# Patient Record
Sex: FEMALE | Race: ASIAN | NOT HISPANIC OR LATINO | Employment: UNEMPLOYED | URBAN - METROPOLITAN AREA
[De-identification: names, ages, dates, MRNs, and addresses within clinical notes are randomized per-mention and may not be internally consistent; named-entity substitution may affect disease eponyms.]

---

## 2017-01-03 RX ORDER — LEVOTHYROXINE SODIUM 100 UG/1
100 TABLET ORAL DAILY
Qty: 90 TABLET | Refills: 3 | Status: SHIPPED | OUTPATIENT
Start: 2017-01-03 | End: 2018-03-13 | Stop reason: SDUPTHER

## 2017-01-25 ENCOUNTER — TELEPHONE (OUTPATIENT)
Dept: INTERNAL MEDICINE | Facility: CLINIC | Age: 62
End: 2017-01-25

## 2017-01-25 NOTE — TELEPHONE ENCOUNTER
Spoke to pt and scheduled appt for 2/3/17. Offered pt an appt with another MD but she declined. Pt stated that she thinks the pain is fibroid pain. Offered to provide phone number to GYN but she declined because nothing was available with Dr. Dickson.

## 2017-01-25 NOTE — TELEPHONE ENCOUNTER
----- Message from Karensharif Cortez sent at 1/25/2017  3:15 PM CST -----  Contact: Self/[phone] cell  Type: Sooner appointment than  is able to schedule    When is the first available appointment? 02/03/2017    What is the nature of the appointment?Back pain    What appointment type:UC    Comments:Patient is calling to request access to an earlier appointment. She would like to be seen in the office on Friday, 01/27/2017. Please call and advise.    Thank you!

## 2017-02-03 ENCOUNTER — TELEPHONE (OUTPATIENT)
Dept: UROGYNECOLOGY | Facility: CLINIC | Age: 62
End: 2017-02-03

## 2017-02-03 ENCOUNTER — OFFICE VISIT (OUTPATIENT)
Dept: INTERNAL MEDICINE | Facility: CLINIC | Age: 62
End: 2017-02-03
Payer: COMMERCIAL

## 2017-02-03 VITALS
DIASTOLIC BLOOD PRESSURE: 75 MMHG | SYSTOLIC BLOOD PRESSURE: 118 MMHG | BODY MASS INDEX: 26.17 KG/M2 | WEIGHT: 147.69 LBS

## 2017-02-03 DIAGNOSIS — R30.0 DYSURIA: ICD-10-CM

## 2017-02-03 DIAGNOSIS — D25.1 INTRAMURAL LEIOMYOMA OF UTERUS: ICD-10-CM

## 2017-02-03 DIAGNOSIS — K80.20 GALLSTONES: ICD-10-CM

## 2017-02-03 DIAGNOSIS — M54.50 ACUTE MIDLINE LOW BACK PAIN WITHOUT SCIATICA: ICD-10-CM

## 2017-02-03 DIAGNOSIS — N81.84 PELVIC MUSCLE WASTING: ICD-10-CM

## 2017-02-03 DIAGNOSIS — Z86.010 ENCOUNTER FOR COLONOSCOPY DUE TO HISTORY OF ADENOMATOUS COLONIC POLYPS: Primary | ICD-10-CM

## 2017-02-03 DIAGNOSIS — E03.9 ACQUIRED HYPOTHYROIDISM: ICD-10-CM

## 2017-02-03 DIAGNOSIS — N81.10 BLADDER PROLAPSE, FEMALE, ACQUIRED: ICD-10-CM

## 2017-02-03 DIAGNOSIS — K76.0 FATTY LIVER: ICD-10-CM

## 2017-02-03 DIAGNOSIS — D12.6 COLON ADENOMA: ICD-10-CM

## 2017-02-03 DIAGNOSIS — R10.2 PELVIC PAIN IN FEMALE: Primary | ICD-10-CM

## 2017-02-03 DIAGNOSIS — Z12.11 ENCOUNTER FOR COLONOSCOPY DUE TO HISTORY OF ADENOMATOUS COLONIC POLYPS: Primary | ICD-10-CM

## 2017-02-03 LAB
BILIRUB UR QL STRIP: NEGATIVE
CLARITY UR REFRACT.AUTO: ABNORMAL
COLOR UR AUTO: YELLOW
GLUCOSE UR QL STRIP: NEGATIVE
HGB UR QL STRIP: NEGATIVE
KETONES UR QL STRIP: NEGATIVE
LEUKOCYTE ESTERASE UR QL STRIP: NEGATIVE
NITRITE UR QL STRIP: NEGATIVE
PH UR STRIP: 8 [PH] (ref 5–8)
PROT UR QL STRIP: NEGATIVE
SP GR UR STRIP: 1.01 (ref 1–1.03)
URN SPEC COLLECT METH UR: ABNORMAL
UROBILINOGEN UR STRIP-ACNC: NEGATIVE EU/DL

## 2017-02-03 PROCEDURE — 99999 PR PBB SHADOW E&M-EST. PATIENT-LVL III: CPT | Mod: PBBFAC,,, | Performed by: INTERNAL MEDICINE

## 2017-02-03 PROCEDURE — 87086 URINE CULTURE/COLONY COUNT: CPT

## 2017-02-03 PROCEDURE — 81003 URINALYSIS AUTO W/O SCOPE: CPT

## 2017-02-03 PROCEDURE — 99214 OFFICE O/P EST MOD 30 MIN: CPT | Mod: S$GLB,,, | Performed by: INTERNAL MEDICINE

## 2017-02-03 RX ORDER — SODIUM, POTASSIUM,MAG SULFATES 17.5-3.13G
SOLUTION, RECONSTITUTED, ORAL ORAL
Qty: 1 BOTTLE | Refills: 0 | Status: ON HOLD | OUTPATIENT
Start: 2017-02-03 | End: 2017-03-03 | Stop reason: ALTCHOICE

## 2017-02-03 NOTE — PROGRESS NOTES
Subjective:       Patient ID: Isha Leonard is a 61 y.o. female.    Chief Complaint: Back Pain and Abdominal Pain    HPI Comments: UC appt    Low back pain severe last week, better now. She tried no meds; did try heat.    She used a skirt to support her abdomen- she is concerned about fibroids.  Belly more protuberant she says, more painful; also with bladder prolapse.  Would like to return to GYN to consider hysterectomy and bladder suspension.    No upper abdominal sx to suggest gallbladder.  No weight loss or change in bowel habits; she is due for a colonoscopy as well.  Reviewed.    Some urinary frequency.    Patient Active Problem List:     Hyperlipidemia     Acquired hypothyroidism     Vocal cord cyst     AR (allergic rhinitis)     Glucose intolerance (impaired glucose tolerance)     Lipoma of other skin and subcutaneous tissue     Pelvic muscle wasting     Intramural leiomyoma of uterus     Gallstones: see ultrasound 2014     Thyroid nodule: s/p R thyroidectomy; L side wnl 2014     Carpal tunnel syndrome of left wrist     Colon adenoma: 2011- repeat colonoscopy 12/16     Fatty liver: see u/s 2014            Back Pain   Associated symptoms include abdominal pain. Pertinent negatives include no fever, numbness or weakness.   Abdominal Pain   Associated symptoms include arthralgias and frequency. Pertinent negatives include no fever or hematuria.     Review of Systems   Constitutional: Negative for chills, fatigue and fever.   HENT: Positive for congestion and postnasal drip. Negative for ear pain.    Eyes: Negative.    Respiratory: Negative for cough, chest tightness, shortness of breath and wheezing.    Cardiovascular: Negative.    Gastrointestinal: Positive for abdominal distention and abdominal pain.   Genitourinary: Positive for frequency. Negative for difficulty urinating and hematuria.   Musculoskeletal: Positive for arthralgias and back pain.   Skin: Negative for rash.   Neurological: Negative for  weakness and numbness.       Objective:      Physical Exam   Constitutional: She is oriented to person, place, and time. She appears well-developed and well-nourished.   HENT:   Head: Normocephalic and atraumatic.   Right Ear: External ear normal.   Left Ear: External ear normal.   Nose: Nose normal.   Mouth/Throat: Oropharynx is clear and moist. No oropharyngeal exudate.   Eyes: Conjunctivae and EOM are normal. No scleral icterus.   Neck: Normal range of motion. Neck supple. No JVD present. No thyromegaly present.   Cardiovascular: Normal rate, regular rhythm, normal heart sounds and intact distal pulses.  Exam reveals no gallop.    No murmur heard.  Pulmonary/Chest: Effort normal and breath sounds normal. No respiratory distress. She has no wheezes. She exhibits no tenderness.   Abdominal: Soft. Bowel sounds are normal. She exhibits no distension and no mass. There is no tenderness. There is no rebound and no guarding.   Fibroids- enlarged uterus   Musculoskeletal: Normal range of motion. She exhibits no edema or tenderness.   No CVA pain.  Negative SLR.  Strength UE and LE wnl.  No pain over spine   Lymphadenopathy:     She has no cervical adenopathy.   Neurological: She is alert and oriented to person, place, and time. She displays normal reflexes. No cranial nerve deficit. Coordination normal.   Skin: Skin is warm and dry. No rash noted. No erythema.   Psychiatric: She has a normal mood and affect. Her behavior is normal. Judgment and thought content normal.   Nursing note and vitals reviewed.      Assessment:       1. Pelvic pain in female    2. Colon adenoma: 2011- repeat colonoscopy 12/16    3. Pelvic muscle wasting    4. Bladder prolapse, female, acquired    5. Intramural leiomyoma of uterus    6. Dysuria    7. Acquired hypothyroidism    8. Fatty liver: see u/s 2014    9. Gallstones: see ultrasound 2014    10. Acute midline low back pain without sciatica        Plan:         Pelvic pain in female  -     CT  Abdomen Pelvis W Wo Contrast; Future; Expected date: 2/3/17    Colon adenoma: 2011- repeat colonoscopy 12/16  -     Case request GI: COLONOSCOPY    Pelvic muscle wasting  -     Ambulatory consult to Urogynecology    Bladder prolapse, female, acquired  -     Ambulatory consult to Urogynecology    Intramural leiomyoma of uterus  -     Ambulatory consult to Urogynecology    Dysuria  -     Urinalysis  -     Urine culture    Acquired hypothyroidism: continue meds    Fatty liver: see u/s 2014: diet reviewed; will continue to monitor    Gallstones: see ultrasound 2014: no sx    Natural history of back pain reviewed; NSAIDs, exercise, ice; may need PT.  No alarms sx or signs    I will review all studies and determine further tx depending on findings

## 2017-02-03 NOTE — TELEPHONE ENCOUNTER
----- Message from Jose R Downey sent at 2/3/2017 11:20 AM CST -----  Contact: Pt  X_ 1st Request  _ 2nd Request  _ 3rd Request    Who:MALLORY LING [[id number]]    Why: Patient states they would like to speak with staff in regards to scheduling an appointment     What Number to Call Back: [phone]    When to Expect a call back: (Before the end of the day)  -- if call after 3:00 call back will be tomorrow.

## 2017-02-03 NOTE — Clinical Note
Bernabe Bean- hope you're ok!  Am sending her to Dr Salazar for prolapse; she seems to think her fibroids are causing a lot of issues so may need to see you again as well-  Gwen

## 2017-02-03 NOTE — MR AVS SNAPSHOT
[facility]  [address]  [facility] [address]  Phone: [phone]  Fax: [phone]                  Isha Leonard   2/3/2017 10:30 AM   Office Visit    Description:  Female : 1955   Provider:  Gwne Duarte MD   Department:  [facility]           Reason for Visit     Back Pain     Abdominal Pain           Diagnoses this Visit        Comments    Colon adenoma    -  Primary     Pelvic pain in female         Pelvic muscle wasting         Bladder prolapse, female, acquired         Intramural leiomyoma of uterus         Dysuria                To Do List           Future Appointments        Provider Department Dept Phone    2017 7:30 AM [facility] CT1 LIMIT 400 LBS [facility] [phone]      To Schedule:     Please call the Endoscopy Department at [phone] to schedule your appointment.          Goals (5 Years of Data)     None      [facility]     [facility] On [facility] Nurse Care Line -  Assistance  Registered nurses in the [facility] Center provide clinical advisement, health education, appointment booking, and other advisory services.  Call for this free service at [phone].             Medications           Message regarding Medications     Verify the changes and/or additions to your medication regime listed below are the same as discussed with your clinician today.  If any of these changes or additions are incorrect, please notify your healthcare provider.             Verify that the below list of medications is an accurate representation of the medications you are currently taking.  If none reported, the list may be blank. If incorrect, please contact your healthcare provider. Carry this list with you in case of emergency.           Current Medications     levothyroxine (SYNTHROID) 100 MCG tablet Take 1 tablet (100 mcg total) by mouth once daily.    loratadine (CLARITIN) 10 mg tablet Take 10 mg by mouth once daily.     sodium chloride (SALINE NASAL) 0.65 % nasal spray 1 spray by Nasal route as needed for Congestion.           Clinical Reference Information           Your Vitals Were     BP Weight BMI          118/75 (BP Location: Left arm, Patient Position: Sitting, BP Method: Manual) 67 kg (147 lb 11.3 oz) 26.17 kg/m2        Blood Pressure          Most Recent Value    BP  118/75      Allergies as of 2/3/2017     No Known Allergies      Immunizations Administered on Date of Encounter - 2/3/2017     None      Orders Placed During Today's Visit      Normal Orders This Visit    Ambulatory consult to Urogynecology     Case request GI: COLONOSCOPY     Urinalysis     Urine culture     Future Labs/Procedures Expected by Expires    CT Abdomen Pelvis W Wo Contrast  2/3/2017 5/4/2017      Instructions      Back Pain (Acute or Chronic)    Back pain is one of the most common problems. The good news is that most people feel better in 1 to 2 weeks, and most of the rest in 1 to 2 months. Most people can remain active.  People experience and describe pain differently; not everyone is the same.  · The pain can be sharp, stabbing, shooting, aching, cramping or burning.  · Movement, standing, bending, lifting, sitting, or walking may worsen pain.  · It can be localized to one spot or area, or it can be more generalized.  · It can spread or radiate upwards, to the front, or go down your arms or legs (sciatica).  · It can cause muscle spasm.  Most of the time, mechanical problems with the muscles or spine cause the pain. Mechanical problems are usually caused by an injury to the muscles or ligaments. While illness can cause back pain, it is usually not caused by a serious illness. Mechanical problems include:   · Physical activity such as sports, exercise, work, or normal activity  · Overexertion, lifting, pushing, pulling incorrectly or too aggressively  · Sudden twisting, bending, or stretching from an accident, or accidental movement  · Poor  posture  · Stretching or moving wrong, without noticing pain at the time  · Poor coordination, lack of regular exercise (check with your doctor about this)  · Spinal disc disease or arthritis  · Stress  Pain can also be related to pregnancy, or illness like appendicitis, bladder or kidney infections, pelvic infections, and many other things.  Acute back pain usually gets better in 1 to 2 weeks. Back pain related to disk disease, arthritis in the spinal joints or spinal stenosis (narrowing of the spinal canal) can become chronic and last for months or years.  Unless you had a physical injury (for example, a car accident or fall) X-rays are usually not needed for the initial evaluation of back pain. If pain continues and does not respond to medical treatment, X-rays and other tests may be needed.  Home care  Try these home care recommendations:  · When in bed, try to find a position of comfort. A firm mattress is best. Try lying flat on your back with pillows under your knees. You can also try lying on your side with your knees bent up towards your chest and a pillow between your knees.  · At first, do not try to stretch out the sore spots. If there is a strain, it is not like the good soreness you get after exercising without an injury. In this case, stretching may make it worse.  · Avoid prolong sitting, long car rides, or travel. This puts more stress on the lower back than standing or walking.  · During the first 24 to 72 hours after an acute injury or flare up of chronic back pain, apply an ice pack to the painful area for 20 minutes and then remove it for 20 minutes. Do this over a period of 60 to 90 minutes or several times a day. This will reduce swelling and pain. Wrap the ice pack in a thin towel or plastic to protect your skin.  · You can start with ice, then switch to heat. Heat (hot shower, hot bath, or heating pad) reduces pain and works well for muscle spasms. Heat can be applied to the painful area for  20 minutes then remove it for 20 minutes. Do this over a period of 60 to 90 minutes or several times a day. Do not sleep on a heating pad. It can lead to skin burns or tissue damage.  · You can alternate ice and heat therapy. Talk with your doctor about the best treatment for your back pain.  · Therapeutic massage can help relax the back muscles without stretching them.  · Be aware of safe lifting methods and do not lift anything without stretching first.  Medicines  Talk to your doctor before using medicine, especially if you have other medical problems or are taking other medicines.  · You may use over-the-counter medicine as directed on the bottle to control pain, unless another pain medicine was prescribed. If you have chronic conditions like diabetes, liver or kidney disease, stomach ulcers, or gastrointestinal bleeding, or are taking blood thinners, talk to your doctor before taking any medicine.  · Be careful if you are given a prescription medicines, narcotics, or medicine for muscle spasms. They can cause drowsiness, affect your coordination, reflexes, and judgement. Do not drive or operate heavy machinery.  Follow-up care  Follow up with your healthcare provider, or as advised.   A radiologist will review any X-rays that were taken. Your provide will notify you of any new findings that may affect your care.  Call 911  Call emergency services if any of the following occur:  · Trouble breathing  · Confusion  · Very drowsy or trouble awakening  · Fainting or loss of consciousness  · Rapid or very slow heart rate  · Loss of bowel or bladder control  When to seek medical advice  Call your healthcare provider right away if any of these occur:   · Pain becomes worse or spreads to your legs  · Weakness or numbness in one or both legs  · Numbness in the groin or genital area  Date Last Reviewed: 7/1/2016  © 1011-4112 [employer]. [address] [place] [address], [place] [address]. All rights reserved. This  information is not intended as a substitute for professional medical care. Always follow your healthcare professional's instructions.        Self-Care for Low Back Pain    Most people have low back pain now and then. In many cases, it isnt serious and self-care can help. Sometimes low back pain can be a sign of a bigger problem. Call your healthcare provider if your pain returns often or gets worse over time. For the long-term care of your back, get regular exercise, lose any excess weight and learn good posture.  Take a short rest  Lying down during the day may be beneficial for short periods of time if severe pain increases with sitting or standing. Long-term bed rest could be detrimental.  Reduce pain and swelling  Cold reduces swelling. Both cold and heat can reduce pain. Protect your skin by placing a towel between your body and the ice or heat source.  · For the first few days, apply an ice pack for 15 to 20 minutes .  · After the first few days, try heat for 15 minutes at a time to ease pain. Never sleep on a heating pad.  · Over-the-counter medicine can help control pain and swelling. Try aspirin or ibuprofen.  Exercise  Exercise can help your back heal. It also helps your back get stronger and more flexible, preventing any reinjury. Ask your healthcare provider about specific exercises for your back.  Use good posture to avoid reinjury  · When moving, bend at the hips and knees. Dont bend at the waist or twist around.  · When lifting, keep the object close to your body. Dont try to lift more than you can handle.  · When sitting, keep your lower back supported. Use a rolled-up towel as needed.  Seek immediate medical care if:  · Youre unable to stand or walk.  · You have a temperature over 100.4°F (38.0°C)  · You have frequent, painful, or bloody urination.  · You have severe abdominal pain.  · You have a sharp, stabbing pain.  · Your pain is constant.  · You have pain or numbness in your leg.  · You  feel pain in a new area of your back.  · You notice that the pain isnt decreasing after more than a week.   Date Last Reviewed: 9/29/2015 © 2000-2016 [employer], [address] [place] [address], [place] [address]. All rights reserved. This information is not intended as a substitute for professional medical care. Always follow your healthcare professional's instructions.             Language Assistance Services     ATTENTION: Language assistance services are available, free of charge. Please call [phone].      ATENCIÓN: Si kandila sohail, tiene a durham disposición servicios gratuitos de asistencia lingüística. Llame al [phone].     ELMIRA Ý: N?u b?n nói Ti?ng Vi?t, có các d?ch v? h? tr? ngôn ng? mi?n phí dành cho b?n. G?i s? [phone].         [facility] complies with applicable Federal civil rights laws and does not discriminate on the basis of race, color, national origin, age, disability, or sex.

## 2017-02-03 NOTE — PATIENT INSTRUCTIONS
Back Pain (Acute or Chronic)    Back pain is one of the most common problems. The good news is that most people feel better in 1 to 2 weeks, and most of the rest in 1 to 2 months. Most people can remain active.  People experience and describe pain differently; not everyone is the same.  · The pain can be sharp, stabbing, shooting, aching, cramping or burning.  · Movement, standing, bending, lifting, sitting, or walking may worsen pain.  · It can be localized to one spot or area, or it can be more generalized.  · It can spread or radiate upwards, to the front, or go down your arms or legs (sciatica).  · It can cause muscle spasm.  Most of the time, mechanical problems with the muscles or spine cause the pain. Mechanical problems are usually caused by an injury to the muscles or ligaments. While illness can cause back pain, it is usually not caused by a serious illness. Mechanical problems include:   · Physical activity such as sports, exercise, work, or normal activity  · Overexertion, lifting, pushing, pulling incorrectly or too aggressively  · Sudden twisting, bending, or stretching from an accident, or accidental movement  · Poor posture  · Stretching or moving wrong, without noticing pain at the time  · Poor coordination, lack of regular exercise (check with your doctor about this)  · Spinal disc disease or arthritis  · Stress  Pain can also be related to pregnancy, or illness like appendicitis, bladder or kidney infections, pelvic infections, and many other things.  Acute back pain usually gets better in 1 to 2 weeks. Back pain related to disk disease, arthritis in the spinal joints or spinal stenosis (narrowing of the spinal canal) can become chronic and last for months or years.  Unless you had a physical injury (for example, a car accident or fall) X-rays are usually not needed for the initial evaluation of back pain. If pain continues and does not respond to medical treatment, X-rays and other tests may be  needed.  Home care  Try these home care recommendations:  · When in bed, try to find a position of comfort. A firm mattress is best. Try lying flat on your back with pillows under your knees. You can also try lying on your side with your knees bent up towards your chest and a pillow between your knees.  · At first, do not try to stretch out the sore spots. If there is a strain, it is not like the good soreness you get after exercising without an injury. In this case, stretching may make it worse.  · Avoid prolong sitting, long car rides, or travel. This puts more stress on the lower back than standing or walking.  · During the first 24 to 72 hours after an acute injury or flare up of chronic back pain, apply an ice pack to the painful area for 20 minutes and then remove it for 20 minutes. Do this over a period of 60 to 90 minutes or several times a day. This will reduce swelling and pain. Wrap the ice pack in a thin towel or plastic to protect your skin.  · You can start with ice, then switch to heat. Heat (hot shower, hot bath, or heating pad) reduces pain and works well for muscle spasms. Heat can be applied to the painful area for 20 minutes then remove it for 20 minutes. Do this over a period of 60 to 90 minutes or several times a day. Do not sleep on a heating pad. It can lead to skin burns or tissue damage.  · You can alternate ice and heat therapy. Talk with your doctor about the best treatment for your back pain.  · Therapeutic massage can help relax the back muscles without stretching them.  · Be aware of safe lifting methods and do not lift anything without stretching first.  Medicines  Talk to your doctor before using medicine, especially if you have other medical problems or are taking other medicines.  · You may use over-the-counter medicine as directed on the bottle to control pain, unless another pain medicine was prescribed. If you have chronic conditions like diabetes, liver or kidney disease,  stomach ulcers, or gastrointestinal bleeding, or are taking blood thinners, talk to your doctor before taking any medicine.  · Be careful if you are given a prescription medicines, narcotics, or medicine for muscle spasms. They can cause drowsiness, affect your coordination, reflexes, and judgement. Do not drive or operate heavy machinery.  Follow-up care  Follow up with your healthcare provider, or as advised.   A radiologist will review any X-rays that were taken. Your provide will notify you of any new findings that may affect your care.  Call 911  Call emergency services if any of the following occur:  · Trouble breathing  · Confusion  · Very drowsy or trouble awakening  · Fainting or loss of consciousness  · Rapid or very slow heart rate  · Loss of bowel or bladder control  When to seek medical advice  Call your healthcare provider right away if any of these occur:   · Pain becomes worse or spreads to your legs  · Weakness or numbness in one or both legs  · Numbness in the groin or genital area  Date Last Reviewed: 7/1/2016 © 2000-2016 [employer]. [address] [place] [address]. All rights reserved. This information is not intended as a substitute for professional medical care. Always follow your healthcare professional's instructions.        Self-Care for Low Back Pain    Most people have low back pain now and then. In many cases, it isnt serious and self-care can help. Sometimes low back pain can be a sign of a bigger problem. Call your healthcare provider if your pain returns often or gets worse over time. For the long-term care of your back, get regular exercise, lose any excess weight and learn good posture.  Take a short rest  Lying down during the day may be beneficial for short periods of time if severe pain increases with sitting or standing. Long-term bed rest could be detrimental.  Reduce pain and swelling  Cold reduces swelling. Both cold and heat can reduce pain. Protect  your skin by placing a towel between your body and the ice or heat source.  · For the first few days, apply an ice pack for 15 to 20 minutes .  · After the first few days, try heat for 15 minutes at a time to ease pain. Never sleep on a heating pad.  · Over-the-counter medicine can help control pain and swelling. Try aspirin or ibuprofen.  Exercise  Exercise can help your back heal. It also helps your back get stronger and more flexible, preventing any reinjury. Ask your healthcare provider about specific exercises for your back.  Use good posture to avoid reinjury  · When moving, bend at the hips and knees. Dont bend at the waist or twist around.  · When lifting, keep the object close to your body. Dont try to lift more than you can handle.  · When sitting, keep your lower back supported. Use a rolled-up towel as needed.  Seek immediate medical care if:  · Youre unable to stand or walk.  · You have a temperature over 100.4°F (38.0°C)  · You have frequent, painful, or bloody urination.  · You have severe abdominal pain.  · You have a sharp, stabbing pain.  · Your pain is constant.  · You have pain or numbness in your leg.  · You feel pain in a new area of your back.  · You notice that the pain isnt decreasing after more than a week.   Date Last Reviewed: 9/29/2015  © 7120-1504 [employer]. [address] [place] [address], [place] [address]. All rights reserved. This information is not intended as a substitute for professional medical care. Always follow your healthcare professional's instructions.

## 2017-02-03 NOTE — TELEPHONE ENCOUNTER
Spoke to pt and scheduled her for Dr. Salazar's next available and informed her I'd send an appointment letter in the mail. Pt voiced understanding and call ended.

## 2017-02-04 LAB — BACTERIA UR CULT: NO GROWTH

## 2017-02-06 ENCOUNTER — PATIENT MESSAGE (OUTPATIENT)
Dept: INTERNAL MEDICINE | Facility: CLINIC | Age: 62
End: 2017-02-06

## 2017-02-06 ENCOUNTER — HOSPITAL ENCOUNTER (OUTPATIENT)
Dept: RADIOLOGY | Facility: HOSPITAL | Age: 62
Discharge: HOME | End: 2017-02-06
Attending: INTERNAL MEDICINE
Payer: COMMERCIAL

## 2017-02-06 DIAGNOSIS — R10.2 PELVIC PAIN IN FEMALE: ICD-10-CM

## 2017-02-08 ENCOUNTER — INITIAL CONSULT (OUTPATIENT)
Dept: UROGYNECOLOGY | Facility: CLINIC | Age: 62
End: 2017-02-08
Payer: COMMERCIAL

## 2017-02-08 VITALS
DIASTOLIC BLOOD PRESSURE: 66 MMHG | HEIGHT: 63 IN | WEIGHT: 147.69 LBS | SYSTOLIC BLOOD PRESSURE: 120 MMHG | BODY MASS INDEX: 26.17 KG/M2

## 2017-02-08 DIAGNOSIS — N39.3 URINARY, INCONTINENCE, STRESS FEMALE: ICD-10-CM

## 2017-02-08 DIAGNOSIS — M62.08 RECTUS DIASTASIS: ICD-10-CM

## 2017-02-08 DIAGNOSIS — N81.11 MIDLINE CYSTOCELE: ICD-10-CM

## 2017-02-08 DIAGNOSIS — R10.2 PELVIC PRESSURE IN FEMALE: Primary | ICD-10-CM

## 2017-02-08 DIAGNOSIS — N81.4 UTEROVAGINAL PROLAPSE: ICD-10-CM

## 2017-02-08 DIAGNOSIS — N81.6 RECTOCELE, FEMALE: ICD-10-CM

## 2017-02-08 PROCEDURE — 87086 URINE CULTURE/COLONY COUNT: CPT

## 2017-02-08 PROCEDURE — 51701 INSERT BLADDER CATHETER: CPT | Mod: S$GLB,,, | Performed by: OBSTETRICS & GYNECOLOGY

## 2017-02-08 PROCEDURE — 99245 OFF/OP CONSLTJ NEW/EST HI 55: CPT | Mod: 25,S$GLB,, | Performed by: OBSTETRICS & GYNECOLOGY

## 2017-02-08 PROCEDURE — 99999 PR PBB SHADOW E&M-EST. PATIENT-LVL III: CPT | Mod: PBBFAC,,, | Performed by: OBSTETRICS & GYNECOLOGY

## 2017-02-08 NOTE — LETTER
February 8, 2017      Gwen Duarte MD  [address] [facility] [id number]           [facility] [address] [place] [address]  Phone: [phone]          Patient: Isha Leonard   MR Number: [id number]   YOB: 1955   Date of Visit: 2/8/2017       Dear Dr. Gwen Duarte:    Thank you for referring Isha Leonard to me for evaluation. Attached you will find relevant portions of my assessment and plan of care.    If you have questions, please do not hesitate to call me. I look forward to following Isha Leonard along with you.    Sincerely,    Ese Salazar MD    Enclosure  CC:  No Recipients    If you would like to receive this communication electronically, please contact [email or web address] or [phone] to request more information on [employer] access.    For providers and/or their staff who would like to refer a patient to [facility], please contact us through our one-stop-shop provider referral line, [facility], at [phone].    If you feel you have received this communication in error or would no longer like to receive these types of communications, please e-mail [email or web address]

## 2017-02-08 NOTE — PATIENT INSTRUCTIONS
1)  Stage 2 cystocele, stage 1 uterovaginal prolapse, stage 2 rectocele in setting of rectus diastasis:  --very mild, not past introitus; main complaint is vaginal pressure and abdominal laxity/pressure  --start core exercises (see sheets); consider joining gym   --start PT:  Want pelvic floor PT + core strengthening:  [place] ([facility]): (p) [phone]. (f) [phone]. Established patients call:  [phone]. Call in a few days to make appointment.    --could consider surgery to reapproximate diastasis in future    2)  Female stress incontinence:  --urine C&S  --Empty bladder every 3 hours.  Empty well: wait a minute, lean forward on toilet.    --Avoid dietary irritants (see sheet).  Keep diary x 3-5 days to determine your irritants.  --KEGELS: do 10 in AM and 10 in PM, holding each x 10 seconds.  When you feel urge to go, STOP, KEGEL, and when urge has passed, then go to bathroom.  Consider PT in future.    --STRESS:  Pessary vs. Sling.     3)  Well-woman:  Last pap: 8/2016- negative  Last mammogram: 7/2016- negative  Colonoscopy: Planned for march  DEXA: 2015 Osteopenia - Non-compliant with calcium/vit D.  Repeat 2017-19.      Healthy bones:   --Take 600 mg calcium every AM and 600 mg calcium every PM (for total of 1200 mg daily).  Take 400 IU vitamin D in AM and 400 IU vitamin D in the PM (for total of 800 IU daily).    --Start weight bearing exercises in improve bone density:  This type of exercise forces you to work against gravity. Some examples of weight-bearing exercises include weight training, walking, hiking, jogging, climbing stairs, tennis, and dancing. Examples of exercises that are not weight-bearing include swimming and bicycling. Although these activities help build and maintain strong muscles and have excellent cardiovascular benefits, they are not the best way to exercise your bones.  --Follow up for repeat DEXA (bone density testing) as scheduled.     4)   RTC 2-3 months.

## 2017-02-08 NOTE — PROGRESS NOTES
[facility]  [address] [facility] [address]    Isha Leonard  [id number]  1955    Consulting Physician: Gwen Duarte MD   GYN: Geneva Dickson MD  Primary M.D.: Gwen Duarte MD    Chief Complaint   Patient presents with    Vaginal Prolapse     pt states she's been feeling a buldge for 2 years    Fibroids    Urinary Incontinence     stress       HPI:     1)  UI:  (+) HUMZA.  (--) UUI  X 10years.  (+) pads:2/day, usually minimum wetness.  Daytime frequency: Q 2 hours.  Nocturia: No: 2/night.   (+) dysuria,  (--) hematuria,  (--) frequent UTIs.  (--) complete bladder emptying. Has to lean forward.    2)  POP:  Present. above introitus.  Symptoms:(+)  pressure.  (--) vaginal bleeding. (--) vaginal discharge. (+) sexually active.  (--) dyspareunia.  (--)  Vaginal dryness.  (--) vaginal estrogen use.     3)  BM:  (--) constipation/straining.  (--) chronic diarrhea. (--) hematochezia.  (--) fecal incontinence.  (--) fecal smearing/urgency.  (--) incomplete evacuation.     Past Medical History  Past Medical History   Diagnosis Date    Anemia     AR (allergic rhinitis) 2013    Colon adenoma: - repeat colonoscopy 2016    Fatty liver     GERD (gastroesophageal reflux disease)     Glucose intolerance (impaired glucose tolerance)     Hyperlipidemia     Hypertension     Pneumonia     Thyroid disease       Past Surgical History  Past Surgical History   Procedure Laterality Date    Thyroidectomy, partial       secondary to thyroid nodule    Breast biopsy       Left, benign      Hysterectomy: No  Cervix present/absent: Yes (2016- negative)  Ovaries present/absent: Yes    Past Ob History     x 4.      Largest infant weight: 8lb 2oz  yes FAVD. unknown episiotomy.      Gynecologic History  LMP: No LMP recorded. Patient is postmenopausal.  Age of menarche: 14  Age of menopause: 54  Menstrual history: Regular  Last pap: 2016-  negative  Last mammogram: 2016- negative  Colonoscopy: Planned for march  DEXA: Osteopenia - Non-compliant with calcium/vit D    Family History  Family History   Problem Relation Age of Onset    Osteoporosis Mother     Diabetes Mother     Hypertension Mother     Heart disease Mother     Cancer Father      Bladder cancer    Diabetes Sister     Breast cancer Neg Hx     Colon cancer Neg Hx     Eclampsia Neg Hx     Miscarriages / Stillbirths Neg Hx     Ovarian cancer Neg Hx      labor Neg Hx     Stroke Neg Hx     Cervical cancer Neg Hx     Endometrial cancer Neg Hx     Vaginal cancer Neg Hx       Colon CA: No  Breast CA: No  GYN CA: No   CA: Father- bladder cancer    Social History  History   Smoking Status    Never Smoker   Smokeless Tobacco    Never Used   .  History   Alcohol Use No   .    History   Drug Use No   .  The patient is .  Resides in [place] [address].  Employment status: retired  Teacher    Allergies  Review of patient's allergies indicates:  No Known Allergies    Medications  Current Outpatient Prescriptions on File Prior to Visit   Medication Sig Dispense Refill    levothyroxine (SYNTHROID) 100 MCG tablet Take 1 tablet (100 mcg total) by mouth once daily. 90 tablet 3    loratadine (CLARITIN) 10 mg tablet Take 10 mg by mouth once daily.      sodium chloride (SALINE NASAL) 0.65 % nasal spray 1 spray by Nasal route as needed for Congestion.      sodium,potassium,mag sulfates (SUPREP BOWEL PREP KIT) 17.5-3.13-1.6 gram SolR As directed-dispense 1 kit 1 Bottle 0     No current facility-administered medications on file prior to visit.        Review of Systems A 14 point ROS was reviewed with pertinent positives as noted above in the history of present illness.      Constitutional: negative  Eyes: negative  Endocrine: negative  Gastrointestinal: negative  Cardiovascular: negative  Respiratory: negative  Allergic/Immunologic: negative  Integumentary:  "negative  Psychiatric: negative  Musculoskeletal: negative   Ear/Nose/Throat: negative  Neurologic: negative  Genitourinary: SEE HPI  Hematologic/Lymphatic: negative   Breast: negative    Urogynecologic Exam  Visit Vitals    /66    Ht 5' 3" (1.6 m)    Wt 67 kg (147 lb 11.3 oz)    BMI 26.17 kg/m2       GENERAL APPEARANCE:  The patient is a well-developed, well-nourished female.  Neck:  Supple with no thyromegaly, no carotid bruits.  Heart:  Regular rate and rhythm, no murmurs, rubs or gallops.  Lungs:  Clear.  No CVA tenderness.  Abdomen:  Soft, nontender, nondistended, no hepatosplenomegaly.  Incisions:  None.  +rectus diastasis.      PELVIC:    External genitalia:  Normal Bartholins, Skenes and labia bilaterally.    Urethra:  No caruncle, diverticulum or masses.  (--) hypermobility.    Vagina:  Atrophy (--) , no bladder masses or tender, no discharge.    Cervix:  normal appearance  Uterus: enlarged, 12 weeks size. Mobile, NT.    Adnexa: Not palpable.    POP-Q:  Aa 0; Ba 0; C -5; Ap -1; Bp -1; D -7.  Genital hiatus 3, perineal body 2 total vaginal length 11-12 (standing).      NEUROLOGIC:  Cranial nerves 2 through 12 intact.  Strength 5/5.  DTRs 2+ lower extremities.  S2 through 4 normal.  Sacral reflexes intact.    EXT: CABAN, 2+ pulses bilaterally, no C/C/E    COUGH STRESS TEST:  negative  KEGEL: 1 /5    RECTAL:    External:  Normal, (--) hemorrhoids, (--) dovetailing.   Internal:   (--) tenderness, (--) masses, Normal resting tone, Abnormal - decreased active tone.  Heme occult: negative    PVR: 30 mL    Impression    1. Pelvic pressure in female    2. Urinary, incontinence, stress female    3. Rectus diastasis    4. Midline cystocele    5. Rectocele, female    6. Uterovaginal prolapse        Initial Plan  The patient was counseled regarding these issues. She was given a summary sheet containing each of these issues with possible options for evaluation and management. We also reviewed computer-generated " diagrams specific to her pelvic organ prolapse quantification findings and she was given a copy of this for her records.  All her questions were addressed to her satisfaction.    1)  Stage 2 cystocele, stage 1 uterovaginal prolapse, stage 2 rectocele in setting of rectus diastasis:  --very mild, not past introitus; main complaint is vaginal pressure and abdominal laxity/pressure  --start core exercises (see sheets); consider joining gym   --start PT:  Want pelvic floor PT + core strengthening:  [place] ([facility]): (p) [phone]. (f) [phone]. Established patients call:  [phone]. Call in a few days to make appointment.    --could consider surgery to reapproximate diastasis in future    2)  Female stress incontinence:  --urine C&S  --Empty bladder every 3 hours.  Empty well: wait a minute, lean forward on toilet.    --Avoid dietary irritants (see sheet).  Keep diary x 3-5 days to determine your irritants.  --KEGELS: do 10 in AM and 10 in PM, holding each x 10 seconds.  When you feel urge to go, STOP, KEGEL, and when urge has passed, then go to bathroom.  Consider PT in future.    --STRESS:  Pessary vs. Sling.     3)  Well-woman:  Last pap: 8/2016- negative  Last mammogram: 7/2016- negative  Colonoscopy: Planned for march  DEXA: 2015 Osteopenia - Non-compliant with calcium/vit D.  Repeat 2017-19.      Healthy bones:   --Take 600 mg calcium every AM and 600 mg calcium every PM (for total of 1200 mg daily).  Take 400 IU vitamin D in AM and 400 IU vitamin D in the PM (for total of 800 IU daily).    --Start weight bearing exercises in improve bone density:  This type of exercise forces you to work against gravity. Some examples of weight-bearing exercises include weight training, walking, hiking, jogging, climbing stairs, tennis, and dancing. Examples of exercises that are not weight-bearing include swimming and bicycling. Although these activities help build and maintain strong  muscles and have excellent cardiovascular benefits, they are not the best way to exercise your bones.  --Follow up for repeat DEXA (bone density testing) as scheduled.     4)  RTC 2-3 months.      Approximately 60 min were spent in consult, 90 % in discussion.     Thank you for requesting consultation of your patient.  I look forward to participating in her care.    Patient seen with resident physician Rodolfo Gambino MD ([id number]).    Ese Salazar  Female Pelvic Medicine and Reconstructive Surgery  [facility] [place], [place]

## 2017-02-08 NOTE — MR AVS SNAPSHOT
[facility]  [address] [address]  Phone: [phone]                  Isha Leonard   2017 9:30 AM   Initial consult    Description:  Female : 1955   Provider:  Ese Salazar MD   Department:  [facility]           Reason for Visit     Vaginal Prolapse     Fibroids     Urinary Incontinence           Diagnoses this Visit        Comments    Pelvic pressure in female    -  Primary     Urinary, incontinence, stress female         Rectus diastasis         Midline cystocele                To Do List           Your Future Surgeries/Procedures     Mar 03, 2017   Surgery with Mauri Richardson MD   [facility] ([facility])    [address] [address] [phone]              Goals (5 Years of Data)     None      [facility]     [facility] Nurse Care [facility] -  Assistance  Registered nurses in the [facility] provide clinical advisement, health education, appointment booking, and other advisory services.  Call for this free service at [phone].             Medications           Message regarding Medications     Verify the changes and/or additions to your medication regime listed below are the same as discussed with your clinician today.  If any of these changes or additions are incorrect, please notify your healthcare provider.             Verify that the below list of medications is an accurate representation of the medications you are currently taking.  If none reported, the list may be blank. If incorrect, please contact your healthcare provider. Carry this list with you in case of emergency.           Current Medications     levothyroxine (SYNTHROID) 100 MCG tablet Take 1 tablet (100 mcg total) by mouth once daily.    loratadine (CLARITIN) 10 mg tablet Take 10 mg by mouth once daily.    sodium chloride (SALINE NASAL) 0.65 % nasal spray 1 spray by Nasal route as  "needed for Congestion.    sodium,potassium,mag sulfates (SUPREP BOWEL PREP KIT) 17.5-3.13-1.6 gram SolR As directed-dispense 1 kit           Clinical Reference Information           Your Vitals Were     BP Height Weight BMI       120/66 5' 3" (1.6 m) 67 kg (147 lb 11.3 oz) 26.17 kg/m2       Blood Pressure          Most Recent Value    BP  120/66      Allergies as of 2/8/2017     No Known Allergies      Immunizations Administered on Date of Encounter - 2/8/2017     None      Orders Placed During Today's Visit      Normal Orders This Visit    Ambulatory consult to Physical Therapy     Urine culture       Instructions    1)  Stage 2 cystocele, stage 1 uterovaginal prolapse, stage 2 rectocele in setting of rectus diastasis:  --very mild, not past introitus; main complaint is vaginal pressure and abdominal laxity/pressure  --start core exercises (see sheets); consider joining gym   --start PT:  Want pelvic floor PT + core strengthening:  [place] [facility] ([facility]): (p) [phone]. (f) [phone]. Established patients call:  [phone]. Call in a few days to make appointment.    --could consider surgery to reapproximate diastasis in future    2)  Female stress incontinence:  --urine C&S  --Empty bladder every 3 hours.  Empty well: wait a minute, lean forward on toilet.    --Avoid dietary irritants (see sheet).  Keep diary x 3-5 days to determine your irritants.  --KEGELS: do 10 in AM and 10 in PM, holding each x 10 seconds.  When you feel urge to go, STOP, KEGEL, and when urge has passed, then go to bathroom.  Consider PT in future.    --STRESS:  Pessary vs. Sling.     3)  Well-woman:  Last pap: 8/2016- negative  Last mammogram: 7/2016- negative  Colonoscopy: Planned for march  DEXA: 2015 Osteopenia - Non-compliant with calcium/vit D.  Repeat 2017-19.      Healthy bones:   --Take 600 mg calcium every AM and 600 mg calcium every PM (for total of 1200 mg daily).  Take 400 IU vitamin D in AM " and 400 IU vitamin D in the PM (for total of 800 IU daily).    --Start weight bearing exercises in improve bone density:  This type of exercise forces you to work against gravity. Some examples of weight-bearing exercises include weight training, walking, hiking, jogging, climbing stairs, tennis, and dancing. Examples of exercises that are not weight-bearing include swimming and bicycling. Although these activities help build and maintain strong muscles and have excellent cardiovascular benefits, they are not the best way to exercise your bones.  --Follow up for repeat DEXA (bone density testing) as scheduled.     4)  RTC 2-3 months.         Language Assistance Services     ATTENTION: Language assistance services are available, free of charge. Please call [phone].      ATENCIÓN: Si arline sauceda, tiene a durham disposición servicios gratuitos de asistencia lingüística. Llame al [phone].     ELMIRA Ý: N?u b?n nói Ti?ng Vi?t, có các d?ch v? h? tr? ngôn ng? mi?n phí dành cho b?n. G?i s? [phone].         [facility] complies with applicable Federal civil rights laws and does not discriminate on the basis of race, color, national origin, age, disability, or sex.

## 2017-02-09 LAB — BACTERIA UR CULT: NO GROWTH

## 2017-02-20 ENCOUNTER — OFFICE VISIT (OUTPATIENT)
Dept: INTERNAL MEDICINE | Facility: CLINIC | Age: 62
End: 2017-02-20
Payer: COMMERCIAL

## 2017-02-20 VITALS
TEMPERATURE: 98 F | HEART RATE: 76 BPM | HEIGHT: 63 IN | SYSTOLIC BLOOD PRESSURE: 124 MMHG | DIASTOLIC BLOOD PRESSURE: 80 MMHG | OXYGEN SATURATION: 97 % | BODY MASS INDEX: 26.37 KG/M2 | WEIGHT: 148.81 LBS

## 2017-02-20 DIAGNOSIS — J06.9 VIRAL URI WITH COUGH: Primary | ICD-10-CM

## 2017-02-20 PROBLEM — R10.2 PELVIC PRESSURE IN FEMALE: Status: RESOLVED | Noted: 2017-02-08 | Resolved: 2017-02-20

## 2017-02-20 PROCEDURE — 99999 PR PBB SHADOW E&M-EST. PATIENT-LVL III: CPT | Mod: PBBFAC,,, | Performed by: INTERNAL MEDICINE

## 2017-02-20 PROCEDURE — 99213 OFFICE O/P EST LOW 20 MIN: CPT | Mod: S$GLB,,, | Performed by: INTERNAL MEDICINE

## 2017-02-20 RX ORDER — PROMETHAZINE HYDROCHLORIDE AND DEXTROMETHORPHAN HYDROBROMIDE 6.25; 15 MG/5ML; MG/5ML
5 SYRUP ORAL
Qty: 240 ML | Refills: 0 | Status: SHIPPED | OUTPATIENT
Start: 2017-02-20 | End: 2017-03-02

## 2017-02-20 NOTE — PROGRESS NOTES
"HPI Comments: " was sick and got antibiotics with the same symptoms".    URI    This is a new problem. The current episode started in the past 7 days. The problem has been unchanged. There has been no fever. Associated symptoms include congestion, coughing, rhinorrhea, sneezing and a sore throat. Pertinent negatives include no abdominal pain, chest pain, diarrhea, ear pain, headaches, nausea, plugged ear sensation, sinus pain, swollen glands, vomiting or wheezing. Treatments tried: "otc cold meds" The treatment provided no relief.        PMFSH: all information reviewed and updated in this encounter.  Medications: reviewed and reconciled today.    Physical Exam   Constitutional: She appears well-developed and well-nourished. No distress.   HENT:   Head: Normocephalic and atraumatic.   Right Ear: External ear normal.   Left Ear: External ear normal.   Nose: Rhinorrhea present. Right sinus exhibits no maxillary sinus tenderness and no frontal sinus tenderness. Left sinus exhibits no maxillary sinus tenderness and no frontal sinus tenderness.   Mouth/Throat: Uvula is midline and mucous membranes are normal. No posterior oropharyngeal edema or posterior oropharyngeal erythema. No tonsillar exudate.   Eyes: Conjunctivae are normal.   Neck: No thyromegaly present.   Cardiovascular: Normal rate, regular rhythm and intact distal pulses.    Pulmonary/Chest: Effort normal and breath sounds normal. She has no wheezes.   Lymphadenopathy:     She has no cervical adenopathy.        Assessment/plan:  1. Viral URI with cough  - promethazine-dextromethorphan (PROMETHAZINE-DM) 6.25-15 mg/5 mL Syrp; Take 5 mLs by mouth every 4 to 6 hours as needed (cough).  Dispense: 240 mL; Refill: 0    Recommend Coricidin HBP and Mucinex.   Conservative measures for the care of a viral URI discussed and given to the patient in the AVS today.  Patient voiced understanding.  Discussed with the patient that there is no clinical evidence to " support the use of antibiotic therapy at this time.  Will followup as needed or if condition worsens.

## 2017-02-20 NOTE — PATIENT INSTRUCTIONS
Adult Self-Care for Colds   Colds are caused by viruses. They cant be cured with antibiotics. However, you can relieve symptoms and support your bodys efforts to heal itself. No matter which symptoms you have, be sure to drink plenty of fluids (water or clear soup); stop smoking and drinking alcohol; and get plenty of rest.   Over the counter cold medications can be helpful in treating your symptoms. Patients with high blood pressure should avoid decongestants as they can cause the blood pressure to elevate. High blood pressure patients can usually take Coricidin HBP for cold and flu symptoms. Discussing your over the counter treatment with a pharmacist is also a very good idea as they can help you pick out medications that are safest for you.  It is usually best if you try and treat your cold yourself for at least 7-10 days before going to the doctor. There is actually very little your doctor can do or prescribe for the common cold.    Understand a Fever   Take your temperature several times a day. If your fever is 100.4°F for more than a day, call your doctor.   Relax, lie down. Go to bed if you want. Just get off your feet and rest. Also, drink plenty of fluids to avoid dehydration.   Take acetaminophen or a nonsteroidal anti-inflammatory agent (NSAID), such as ibuprofen.  Treat a Troubled Nose Kindly   Breathe steam or heated humidified air to open blocked nasal passages.  a hot shower or use a vaporizer. Be careful not to get burned by the steam.   Saline nasal sprays and decongestant tablets help open a stuffy nose. Antihistamines can also help, but they can cause side effects such as drowsiness and drying of the eyes, nose, and mouth.  Soothe a Sore Throat and Cough   Gargle every 2 hours with 1/4 teaspoon of salt dissolved in 1/2 cup of warm water. Suck on throat lozenges and cough drops to moisten your throat.   Cough medicines are available but it is unclear how effective they actually are.    Take acetaminophen or an NSAID, such as ibuprofen.  Ease Digestive Problems   Put fluid back into your body. Take frequent sips of clear liquids such as water or broth. Do not drink beverages with a lot of sugar in them, such as juices and sodas. These can make diarrhea worse. Older children and adults can drink sports drinks.   As your appetite returns, you can resume your normal diet. Ask your doctor whether there are any foods you should avoid.  When to Call Your Doctor   Call your doctor if you have any of the following symptoms or if you arent feeling better after 10 days:   Shortness of breath   Pain or pressure in the chest or abdomen   Worsening symptoms, especially after a period of improvement   Fever of 100.4°F (38.0°C) or higher, or fever that doesnt go down with medication   Sudden dizziness or confusion   Severe or continued vomiting   Signs of dehydration, including extreme thirst, dark urine, infrequent urination, dry mouth   Spotted, red, or very sore throat

## 2017-02-20 NOTE — MR AVS SNAPSHOT
[facility]  [address]  [place] [address]  Phone: [phone]  Fax: [phone]                  Isha Leonard   2017 11:00 AM   Office Visit    Description:  Female : 1955   Provider:  JOCELYN Bagley II, MD   Department:  [facility]           Reason for Visit     URI           Diagnoses this Visit        Comments    Viral URI with cough    -  Primary            To Do List           Future Appointments        Provider Department Dept Phone    2017 4:00 PM Amparo Givens PT [facility] [phone]    2017 8:30 AM Ese Salazar MD [facility] [phone]      Your Future Surgeries/Procedures     Mar 03, 2017   Surgery with Mauri Richardson MD   [facility] ([facility])    [address] [address] [phone]              Goals (5 Years of Data)     None       These Medications        Disp Refills Start End    promethazine-dextromethorphan (PROMETHAZINE-DM) 6.25-15 mg/5 mL Syrp 240 mL 0 2017 3/2/2017    Take 5 mLs by mouth every 4 to 6 hours as needed (cough). - Oral    Pharmacy: [id number] - [address] Ph #: [phone]         [facility]     [facility] Care [facility] -  Assistance  Registered nurses in the [facility] provide clinical advisement, health education, appointment booking, and other advisory services.  Call for this free service at [phone].             Medications           Message regarding Medications     Verify the changes and/or additions to your medication regime listed below are the same as discussed with your clinician today.  If any of these changes or additions are incorrect, please notify your healthcare provider.        START taking these NEW medications        Refills    promethazine-dextromethorphan (PROMETHAZINE-DM) 6.25-15 mg/5  "mL Syrp 0    Sig: Take 5 mLs by mouth every 4 to 6 hours as needed (cough).    Class: Normal    Route: Oral           Verify that the below list of medications is an accurate representation of the medications you are currently taking.  If none reported, the list may be blank. If incorrect, please contact your healthcare provider. Carry this list with you in case of emergency.           Current Medications     levothyroxine (SYNTHROID) 100 MCG tablet Take 1 tablet (100 mcg total) by mouth once daily.    loratadine (CLARITIN) 10 mg tablet Take 10 mg by mouth once daily.    promethazine-dextromethorphan (PROMETHAZINE-DM) 6.25-15 mg/5 mL Syrp Take 5 mLs by mouth every 4 to 6 hours as needed (cough).    sodium chloride (SALINE NASAL) 0.65 % nasal spray 1 spray by Nasal route as needed for Congestion.    sodium,potassium,mag sulfates (SUPREP BOWEL PREP KIT) 17.5-3.13-1.6 gram SolR As directed-dispense 1 kit           Clinical Reference Information           Your Vitals Were     BP Pulse Temp Height Weight SpO2    124/80 76 98.3 °F (36.8 °C) 5' 3" (1.6 m) 67.5 kg (148 lb 13 oz) 97%    BMI                26.36 kg/m2          Blood Pressure          Most Recent Value    BP  124/80      Allergies as of 2/20/2017     No Known Allergies      Immunizations Administered on Date of Encounter - 2/20/2017     None      Instructions    Adult Self-Care for Colds   Colds are caused by viruses. They cant be cured with antibiotics. However, you can relieve symptoms and support your bodys efforts to heal itself. No matter which symptoms you have, be sure to drink plenty of fluids (water or clear soup); stop smoking and drinking alcohol; and get plenty of rest.   Over the counter cold medications can be helpful in treating your symptoms. Patients with high blood pressure should avoid decongestants as they can cause the blood pressure to elevate. High blood pressure patients can usually take Coricidin HBP for cold and flu symptoms. " Discussing your over the counter treatment with a pharmacist is also a very good idea as they can help you pick out medications that are safest for you.  It is usually best if you try and treat your cold yourself for at least 7-10 days before going to the doctor. There is actually very little your doctor can do or prescribe for the common cold.    Understand a Fever   Take your temperature several times a day. If your fever is 100.4°F for more than a day, call your doctor.   Relax, lie down. Go to bed if you want. Just get off your feet and rest. Also, drink plenty of fluids to avoid dehydration.   Take acetaminophen or a nonsteroidal anti-inflammatory agent (NSAID), such as ibuprofen.  Treat a Troubled Nose Kindly   Breathe steam or heated humidified air to open blocked nasal passages.  a hot shower or use a vaporizer. Be careful not to get burned by the steam.   Saline nasal sprays and decongestant tablets help open a stuffy nose. Antihistamines can also help, but they can cause side effects such as drowsiness and drying of the eyes, nose, and mouth.  Soothe a Sore Throat and Cough   Gargle every 2 hours with 1/4 teaspoon of salt dissolved in 1/2 cup of warm water. Suck on throat lozenges and cough drops to moisten your throat.   Cough medicines are available but it is unclear how effective they actually are.   Take acetaminophen or an NSAID, such as ibuprofen.  Ease Digestive Problems   Put fluid back into your body. Take frequent sips of clear liquids such as water or broth. Do not drink beverages with a lot of sugar in them, such as juices and sodas. These can make diarrhea worse. Older children and adults can drink sports drinks.   As your appetite returns, you can resume your normal diet. Ask your doctor whether there are any foods you should avoid.  When to Call Your Doctor   Call your doctor if you have any of the following symptoms or if you arent feeling better after 10 days:   Shortness of  breath   Pain or pressure in the chest or abdomen   Worsening symptoms, especially after a period of improvement   Fever of 100.4°F (38.0°C) or higher, or fever that doesnt go down with medication   Sudden dizziness or confusion   Severe or continued vomiting   Signs of dehydration, including extreme thirst, dark urine, infrequent urination, dry mouth   Spotted, red, or very sore throat               Language Assistance Services     ATTENTION: Language assistance services are available, free of charge. Please call [phone].      ATENCIÓN: Si habla katybrittni, tiene a durham disposición servicios gratuitos de asistencia lingüística. Llame al [phone].     ELMIRA Ý: N?u b?n nói Ti?ng Vi?t, có các d?ch v? h? tr? ngôn ng? mi?n phí dành cho b?n. G?i s? [phone].         [facility] complies with applicable Federal civil rights laws and does not discriminate on the basis of race, color, national origin, age, disability, or sex.

## 2017-02-24 ENCOUNTER — CLINICAL SUPPORT (OUTPATIENT)
Dept: REHABILITATION | Facility: HOSPITAL | Age: 62
End: 2017-02-24
Attending: OBSTETRICS & GYNECOLOGY
Payer: COMMERCIAL

## 2017-02-24 DIAGNOSIS — R10.2 PELVIC PRESSURE IN FEMALE: Primary | ICD-10-CM

## 2017-02-24 DIAGNOSIS — M62.08 RECTUS DIASTASIS: Chronic | ICD-10-CM

## 2017-02-24 DIAGNOSIS — R53.1 WEAKNESS: ICD-10-CM

## 2017-02-24 PROCEDURE — 97161 PT EVAL LOW COMPLEX 20 MIN: CPT | Mod: PO | Performed by: PHYSICAL THERAPIST

## 2017-02-24 PROCEDURE — G8990 OTHER PT/OT CURRENT STATUS: HCPCS | Mod: CI,PO | Performed by: PHYSICAL THERAPIST

## 2017-02-24 PROCEDURE — G8991 OTHER PT/OT GOAL STATUS: HCPCS | Mod: CI,PO | Performed by: PHYSICAL THERAPIST

## 2017-02-24 NOTE — PATIENT INSTRUCTIONS
"Home Exercise Program: 2/24/17    TA set 2x10x5" - blow thru straw, fog mirror  Hooklying bilat ER BTB 2x10x5"  Hooklying add 2x10x5"  Glut set 2x10x5"    ALL TID  "

## 2017-02-24 NOTE — PROGRESS NOTES
Patient: Isha Leonard   [facility] #:  [id number]    Date of treatment: 02/24/2017   Time in: 4:15 (pt late arrival)  Time out: 5:15  # Visits: 1/24  Auth: ([email or web address]) 12/31/17  POC expiration: 5/24/17    Outpatient Physical Therapy   Initial Evaluation    Isha is a 61 y.o. female evaluated on 02/24/2017    Physician:  Ese Salazar MD   Diagnosis:   Encounter Diagnoses   Name Primary?   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    loratadine (CLARITIN) 10 mg tablet Take 10 mg by mouth once daily.    promethazine-dextromethorphan (PROMETHAZINE-DM) 6.25-15 mg/5 mL Syrp Take 5 mLs by mouth every 4 to 6 hours as needed (cough).    sodium chloride (SALINE NASAL) 0.65 % nasal spray 1 spray by Nasal route as needed for Congestion.    sodium,potassium,mag sulfates (SUPREP BOWEL PREP KIT) 17.5-3.13-1.6 gram SolR As directed-dispense 1 kit     No current facility-administered medications for this visit.        Allergies: Review of patient's allergies indicates:  No Known Allergies     OB/GYN History: childbirth vaginal delivery, prolapse and menopause, fibroids,    Bladder/Bowel History: none    Precautions: universal        Subjective     Spouse present with pt to assist with translation PRN. Spouse works in [place], so transportation may be an issue at subsequent visits.    Stress leakage with cough, sneeze, running. Pt reports she leaks sometimes and doesn't know why. Vaginal pressure occasionally, but this is improved from a few years ago. Heaviness of abdomen is very bothersome with ADLs and prevents her from regular exercise. Pt reports she has 7 fibroids, unsure of location.     Pain: Patient reports 0/10 with 0 being the lowest and 10 being the highest.     Previous treatment included PFPT  (eval only)    Frequency of Urination: Daytime: every couple hours        Nighttime: 2 - no concern/issue    Urine Stream: strong    Bladder Leakage: Yes  Frequency of incidents: couple times per week  Amount Leaked: drops    Frequency of Bowel Movements: once a day  Hyde Stool Chart: Type(s) 3-5    Colon Leakage: No    Form of Protection: none  Number of Pads required in 24 hours: 0 - pt will wear pad when she has a cold    Types of Fluid Intake: water, coffee   Current Exercise: no -  trying to encourage pt to go    Occupation: Pt is retired.     PLOF: Pt was independent with all ADLs and iADLs without pain, ambulating without pain or deviation, driving independently.    Patient's Goals: get tummy stronger and return to exercise    Objective     ORTHO SCREEN  Posture: flexed posture     Pelvic Alignment: DNA  SFMA Screen: Notable for DNA    ABDOMINALS  Scarring: none    Palpation: inc tension throughout abdomen, TTP generally    Diastasis: 4/3.5/4 finger widths  Abdominal strength: Rectus 2/5     Transverse not palpable initially, trace contraction with varying cues       VAGINAL PELVIC FLOOR EXAM  EXTERNAL ASSESSMENT  Skin Condition: WNL  Scarring: none  Sensation: WNL  Pain: none  Introitus: gaping   Voluntary Contraction: visible lift, small  Voluntary  Relaxation: visible drop, small  Involuntary Contraction: bulge, prolapse  Bearing Down: prolapse   Perineal Descent: present      INTERNAL ASSESSMENT  Pain: trigger points as follows: levators bilat, layer 2 bilat  Sensation: can feel generalized pressure, able to localize on R, not on L    Vaginal Vault: roomy  Muscle Bulk: hypertonus (low tone)  Muscle Power: 2/5 for 1st contraction, decreasing to 1/5 for subsequent  Muscle Endurance: 2 sec  # Reps To Fatigue: 2  Fast Contractions: DNA     Involuntary Contraction: bulge  Bearing Down: prolapse  Quality of Contraction: slow rise, decreased hold, slow relaxation and incomplete relaxation  Specificity: patient contracts: gluts, adductors   Coordination: tends to hold breath during PFM contration  Prolapse Check: stage 2 cystocele, stage 1 rectocele, stage 1 uterovaginal prolapse      SEMG EVALUATION: Deferred due to time constraints      Functional Limitations Reports - PFDI-20  Score: 9/60  Current: 15%  Goal: 3/60   <5%  Category: Other    FOTO COMPLETED    G-Code Modifiers  CH  0% Impaired, limited, or restricted    CI  19% - 1%  Impaired, limited, or restricted    CJ  20% - 39% Impaired, limited, or restricted    CK  40% - 59% Impaired, limited, or restricted    CL  60% - 79% Impaired, limited, or restricted    CM  80% - 99% Impaired, limited, or restricted    CN  100% Impaired, limited, or restricted          Education: Instructed on general anatomy/physiology of urinary/bowel system; discussed plan of care with patient; instructed in purpose of physical therapy and the  benefits/risks of treatment; instructed in risks of refusing treatment. Patient agreed to treatment plan. Sami demonstrated and verbalized understanding of all instruction and was provided with a handout of HEP (see Patient Instructions). Instructed pt to not do any crunches in future due to      Assessment     This is a 61 y.o. female referred to outpatient physical therapy and presents with  a medical diagnosis of pelvic pressure in female, HUMZA, and rectus diastasis and demonstrates limitations as described in the problem list. Pt presented today with profound weakness of entire core and moderate DR. Pt will benefit from physcial therapy services in order to maximize pain free functional independence. The following goals were discussed with the patient and patient is in agreement with them as to be addressed in the treatment plan. Pt was given a HEP as described in Patient Instruction. Pt verbally understood the instructions as they were given and demonstrated proper form and technique during therapy. Pt was advise to perform these exercises free of pain and to stop performing them if pain occurs.     Prognosis: good    Medical necessity is demonstrated by the following IMPAIRMENTS/PROBLEM LIST:   poor knowledge of body mechanics and posture, poor trunk stability, pelvic floor tenderness, perineal descent, decreased pelvic muscle strength, decreased endurance of the pelvic muscles, decreased phasic ability of the pelvid cmuscles, increased tension of the pelvic muscles, poor quality of pelvic muscle contraction, increased nocturia, poor coordination of pelvic floor muscles during ADL's leading to urinary or fecal leakage, dysfunctional voiding and dysfunctional defecation    Co-morbidities and personal factors: osteopenia, DR, transportation difficulties  Activity Limitations: putting off urge, cough  Participation restrictions: regular exercise  Clinical presentation: stable  Complexity: low    Barriers to Learning:  needed (moderate assistance from spouse)  Educational/Spiritual/Cultural needs: none  Environmental Barriers: none noted  Abuse/Neglect: no signs  Nutritional Status: well developed, well nourished  Fall Risk: none    GOALS  Short Term Goals: 1 month  1. Pt will verbalize improved awareness of PFM activity as palpated by PT in order to improve activity involvement with HEP.  2.  Pt will tolerate HEP to improve impairments and independence with ADL's.    Long Term Goals: 3 months  1. Pt will report <5% on PFDI-20 to demonstrate a decrease in disability and improvement in independence with functional activities.   2. Patient able to perform basic ADL with less leaking.,   3. Pelvic floor muscle contraction long enough to inhibit bladder contraction.,   4. Able to maintain normal breathing pattern during pelvic floor muscle contraction.,   5. Pt to report longer periods of standing activity without vaginal pressure.,   6. Pt to be I with self mgmt. of symptoms.    7. Pt to be I with HEP.    Plan     Pt will be treated by physical therapy 1-2 time(s) a week for 3 months for Pt Education, HEP, therapeutic exercises, neuromuscular re-education, therapeutic activity, gait training, manual therapy, and modalities (including SEMG) PRN to achieve established goals.

## 2017-02-27 ENCOUNTER — CLINICAL SUPPORT (OUTPATIENT)
Dept: REHABILITATION | Facility: HOSPITAL | Age: 62
End: 2017-02-27
Attending: OBSTETRICS & GYNECOLOGY
Payer: COMMERCIAL

## 2017-02-27 DIAGNOSIS — R10.2 PELVIC PRESSURE IN FEMALE: ICD-10-CM

## 2017-02-27 DIAGNOSIS — R53.1 WEAKNESS: ICD-10-CM

## 2017-02-27 DIAGNOSIS — M62.08 RECTUS DIASTASIS: ICD-10-CM

## 2017-02-27 DIAGNOSIS — N39.3 URINARY, INCONTINENCE, STRESS FEMALE: ICD-10-CM

## 2017-02-27 PROCEDURE — 97110 THERAPEUTIC EXERCISES: CPT | Mod: PO | Performed by: PHYSICAL THERAPIST

## 2017-02-27 NOTE — PROGRESS NOTES
"Patient: Isha Leonard   Clinic #: [id number]   Diagnosis:   Encounter Diagnoses   Name Primary?    Weakness     Rectus diastasis     Urinary, incontinence, stress female     Pelvic pressure in female       Date of start of care: 2/24/17  Date of treatment: 02/27/2017   Time in: 9:00  Time out: 9:45  # Visits: 2/24  Auth: (30) 12/31/17  POC expiration: 5/24/17  Outpatient Physical Therapy   Daily Note    Subjective     Pt stated she was able to get some of the exercises in over the weekend. She forgot how to do the "who's and ha's" exercise (TA sets)    Pain: Current: 0/10     Objective     TREATMENT  Therapeutic Exercise x40 min  TA set with one breath 4x5 each - blow thru straw, fog mirror  Hooklying bilat ER BTB 2x10x5"  Hooklying add 2x10x5"  Glut set 2x10x5"  Kegel 4x5x5" bilat with internal cueing (hooklying)      Education: Discussed progression of plan of care with patient; educated pt in activity modification; reviewed HEP with pt. Pt demonstrated and verbalized understanding of all instruction and was provided with a handout of HEP (see Patient Instructions). Again provided pt with BTB for compliance with HEP, at pt request. Reinforced the importance of completing HEP TID.     Will add hooklying kegels to HEP next visit.    Assessment     Pt tolerated treatment well with no visible adverse affects. Pt required frequent cueing for HEP. Pt had quick fatigue today with exercises with pt unable to tolerate 60 min of exercising at this time. Will perform TA set + kegel with SEMG next visit for continued progressing in strength and coordination of each without breath holding. Will continue to progress pt to tolerance to improve strength and coordination in order to improve vaginal pressure, urinary leakage, and abdominal discomfort.    Plan     Continue with established POC, working toward PT goals.            "

## 2017-03-02 ENCOUNTER — TELEPHONE (OUTPATIENT)
Dept: ENDOSCOPY | Facility: HOSPITAL | Age: 62
End: 2017-03-02

## 2017-03-02 NOTE — TELEPHONE ENCOUNTER
Pre services is still waiting for patient to call regarding financial arrangements for colonoscopy scheduled on 3/3/17. Called and left a voicemail message to call.

## 2017-03-03 ENCOUNTER — ANESTHESIA (OUTPATIENT)
Dept: ENDOSCOPY | Facility: HOSPITAL | Age: 62
End: 2017-03-03
Payer: COMMERCIAL

## 2017-03-03 ENCOUNTER — HOSPITAL ENCOUNTER (OUTPATIENT)
Facility: HOSPITAL | Age: 62
Discharge: HOME | End: 2017-03-03
Attending: COLON & RECTAL SURGERY | Admitting: COLON & RECTAL SURGERY
Payer: COMMERCIAL

## 2017-03-03 ENCOUNTER — ANESTHESIA EVENT (OUTPATIENT)
Dept: ENDOSCOPY | Facility: HOSPITAL | Age: 62
End: 2017-03-03
Payer: COMMERCIAL

## 2017-03-03 ENCOUNTER — SURGERY (OUTPATIENT)
Age: 62
End: 2017-03-03

## 2017-03-03 VITALS
RESPIRATION RATE: 17 BRPM | BODY MASS INDEX: 25.69 KG/M2 | WEIGHT: 145 LBS | TEMPERATURE: 98 F | SYSTOLIC BLOOD PRESSURE: 110 MMHG | HEIGHT: 63 IN | DIASTOLIC BLOOD PRESSURE: 66 MMHG | HEART RATE: 67 BPM | OXYGEN SATURATION: 97 %

## 2017-03-03 DIAGNOSIS — R73.02 GLUCOSE INTOLERANCE (IMPAIRED GLUCOSE TOLERANCE): ICD-10-CM

## 2017-03-03 DIAGNOSIS — N39.3 URINARY, INCONTINENCE, STRESS FEMALE: ICD-10-CM

## 2017-03-03 DIAGNOSIS — R10.2 PELVIC PRESSURE IN FEMALE: ICD-10-CM

## 2017-03-03 DIAGNOSIS — E03.9 ACQUIRED HYPOTHYROIDISM: ICD-10-CM

## 2017-03-03 DIAGNOSIS — K80.20 GALLSTONES: ICD-10-CM

## 2017-03-03 DIAGNOSIS — R53.1 WEAKNESS: ICD-10-CM

## 2017-03-03 DIAGNOSIS — E78.01 FAMILIAL HYPERCHOLESTEROLEMIA: Primary | ICD-10-CM

## 2017-03-03 DIAGNOSIS — Z13.9 SCREENING: ICD-10-CM

## 2017-03-03 DIAGNOSIS — M62.08 RECTUS DIASTASIS: Chronic | ICD-10-CM

## 2017-03-03 DIAGNOSIS — N81.11 MIDLINE CYSTOCELE: ICD-10-CM

## 2017-03-03 DIAGNOSIS — K76.0 FATTY LIVER: ICD-10-CM

## 2017-03-03 DIAGNOSIS — D12.6 COLON ADENOMA: ICD-10-CM

## 2017-03-03 PROCEDURE — 88305 TISSUE EXAM BY PATHOLOGIST: CPT | Mod: 26,,, | Performed by: PATHOLOGY

## 2017-03-03 PROCEDURE — 27201012 HC FORCEPS, HOT/COLD, DISP: Performed by: COLON & RECTAL SURGERY

## 2017-03-03 PROCEDURE — 63600175 PHARM REV CODE 636 W HCPCS: Performed by: NURSE ANESTHETIST, CERTIFIED REGISTERED

## 2017-03-03 PROCEDURE — 45380 COLONOSCOPY AND BIOPSY: CPT | Mod: 33,,, | Performed by: COLON & RECTAL SURGERY

## 2017-03-03 PROCEDURE — 45380 COLONOSCOPY AND BIOPSY: CPT | Performed by: COLON & RECTAL SURGERY

## 2017-03-03 PROCEDURE — 37000009 HC ANESTHESIA EA ADD 15 MINS: Performed by: COLON & RECTAL SURGERY

## 2017-03-03 PROCEDURE — 37000008 HC ANESTHESIA 1ST 15 MINUTES: Performed by: COLON & RECTAL SURGERY

## 2017-03-03 PROCEDURE — D9220A PRA ANESTHESIA: Mod: 33,CRNA,, | Performed by: NURSE ANESTHETIST, CERTIFIED REGISTERED

## 2017-03-03 PROCEDURE — 25000003 PHARM REV CODE 250: Performed by: NURSE PRACTITIONER

## 2017-03-03 PROCEDURE — 88305 TISSUE EXAM BY PATHOLOGIST: CPT | Performed by: PATHOLOGY

## 2017-03-03 PROCEDURE — 25000003 PHARM REV CODE 250: Performed by: NURSE ANESTHETIST, CERTIFIED REGISTERED

## 2017-03-03 PROCEDURE — D9220A PRA ANESTHESIA: Mod: 33,ANES,, | Performed by: ANESTHESIOLOGY

## 2017-03-03 RX ORDER — LIDOCAINE HCL/PF 100 MG/5ML
SYRINGE (ML) INTRAVENOUS
Status: DISCONTINUED | OUTPATIENT
Start: 2017-03-03 | End: 2017-03-03

## 2017-03-03 RX ORDER — SODIUM CHLORIDE 9 MG/ML
INJECTION, SOLUTION INTRAVENOUS CONTINUOUS
Status: DISCONTINUED | OUTPATIENT
Start: 2017-03-03 | End: 2017-03-03 | Stop reason: HOSPADM

## 2017-03-03 RX ORDER — PROPOFOL 10 MG/ML
VIAL (ML) INTRAVENOUS CONTINUOUS PRN
Status: DISCONTINUED | OUTPATIENT
Start: 2017-03-03 | End: 2017-03-03

## 2017-03-03 RX ORDER — PROPOFOL 10 MG/ML
VIAL (ML) INTRAVENOUS
Status: DISCONTINUED | OUTPATIENT
Start: 2017-03-03 | End: 2017-03-03

## 2017-03-03 RX ADMIN — LIDOCAINE HYDROCHLORIDE 75 MG: 20 INJECTION, SOLUTION INTRAVENOUS at 07:03

## 2017-03-03 RX ADMIN — SODIUM CHLORIDE: 0.9 INJECTION, SOLUTION INTRAVENOUS at 07:03

## 2017-03-03 RX ADMIN — PROPOFOL 150 MCG/KG/MIN: 10 INJECTION, EMULSION INTRAVENOUS at 07:03

## 2017-03-03 RX ADMIN — PROPOFOL 80 MG: 10 INJECTION, EMULSION INTRAVENOUS at 07:03

## 2017-03-03 NOTE — TRANSFER OF CARE
"Anesthesia Transfer of Care Note    Patient: Isha Leonard    Procedure(s) Performed: Procedure(s) (LRB):  COLONOSCOPY (N/A)    Patient location: GI    Anesthesia Type: general    Transport from OR: Transported from OR on room air with adequate spontaneous ventilation    Post pain: adequate analgesia    Post assessment: no apparent anesthetic complications    Post vital signs: stable    Level of consciousness: awake    Nausea/Vomiting: no nausea/vomiting    Complications: none          Last vitals:   Visit Vitals    BP (!) 93/53 (BP Location: Left arm, Patient Position: Lying, BP Method: Automatic)    Pulse 71    Temp 36.7 °C (98 °F) (Oral)    Resp 16    Ht 5' 3" (1.6 m)    Wt 65.8 kg (145 lb)    SpO2 (!) 94%    BMI 25.69 kg/m2     "

## 2017-03-03 NOTE — DISCHARGE INSTRUCTIONS
Colorectal Cancer Screening    Colorectal cancer (cancer in the colon or rectum) is a leading cause of cancer deaths in the [place] But it doesnt have to be. When this cancer is found and removed early, the chances of a full recovery are very good. Because colorectal cancer rarely causes symptoms in its early stages, screening for the disease is important. Its even more crucial if you have risk factors for the disease. Learn more about colorectal cancer and its risk factors. Then talk to your healthcare provider about being screened. You could be saving your own life.  Risk factors for colorectal cancer  Your risk of having colorectal cancer increases if you:  · Are 50 years of age or older  · Have a family history or personal history of colorectal cancer or polyps  · Have a personal history of type 2 diabetes, Crohns disease, or ulcerative colitis  · Have an inherited genetic syndrome like Corbett syndrome (also known as HNPCC) or familial adenomatous polyposis (FAP)  · Are very overweight  · Are not physically active  · Smoke  · Drink a lot of alcohol  · Eat a lot of red or processed meat  The colon and rectum  Waste from food you eat enters the colon from the small intestine. As it travels through the colon, the waste (stool) loses water and becomes more solid. Intestinal muscles push it toward the sigmoid--the last section of the colon. Stool then moves into the rectum, where its stored until its ready to leave the body during a bowel movement.  How cancer develops  Polyps are growths that form on the inner lining of the colon or rectum. Most are benign, which means they arent cancerous. But over time, some polyps can become cancer (malignant). This happens when cells in these polyps begin growing abnormally. In time, malignant cells invade more and more of the colon and rectum. The cancer may also spread to nearby organs or lymph nodes or to other parts of the body. Finding and removing polyps can help  prevent cancer from ever forming.  Your screening  Screening means looking for a health problem before you have symptoms. During screening for colorectal cancer, your healthcare provider will ask about your health history, examine you, and do one or more tests.  History and exam  The history and exam involve the following:  · Health history. Your healthcare provider will ask about your health history. Mention if a family member has had colon cancer or polyps. Also mention any health problems you have had in the past.  · Digital rectal exam (NINO). During a NINO, the healthcare provider inserts a lubricated gloved finger into the rectum. The test is painless and takes less than a minute. Healthcare providers agree that this test alone is not enough to screen for colorectal cancer.  Screening test choices  Fecal occult blood test (FOBT) or fecal immunochemical test (FIT)  These tests check for occult blood in stool (blood you cant see). Hidden blood may be a sign of colon polyps or cancer. A small sample of stool is tested for blood in a laboratory. Most often, you collect this sample at home using a kit your healthcare provider gives you. Follow the instructions carefully for using this kit. You might need to avoid certain foods and medicines before the test, as directed.  Barium enema with contrast (double-contrast barium enema)  This test uses X-rays to provide images of the entire colon and rectum. The day before this test, you will need to do a bowel prep to clean out the colon and rectum. A bowel prep is a liquid diet plus strong laxatives or enemas. You will be awake for the test, but you may be given medicine to help you relax. At the start of the test, a radiologist (a healthcare provider who specializes in imaging tests) places a soft tube into the rectum. The tube is used to fill the colon with a contrast liquid (barium) and air. This can be uncomfortable for some people. The liquid helps the colon show up  clearly on the X-rays. Because the test uses X-rays, it exposes you to a small amount of radiation.  Virtual colonoscopy  This exam is also called a CT colonography. It uses a series of X-ray photographs to create a 3-D view of the colon and rectum. The day before the test, you will need to do a bowel prep to clean out your colon. Your healthcare provider will give you instructions on how to do this. During the procedure, you will lie on a table that is part of a special X-ray machine called a CT scanner. A small tube will be placed into your rectum to fill the colon and rectum with air. This can be uncomfortable for some people. Then, the table will move into the machine and pictures will be taken of your colon and rectum. A computer will combine these photos to create a 3-D picture. Because the test uses X-rays, it exposes you to a small amount of radiation.  Scope exams  Here are two types of scope exams:  · Colonoscopy. This test can be used to find and remove polyps anywhere in the colon or rectum. The day before the test, you will do a bowel prep. This is a liquid diet plus a strong laxative solution or an enema. The bowel prep will cleanse your colon. You will be given instructions for this. Just before the test, you are given a medicine to make you sleepy. Then, a long, flexible, lighted tube called a colonoscope is gently inserted into the rectum and guided through the entire colon. Images of the colon are viewed on a video screen. Any polyps that are found are removed and sent to a lab for testing. If a polyp cant be removed, a sample of tissue is taken and the polyp might be removed later during surgery. You will need to bring someone with you to drive you home after this test.   · Sigmoidoscopy. This test is similar to colonoscopy, but focuses only on the sigmoid colon and rectum. As with colonoscopy, bowel prep must be done the day before this test. It might not need to be as complete as the bowel prep  for a colonoscopy. You are awake during the procedure, but you may be given medicine to help you relax. During the test, the healthcare provider guides a thin, flexible, lighted tube called a sigmoidoscope through your rectum and lower colon. The images are displayed on a video screen. Polyps are removed, if possible, and sent to a lab for testing.  Colonoscopy is the only screening test that lets your healthcare provider see the entire colon and rectum. This test also lets your healthcare provider remove any pieces of tissue that need to be looked at by a lab. If something suspicious is found using any other tests, you will likely need a colonoscopy.     When to call your healthcare provider after a test  Call your healthcare provider if you have any of the following after any screening test:  · Bleeding  · Fever of 100.4°F (38°C) or higher, or as directed by your healthcare provider  · Abdominal pain  · Vomiting   Date Last Reviewed: 11/4/2015  © 7491-1182 [employer]. [address] [place] [address]. All rights reserved. This information is not intended as a substitute for professional medical care. Always follow your healthcare professional's instructions.        Colonoscopy     A camera attached to a flexible tube with a viewing lens is used to take video pictures.     Colonoscopy is a test to view the inside of your lower digestive tract (colon and rectum). Sometimes it can show the last part of the small intestine (ileum). During the test, small pieces of tissue may be removed for testing. This is called a biopsy. Small growths, such as polyps, may also be removed.   Why is colonoscopy done?  The test is done to help look for colon cancer. And it can help find the source of abdominal pain, bleeding, and changes in bowel habits. It may be needed once a year, depending on factors such as your:  · Age  · Health history  · Family health history  · Symptoms  · Results from any prior  colonoscopy  Risks and possible complications  These include:  · Bleeding               · A puncture or tear in the colon   · Risks of anesthesia  · A cancer lesion not being seen  Getting ready   To prepare for the test:  · Talk with your healthcare provider about the risks of the test (see below). Also ask your healthcare provider about alternatives to the test.  · Tell your healthcare provider about any medicines you take. Also tell him or her about any health conditions you may have.  · Make sure your rectum and colon are empty for the test. Follow the diet and bowel prep instructions exactly. If you dont, the test may need to be rescheduled.  · Plan for a friend or family member to drive you home after the test.     Colonoscopy provides an inside view of the entire colon.     You may discuss the results with your doctor right away or at a future visit.  During the test   The test is usually done in the hospital on an outpatient basis. This means you go home the same day. The procedure takes about 30 minutes. During that time:  · You are given relaxing (sedating) medicine through an IV line. You may be drowsy, or fully asleep.  · The healthcare provider will first give you a physical exam to check for anal and rectal problems.  · Then the anus is lubricated and the scope inserted.  · If you are awake, you may have a feeling similar to needing to have a bowel movement. You may also feel pressure as air is pumped into the colon. Its OK to pass gas during the procedure.  · Biopsy, polyp removal, or other treatments may be done during the test.  After the test   You may have gas right after the test. It can help to try to pass it to help prevent later bloating. Your healthcare provider may discuss the results with you right away. Or you may need to schedule a follow-up visit to talk about the results. After the test, you can go back to your normal eating and other activities. You may be tired from the sedation and  need to rest for a few hours.  Date Last Reviewed: 11/1/2016  © 7903-3710 [employer]. [address] [place] [address], [place] [address]. All rights reserved. This information is not intended as a substitute for professional medical care. Always follow your healthcare professional's instructions.        Understanding Colon and Rectal Polyps    The colon (also called the large intestine) is a muscular tube that forms the last part of the digestive tract. It absorbs water and stores food waste. The colon is about 4 to 6 feet long. The rectum is the last 6 inches of the colon. The colon and rectum have a smooth lining composed of millions of cells. Changes in these cells can lead to growths in the colon that can become cancerous and should be removed. Multiple tests are available to screen for colon cancer, but the colonoscopy is the most recommended test. During colonoscopy, these polyps can be removed. How often you need this test depends on many things including your condition, your family history, symptoms, and what the findings were at the previous colonoscopy.   When the colon lining changes  Changes that happen in the cells that line the colon or rectum can lead to growths called polyps. Over a period of years, polyps can turn cancerous. Removing polyps early may prevent cancer from ever forming.  Polyps  Polyps are fleshy clumps of tissue that form on the lining of the colon or rectum. Small polyps are usually benign (not cancerous). However, over time, cells in a polyp can change and become cancerous. Certain types of polyps known as adenomatous polyps are premalignant. The risk for invasive cancer increases with the size of the polyp and certain cell and gene features. This means that they can become cancerous if they're not removed. Hyperplastic polyps are benign. They can grow quite large and not turn cancerous.   Cancer  Almost all colorectal cancers start when polyp cells begin growing abnormally.  As a cancerous tumor grows, it may involve more and more of the colon or rectum. In time, cancer can also grow beyond the colon or rectum and spread to nearby organs or to glands called lymph nodes. The cells can also travel to other parts of the body. This is known as metastasis. The earlier a cancerous tumor is removed, the better the chance of preventing its spread.    Date Last Reviewed: 8/1/2016  © 1512-0687 [employer]. [address] [place] [address], [place] [address]. All rights reserved. This information is not intended as a substitute for professional medical care. Always follow your healthcare professional's instructions.

## 2017-03-03 NOTE — ANESTHESIA PREPROCEDURE EVALUATION
03/03/2017  Isha Leonard is a 61 y.o., female.    [facility] Anesthesia Evaluation    I have reviewed the Patient Summary Reports.    I have reviewed the Nursing Notes.   I have reviewed the Medications.     Review of Systems  Anesthesia Hx:  No problems with previous Anesthesia    Cardiovascular:   Hypertension Denies MI.  Denies CAD.       Pulmonary:   Denies Shortness of breath.  Denies Sleep Apnea.    Hepatic/GI:   GERD, well controlled Liver Disease, Fatty Liver   Endocrine:   Hypothyroidism        Physical Exam  General:  Well nourished    Airway/Jaw/Neck:  Airway Findings: Mouth Opening: Normal Tongue: Normal  General Airway Assessment: Adult  Mallampati: II  TM Distance: 4 - 6 cm  Jaw/Neck Findings:  Neck ROM: Normal ROM      Dental:  Dental Findings: In tact        Mental Status:  Mental Status Findings:  Cooperative, Alert and Oriented         Anesthesia Plan  Type of Anesthesia, risks & benefits discussed:  Anesthesia Type:  general  Patient's Preference: GA  Intra-op Monitoring Plan:   Intra-op Monitoring Plan Comments:   Post Op Pain Control Plan:   Post Op Pain Control Plan Comments:   Induction:   IV  Beta Blocker:  Patient is not currently on a Beta-Blocker (No further documentation required).       Informed Consent: Patient understands risks and agrees with Anesthesia plan.  Questions answered. Anesthesia consent signed with patient.  ASA Score: 2     Day of Surgery Review of History & Physical:    H&P update referred to the surgeon.         Ready For Surgery From Anesthesia Perspective.

## 2017-03-03 NOTE — IP AVS SNAPSHOT
[facility]  [address]  [facility] [address]  Phone: [phone]           Patient Discharge Instructions     Our goal is to set you up for success. This packet includes information on your condition, medications, and your home care. It will help you to care for yourself so you don't get sicker and need to go back to the hospital.     Please ask your nurse if you have any questions.        There are many details to remember when preparing to leave the hospital. Here is what you will need to do:    1. Take your medicine. If you are prescribed medications, review your Medication List in the following pages. You may have new medications to  at the pharmacy and others that you'll need to stop taking. Review the instructions for how and when to take your medications. Talk with your doctor or nurses if you are unsure of what to do.     2. Go to your follow-up appointments. Specific follow-up information is listed in the following pages. Your may be contacted by a transition nurse or clinical provider about future appointments. Be sure we have all of the phone numbers to reach you, if needed. Please contact your provider's office if you are unable to make an appointment.     3. Watch for warning signs. Your doctor or nurse will give you detailed warning signs to watch for and when to call for assistance. These instructions may also include educational information about your condition. If you experience any of warning signs to your health, call your doctor.               [facility]  Unless otherwise directed by your provider, please contact [facility], our nurse care line that is available for 24/7 assistance.     [phone] (toll-free)    Registered nurses in the [facility] provide clinical advisement, health education, appointment booking, and other advisory services.                    ** Verify the list of medication(s) below is accurate and up  to date. Carry this with you in case of emergency. If your medications have changed, please notify your healthcare provider.             Medication List      CONTINUE taking these medications        Additional Info                      levothyroxine 100 MCG tablet   Commonly known as:  SYNTHROID   Quantity:  90 tablet   Refills:  3   Dose:  100 mcg    Instructions:  Take 1 tablet (100 mcg total) by mouth once daily.     Begin Date    AM    Noon    PM    Bedtime       loratadine 10 mg tablet   Commonly known as:  CLARITIN   Refills:  0   Dose:  10 mg    Instructions:  Take 10 mg by mouth once daily.     Begin Date    AM    Noon    PM    Bedtime       SALINE NASAL 0.65 % nasal spray   Refills:  0   Dose:  1 spray   Generic drug:  sodium chloride    Instructions:  1 spray by Nasal route as needed for Congestion.     Begin Date    AM    Noon    PM    Bedtime         ASK your doctor about these medications        Additional Info                      promethazine-dextromethorphan 6.25-15 mg/5 mL Syrp   Commonly known as:  PROMETHAZINE-DM   Quantity:  240 mL   Refills:  0   Dose:  5 mL   Ask about: Should I take this medication?    Instructions:  Take 5 mLs by mouth every 4 to 6 hours as needed (cough).     Begin Date    AM    Noon    PM    Bedtime                  Please bring to all follow up appointments:    1. A copy of your discharge instructions.  2. All medicines you are currently taking in their original bottles.  3. Identification and insurance card.    Please arrive 15 minutes ahead of scheduled appointment time.    Please call 24 hours in advance if you must reschedule your appointment and/or time.        Your Scheduled Appointments     Mar 17, 2017  9:00 AM CDT   Est Pelvic Floor 60 Mins with Amparo Givens PT   [facility] ([facility])    [address] [place] [address]  [place] [address]   [phone]            Mar 20, 2017  9:00 AM CDT   Est Pelvic Floor 60 Mins with Amparo Givens PT   [facility]    [address] [facility]  [place] [address]   [phone]            Mar 24, 2017  9:00 AM CDT   Est Pelvic Floor 60 Mins with Amparo Givens PT   [facility] ([facility])    [address] [place] [address]   [phone]            Mar 27, 2017  9:00 AM CDT   Est Pelvic Floor 60 Mins with Amparo Givens PT   [facility] ([facility])    [address] [facility]  [place] [address]   [phone]            Mar 31, 2017  9:00 AM CDT   Est Pelvic Floor 60 Mins with Amparo Givens PT   [facility] ([facility])    [address] [facility] [place] [facility]  [place] [address]   [phone]                Discharge Instructions     Future Orders    Activity as tolerated     Diet general     Questions:    Total calories:      Fat restriction, if any:      Protein restriction, if any:      Na restriction, if any:      Fluid restriction:      Additional restrictions:          Discharge Instructions         Colorectal Cancer Screening    Colorectal cancer (cancer in the colon or rectum) is a leading cause of cancer deaths in the [place] But it doesnt have to be. When this cancer is found and removed early, the chances of a full recovery are very good. Because colorectal cancer rarely causes symptoms in its early stages, screening for the disease is important. Its even more crucial if you have risk factors for the disease. Learn more about colorectal cancer and its risk factors. Then talk to your healthcare provider about being screened. You could be saving your own life.  Risk factors for colorectal cancer  Your risk of having colorectal cancer increases if you:  · Are 50 years of age or older  · Have a family history or personal history of colorectal cancer or polyps  · Have a personal history of type 2 diabetes, Crohns disease, or ulcerative colitis  · Have an inherited genetic syndrome like Corbett syndrome (also known as HNPCC) or  familial adenomatous polyposis (FAP)  · Are very overweight  · Are not physically active  · Smoke  · Drink a lot of alcohol  · Eat a lot of red or processed meat  The colon and rectum  Waste from food you eat enters the colon from the small intestine. As it travels through the colon, the waste (stool) loses water and becomes more solid. Intestinal muscles push it toward the sigmoid--the last section of the colon. Stool then moves into the rectum, where its stored until its ready to leave the body during a bowel movement.  How cancer develops  Polyps are growths that form on the inner lining of the colon or rectum. Most are benign, which means they arent cancerous. But over time, some polyps can become cancer (malignant). This happens when cells in these polyps begin growing abnormally. In time, malignant cells invade more and more of the colon and rectum. The cancer may also spread to nearby organs or lymph nodes or to other parts of the body. Finding and removing polyps can help prevent cancer from ever forming.  Your screening  Screening means looking for a health problem before you have symptoms. During screening for colorectal cancer, your healthcare provider will ask about your health history, examine you, and do one or more tests.  History and exam  The history and exam involve the following:  · Health history. Your healthcare provider will ask about your health history. Mention if a family member has had colon cancer or polyps. Also mention any health problems you have had in the past.  · Digital rectal exam (NINO). During a NINO, the healthcare provider inserts a lubricated gloved finger into the rectum. The test is painless and takes less than a minute. Healthcare providers agree that this test alone is not enough to screen for colorectal cancer.  Screening test choices  Fecal occult blood test (FOBT) or fecal immunochemical test (FIT)  These tests check for occult blood in stool (blood you cant see).  Hidden blood may be a sign of colon polyps or cancer. A small sample of stool is tested for blood in a laboratory. Most often, you collect this sample at home using a kit your healthcare provider gives you. Follow the instructions carefully for using this kit. You might need to avoid certain foods and medicines before the test, as directed.  Barium enema with contrast (double-contrast barium enema)  This test uses X-rays to provide images of the entire colon and rectum. The day before this test, you will need to do a bowel prep to clean out the colon and rectum. A bowel prep is a liquid diet plus strong laxatives or enemas. You will be awake for the test, but you may be given medicine to help you relax. At the start of the test, a radiologist (a healthcare provider who specializes in imaging tests) places a soft tube into the rectum. The tube is used to fill the colon with a contrast liquid (barium) and air. This can be uncomfortable for some people. The liquid helps the colon show up clearly on the X-rays. Because the test uses X-rays, it exposes you to a small amount of radiation.  Virtual colonoscopy  This exam is also called a CT colonography. It uses a series of X-ray photographs to create a 3-D view of the colon and rectum. The day before the test, you will need to do a bowel prep to clean out your colon. Your healthcare provider will give you instructions on how to do this. During the procedure, you will lie on a table that is part of a special X-ray machine called a CT scanner. A small tube will be placed into your rectum to fill the colon and rectum with air. This can be uncomfortable for some people. Then, the table will move into the machine and pictures will be taken of your colon and rectum. A computer will combine these photos to create a 3-D picture. Because the test uses X-rays, it exposes you to a small amount of radiation.  Scope exams  Here are two types of scope exams:  · Colonoscopy. This test  can be used to find and remove polyps anywhere in the colon or rectum. The day before the test, you will do a bowel prep. This is a liquid diet plus a strong laxative solution or an enema. The bowel prep will cleanse your colon. You will be given instructions for this. Just before the test, you are given a medicine to make you sleepy. Then, a long, flexible, lighted tube called a colonoscope is gently inserted into the rectum and guided through the entire colon. Images of the colon are viewed on a video screen. Any polyps that are found are removed and sent to a lab for testing. If a polyp cant be removed, a sample of tissue is taken and the polyp might be removed later during surgery. You will need to bring someone with you to drive you home after this test.   · Sigmoidoscopy. This test is similar to colonoscopy, but focuses only on the sigmoid colon and rectum. As with colonoscopy, bowel prep must be done the day before this test. It might not need to be as complete as the bowel prep for a colonoscopy. You are awake during the procedure, but you may be given medicine to help you relax. During the test, the healthcare provider guides a thin, flexible, lighted tube called a sigmoidoscope through your rectum and lower colon. The images are displayed on a video screen. Polyps are removed, if possible, and sent to a lab for testing.  Colonoscopy is the only screening test that lets your healthcare provider see the entire colon and rectum. This test also lets your healthcare provider remove any pieces of tissue that need to be looked at by a lab. If something suspicious is found using any other tests, you will likely need a colonoscopy.     When to call your healthcare provider after a test  Call your healthcare provider if you have any of the following after any screening test:  · Bleeding  · Fever of 100.4°F (38°C) or higher, or as directed by your healthcare provider  · Abdominal pain  · Vomiting   Date Last  Reviewed: 11/4/2015  © 7205-2104 [employer]. [address] [place] [address], [place] [address]. All rights reserved. This information is not intended as a substitute for professional medical care. Always follow your healthcare professional's instructions.        Colonoscopy     A camera attached to a flexible tube with a viewing lens is used to take video pictures.     Colonoscopy is a test to view the inside of your lower digestive tract (colon and rectum). Sometimes it can show the last part of the small intestine (ileum). During the test, small pieces of tissue may be removed for testing. This is called a biopsy. Small growths, such as polyps, may also be removed.   Why is colonoscopy done?  The test is done to help look for colon cancer. And it can help find the source of abdominal pain, bleeding, and changes in bowel habits. It may be needed once a year, depending on factors such as your:  · Age  · Health history  · Family health history  · Symptoms  · Results from any prior colonoscopy  Risks and possible complications  These include:  · Bleeding               · A puncture or tear in the colon   · Risks of anesthesia  · A cancer lesion not being seen  Getting ready   To prepare for the test:  · Talk with your healthcare provider about the risks of the test (see below). Also ask your healthcare provider about alternatives to the test.  · Tell your healthcare provider about any medicines you take. Also tell him or her about any health conditions you may have.  · Make sure your rectum and colon are empty for the test. Follow the diet and bowel prep instructions exactly. If you dont, the test may need to be rescheduled.  · Plan for a friend or family member to drive you home after the test.     Colonoscopy provides an inside view of the entire colon.     You may discuss the results with your doctor right away or at a future visit.  During the test   The test is usually done in the hospital on an outpatient  basis. This means you go home the same day. The procedure takes about 30 minutes. During that time:  · You are given relaxing (sedating) medicine through an IV line. You may be drowsy, or fully asleep.  · The healthcare provider will first give you a physical exam to check for anal and rectal problems.  · Then the anus is lubricated and the scope inserted.  · If you are awake, you may have a feeling similar to needing to have a bowel movement. You may also feel pressure as air is pumped into the colon. Its OK to pass gas during the procedure.  · Biopsy, polyp removal, or other treatments may be done during the test.  After the test   You may have gas right after the test. It can help to try to pass it to help prevent later bloating. Your healthcare provider may discuss the results with you right away. Or you may need to schedule a follow-up visit to talk about the results. After the test, you can go back to your normal eating and other activities. You may be tired from the sedation and need to rest for a few hours.  Date Last Reviewed: 11/1/2016 © 2000-2016 [employer]. [address] [place] [address]. All rights reserved. This information is not intended as a substitute for professional medical care. Always follow your healthcare professional's instructions.        Understanding Colon and Rectal Polyps    The colon (also called the large intestine) is a muscular tube that forms the last part of the digestive tract. It absorbs water and stores food waste. The colon is about 4 to 6 feet long. The rectum is the last 6 inches of the colon. The colon and rectum have a smooth lining composed of millions of cells. Changes in these cells can lead to growths in the colon that can become cancerous and should be removed. Multiple tests are available to screen for colon cancer, but the colonoscopy is the most recommended test. During colonoscopy, these polyps can be removed. How often you need this  test depends on many things including your condition, your family history, symptoms, and what the findings were at the previous colonoscopy.   When the colon lining changes  Changes that happen in the cells that line the colon or rectum can lead to growths called polyps. Over a period of years, polyps can turn cancerous. Removing polyps early may prevent cancer from ever forming.  Polyps  Polyps are fleshy clumps of tissue that form on the lining of the colon or rectum. Small polyps are usually benign (not cancerous). However, over time, cells in a polyp can change and become cancerous. Certain types of polyps known as adenomatous polyps are premalignant. The risk for invasive cancer increases with the size of the polyp and certain cell and gene features. This means that they can become cancerous if they're not removed. Hyperplastic polyps are benign. They can grow quite large and not turn cancerous.   Cancer  Almost all colorectal cancers start when polyp cells begin growing abnormally. As a cancerous tumor grows, it may involve more and more of the colon or rectum. In time, cancer can also grow beyond the colon or rectum and spread to nearby organs or to glands called lymph nodes. The cells can also travel to other parts of the body. This is known as metastasis. The earlier a cancerous tumor is removed, the better the chance of preventing its spread.    Date Last Reviewed: 8/1/2016  © 0066-6678 [employer]. [address] [place] [address], [place] [address]. All rights reserved. This information is not intended as a substitute for professional medical care. Always follow your healthcare professional's instructions.            Admission Information     Date & Time Provider Department [facility]    3/3/2017  6:58 AM Mauri Richardson MD [facility] [id number]      Care Providers     Provider Role Specialty Primary office phone    Mauri Richardson MD Attending Provider Colon and Rectal Surgery [phone]  "   Mauri Richardson MD Surgeon  Colon and Rectal Surgery [phone]      Your Vitals Were     BP Pulse Temp Resp Height Weight    93/53 (BP Location: Left arm, Patient Position: Lying, BP Method: Automatic) 71 98 °F (36.7 °C) (Oral) 16 5' 3" (1.6 m) 65.8 kg (145 lb)    SpO2 BMI             94% 25.69 kg/m2         Recent Lab Values        8/12/2009 5/28/2012 3/1/2013 2/13/2014 11/18/2014 9/29/2015            9:47 AM  2:28 PM  9:47 AM  9:46 AM 12:30 PM  8:16 AM      A1C 6.0 6.2 6.2 6.2 6.0 6.0             Pending Labs     Order Current Status    Specimen to Pathology - Surgery Collected (03/03/17 0743)      Allergies as of 3/3/2017     No Known Allergies      Advance Directives     An advance directive is a document which, in the event you are no longer able to make decisions for yourself, tells your healthcare team what kind of treatment you do or do not want to receive, or who you would like to make those decisions for you.  If you do not currently have an advance directive, [facility] encourages you to create one.  For more information call:  [phone]), [phone] ([phone]),  or log on to [email or web address].        Language Assistance Services     ATTENTION: Language assistance services are available, free of charge. Please call [phone].      ATENCIÓN: Si habla español, tiene a durham disposición servicios gratuitos de asistencia lingüística. Llame al [phone].     [facility] Ý: N?u b?n nói Ti?ng Vi?t, có các d?ch v? h? tr? ngôn ng? mi?n phí dành cho b?n. G?i s? [phone].        Pneumonmia Discharge Instructions                 [facility] complies with applicable Federal civil rights laws and does not discriminate on the basis of race, color, national origin, age, disability, or sex.        "

## 2017-03-03 NOTE — ANESTHESIA POSTPROCEDURE EVALUATION
"Anesthesia Post Evaluation    Patient: Isha Leonard    Procedure(s) Performed: Procedure(s) (LRB):  COLONOSCOPY (N/A)    Final Anesthesia Type: general  Patient location during evaluation: PACU  Patient participation: Yes- Able to Participate  Level of consciousness: awake and alert and oriented  Post-procedure vital signs: reviewed and stable  Pain management: adequate  Airway patency: patent  PONV status at discharge: No PONV  Anesthetic complications: no      Cardiovascular status: hemodynamically stable  Respiratory status: unassisted and spontaneous ventilation  Hydration status: euvolemic  Follow-up not needed.        Visit Vitals    /66 (BP Location: Left arm, Patient Position: Sitting, BP Method: Automatic)    Pulse 67    Temp 36.7 °C (98 °F) (Oral)    Resp 17    Ht 5' 3" (1.6 m)    Wt 65.8 kg (145 lb)    SpO2 97%    BMI 25.69 kg/m2       Pain/Luca Score: Pain Assessment Performed: Yes (3/3/2017  8:10 AM)  Presence of Pain: denies (3/3/2017  7:50 AM)  Pain Rating Prior to Med Admin: 0 (3/3/2017  7:50 AM)  Luca Score: 10 (3/3/2017  8:10 AM)      "

## 2017-03-03 NOTE — ANESTHESIA RELEASE NOTE
"Anesthesia Release from PACU Note    Patient: Isha Leonard    Procedure(s) Performed: Procedure(s) (LRB):  COLONOSCOPY (N/A)    Anesthesia type: general    Post pain: Adequate analgesia    Post assessment: no apparent anesthetic complications    Last Vitals:   Visit Vitals    /66 (BP Location: Left arm, Patient Position: Sitting, BP Method: Automatic)    Pulse 67    Temp 36.7 °C (98 °F) (Oral)    Resp 17    Ht 5' 3" (1.6 m)    Wt 65.8 kg (145 lb)    SpO2 97%    BMI 25.69 kg/m2       Post vital signs: stable    Level of consciousness: awake    Nausea/Vomiting: no nausea/no vomiting    Complications: none    Airway Patency: patent    Respiratory: unassisted    Cardiovascular: stable and blood pressure at baseline    Hydration: euvolemic  "

## 2017-03-03 NOTE — H&P
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  CHEST: Denies CONTRERAS, cyanosis, wheezing, cough and sputum production.  CARDIOVASCULAR: Denies chest pain, PND, orthopnea or reduced exercise tolerance.   Musculoskeletal ROS: negative for - gait disturbance or joint pain  Neurological ROS: negative for - confusion or memory loss        Physical Exam:  General: well developed, well nourished, no distress  Head: normocephalic  Neck: supple, symmetrical, trachea midline  Lungs:  clear to auscultation bilaterally and normal respiratory effort  Heart: regular rate and rhythm, S1, S2 normal, no murmur, rub or gallop and regular rate and rhythm  Abdomen: soft, non-tender non-distented; bowel sounds normal; no masses,  no organomegaly  Extremities: no cyanosis or edema, or clubbing       Moderate Sedation (choice): Mallampati Score 1    ASA : II    IMP: personal history of colon polyps    Plan: Colonoscopy with Moderate sedation.  I have explained the procedure including indications, alternatives, expected outcomes and potential complications. The patient appears to understand and gives informed consent. The patient is medically ready for surgery.

## 2017-03-17 ENCOUNTER — CLINICAL SUPPORT (OUTPATIENT)
Dept: REHABILITATION | Facility: HOSPITAL | Age: 62
End: 2017-03-17
Attending: OBSTETRICS & GYNECOLOGY
Payer: COMMERCIAL

## 2017-03-17 DIAGNOSIS — M62.08 RECTUS DIASTASIS: ICD-10-CM

## 2017-03-17 DIAGNOSIS — N39.3 URINARY, INCONTINENCE, STRESS FEMALE: ICD-10-CM

## 2017-03-17 DIAGNOSIS — R53.1 WEAKNESS: ICD-10-CM

## 2017-03-17 DIAGNOSIS — R10.2 PELVIC PRESSURE IN FEMALE: ICD-10-CM

## 2017-03-17 PROCEDURE — 97110 THERAPEUTIC EXERCISES: CPT | Mod: PO | Performed by: PHYSICAL THERAPIST

## 2017-03-17 NOTE — PROGRESS NOTES
"Patient: Isha Leonard   Clinic #: [id number]   Diagnosis:   Encounter Diagnoses   Name Primary?    Weakness     Rectus diastasis     Urinary, incontinence, stress female     Pelvic pressure in female       Date of start of care: 2/24/17  Date of treatment: 03/17/2017   Time in: 9:00  Time out: 10:00  # Visits: 3/24  Auth: (30) 12/31/17  POC expiration: 5/24/17  Outpatient Physical Therapy   Daily Note    Subjective     Pt stated she the HEP "is easy". Heaviness of abdomen is better but still present. Stress leakage is about the same. Pt states she is most bothered by the extra tissue of her lower abdominal area.     Pain: Current: 0/10     Objective     TREATMENT  Therapeutic Exercise x40 min  Hooklying bilat ER MTB 10x5"  Hooklying add fitness [place] 10x5"  Glut set 10x5"  Front plank on knees and forearms 3x30"    With SEMG:  TA (blow thru straw) + Kegel in supine legs straight 3x10 (holding for 1 breathe)       Did not perform:  TA set with one breath 4x5 each - blow thru straw, fog mirror    Education: Discussed progression of plan of care with patient; educated pt in activity modification; reviewed HEP with pt. Pt demonstrated and verbalized understanding of all instruction and was provided with a handout of HEP (see Patient Instructions). Provided pt with MTB for compliance with progressed HEP. Discussed the cause the extra tissue of the lower abdominal area for the pt as this will only be improved by weight loss and cardio exercising, however we will continue core training to improve pt's DR, stability, and PF complaints    Assessment     Pt tolerated treatment well with no visible adverse affects. No skin irritation, errythemia, or other adverse affects observed from electrode placement. Pt demo'd full understanding of previous HEP independently. With progression of TA + kegel, pt benefitted from verbal cueing throughout today's tx. Pt able to lift PFMs to level 6-10 on [employer] with fair holding and " fair TA contraction. Will continue to progress pt to tolerance to improve strength and coordination in order to improve vaginal pressure, urinary leakage, and abdominal discomfort.     Plan     Continue with established POC, working toward PT goals.

## 2017-03-17 NOTE — PATIENT INSTRUCTIONS
"Home Exercise Program: 03/17/2017    NELL WHILE LAYING DOWN    1. Lay on your back comfortably with your legs flat on the bed.  2. Breathe in AND relax your vaginal muscles.  3. Breathe out like through a straw AND squeeze your vaginal muscles.  4. Repeat 10 times, 6-8 sets per day. Spread throughout the day.      FAST WALKING  30 minutes, x3/week.       FROM [facility]  Front planks on knees and forearms 3x30"  "

## 2017-03-20 ENCOUNTER — CLINICAL SUPPORT (OUTPATIENT)
Dept: REHABILITATION | Facility: HOSPITAL | Age: 62
End: 2017-03-20
Attending: OBSTETRICS & GYNECOLOGY
Payer: COMMERCIAL

## 2017-03-20 DIAGNOSIS — M62.08 RECTUS DIASTASIS: ICD-10-CM

## 2017-03-20 DIAGNOSIS — R10.2 PELVIC PRESSURE IN FEMALE: ICD-10-CM

## 2017-03-20 DIAGNOSIS — R53.1 WEAKNESS: ICD-10-CM

## 2017-03-20 DIAGNOSIS — N39.3 URINARY, INCONTINENCE, STRESS FEMALE: ICD-10-CM

## 2017-03-20 PROCEDURE — 97110 THERAPEUTIC EXERCISES: CPT | Mod: PO | Performed by: PHYSICAL THERAPIST

## 2017-03-20 NOTE — PATIENT INSTRUCTIONS
Home Exercise Program: 03/20/2017    NELL WHILE RECLINED    1. Lay on your back comfortably, propped up by 3-4 pillows, or reclined back on the couch. Let your legs be straight out.  2. Breathe in AND relax your vaginal muscles.  3. Breathe out like through a straw AND squeeze your vaginal muscles.  4. Repeat 10 times, 6-8 sets per day. Spread throughout the day.      FAST WALKING  30 minutes, x3/week.       [facility]  SLR 3x5x5

## 2017-03-20 NOTE — PROGRESS NOTES
"Patient: Isha Leonard   Clinic #: [id number]   Diagnosis:   Encounter Diagnoses   Name Primary?    Weakness     Rectus diastasis     Urinary, incontinence, stress female     Pelvic pressure in female       Date of start of care: 2/24/17  Date of treatment: 03/20/2017   Time in: 9:10  Time out: 9:55  Billable time: 45 min  # Visits: 4/24  Auth: (30) 12/31/17  POC expiration: 5/24/17  Outpatient Physical Therapy   Daily Note    Subjective     Pt stated things are fine. She admits to not doing her HEP "because I'm lazy". No other questions or issues at this time. - however during tx today, pt reports she already did her planks this morning.     Pain: Current: 0/10     Objective     TREATMENT  Therapeutic Exercise x45 min  WITH SEMG:  TA (blow thru straw) + Kegel in supine legs straight 2x9 (holding for 1 breathe)     WITHOUT SEMG:  Hooklying bilat ER MTB 20x5"  Hooklying add fitness [place] 20x5"  Front plank on knees and forearms 3x30"  SLR 3x5x5       Did not perform:  TA set with one breath 4x5 each - blow thru straw, fog mirror  Glut set 10x5" - d/c    Education: Discussed progression of plan of care with patient; educated pt in activity modification; reviewed HEP with pt. Pt demonstrated and verbalized understanding of all instruction and was provided with a handout of HEP (see Patient Instructions). Discussed the importance of doing entire HEP daily.     Assessment     Pt tolerated treatment well with no visible adverse affects with max fatigue with SEMG training. No skin irritation, errythemia, or other adverse affects observed from electrode placement. Pt demo'd full understanding of previous HEP independently. With progression of TA + kegel, pt benefitted from verbal cueing throughout today's tx. Pt demo'd fair holding and fair PFM/TA contraction. She demo'd much better control with PFMs during 2nd set with full drop between each contraction. Pt had frequent reflex tightening of PFMs. Will continue to progress " pt to tolerance to improve strength and coordination in order to improve vaginal pressure, urinary leakage, and abdominal discomfort.     Plan     Continue with established POC, working toward PT goals.

## 2017-03-22 ENCOUNTER — TELEPHONE (OUTPATIENT)
Dept: INTERNAL MEDICINE | Facility: CLINIC | Age: 62
End: 2017-03-22

## 2017-03-22 NOTE — TELEPHONE ENCOUNTER
Spoke to pt and notified that she can go to any pharmacy to have it done   Pt voiced understanding

## 2017-03-22 NOTE — TELEPHONE ENCOUNTER
----- Message from Klarissa Philip sent at 3/22/2017  3:45 PM CDT -----  Contact: Self/[phone]  Pt states that she lost script for TDAP and needs another script written.Please call and advise.

## 2017-03-27 ENCOUNTER — CLINICAL SUPPORT (OUTPATIENT)
Dept: REHABILITATION | Facility: HOSPITAL | Age: 62
End: 2017-03-27
Attending: OBSTETRICS & GYNECOLOGY
Payer: COMMERCIAL

## 2017-03-27 DIAGNOSIS — N39.3 URINARY, INCONTINENCE, STRESS FEMALE: ICD-10-CM

## 2017-03-27 DIAGNOSIS — R10.2 PELVIC PRESSURE IN FEMALE: ICD-10-CM

## 2017-03-27 DIAGNOSIS — R53.1 WEAKNESS: ICD-10-CM

## 2017-03-27 DIAGNOSIS — M62.08 RECTUS DIASTASIS: ICD-10-CM

## 2017-03-27 PROCEDURE — 97110 THERAPEUTIC EXERCISES: CPT | Mod: PO | Performed by: PHYSICAL THERAPIST

## 2017-03-27 NOTE — PROGRESS NOTES
"Patient: Isha Leonard   Clinic #: [id number]   Diagnosis:   Encounter Diagnoses   Name Primary?    Weakness     Rectus diastasis     Urinary, incontinence, stress female     Pelvic pressure in female       Date of start of care: 2/24/17  Date of treatment: 03/27/2017   Time in: 9:00  Time out: 9:45  Billable time: 45 min  # Visits: 5/24  Auth: (30) 12/31/17  POC expiration: 5/24/17  Outpatient Physical Therapy   Daily Note    Subjective     Pt stated she was doing her planks last night and hurt her R arm, so she won't be able to do those today. She says the kegels + TA are going "fine". She thinks her urinary leakage is improving some.      Pain: Current: 0/10     Objective     TREATMENT  Therapeutic Exercise x43 min  WITH SEMG:  TA (blow thru straw) + Kegel in supine legs straight x6 (holding for 1 breathe)  TA (blow thru straw) + Kegel in semi-eugene's legs straight x7 (holding for 1 breathe)   TA (blow thru straw) + Kegel in sitting (feet supported) x14 (holding for 1 breathe)   Standing leaning on table (hands supported) relaxation 2'    Neuromuscular Reeducation x2 min  Tandem stance 1x30" bilat    Did not perform:  Hooklying bilat ER MTB 20x5"  Hooklying add fitness [place] 20x5"  Front plank on knees and forearms 3x30"  SLR 3x5x5       Education: Discussed progression of plan of care with patient; educated pt in activity modification; reviewed HEP with pt. Pt demonstrated and verbalized understanding of all instruction and was provided with a handout of HEP (see Patient Instructions). Discussed the importance of doing entire HEP daily.     Assessment     Pt tolerated treatment well with no visible adverse affects with sub-max fatigue with SEMG training. No skin irritation, errythemia, or other adverse affects observed from electrode placement. Pt demo'd slightly unstable relaxation with all supported position exercises. Pt continues to improve in PFM and TA strength. Pt able to contract PFM alone with good " "holding, however unable to contract TA voluntarily without prompt to "blow through straw" and therefore has limited holding ability. Pt had difficulty remaining still during relaxation and difficult to conclude reflexive tightening vs extraneous movement. Again, encourage pt to add cardiovascular walking for fat loss of abdomen. Will continue to progress pt to tolerance to improve strength and coordination in order to improve vaginal pressure, urinary leakage, and abdominal discomfort.     Plan     Continue with established POC, working toward PT goals.        "

## 2017-04-03 ENCOUNTER — CLINICAL SUPPORT (OUTPATIENT)
Dept: REHABILITATION | Facility: HOSPITAL | Age: 62
End: 2017-04-03
Attending: OBSTETRICS & GYNECOLOGY
Payer: COMMERCIAL

## 2017-04-03 DIAGNOSIS — N39.3 URINARY, INCONTINENCE, STRESS FEMALE: ICD-10-CM

## 2017-04-03 DIAGNOSIS — R53.1 WEAKNESS: ICD-10-CM

## 2017-04-03 DIAGNOSIS — M62.08 RECTUS DIASTASIS: ICD-10-CM

## 2017-04-03 DIAGNOSIS — R10.2 PELVIC PRESSURE IN FEMALE: ICD-10-CM

## 2017-04-03 PROCEDURE — 97110 THERAPEUTIC EXERCISES: CPT | Mod: PO | Performed by: PHYSICAL THERAPIST

## 2017-04-03 NOTE — PROGRESS NOTES
"Patient: Isha Leonard   Clinic #: [id number]   Diagnosis:   Encounter Diagnoses   Name Primary?    Weakness     Rectus diastasis     Urinary, incontinence, stress female     Pelvic pressure in female       Date of start of care: 2/24/17  Date of treatment: 04/03/2017   Time in: 8:00  Time out: 8:50  Billable time: 50 min  # Visits: 5/24  Auth: (30) 12/31/17  POC expiration: 5/24/17  Outpatient Physical Therapy   Daily Note    Subjective     Pt reports things are "getting much better". She reports that the heaviness of her tummy and weakness of gluts and legs is still bothersome. She reports doing her exercises "a little" this week. Pt denies pain.    Pain: Current: 0/10     Objective     TREATMENT  Therapeutic Exercise x50 min  WITH SEMG:  TA (blow thru straw) + Kegel in semi-eugene's legs straight x11 (holding for 1 breathe)   TA (blow thru straw) + Kegel in sitting (feet supported) x4 (holding for 1 breathe),   TA + Kegel in sitting (feet supported) 10"W/10"R x10 (not timed with breathing)  TA + Kegel in standing leaning on table 10"W/10"R x10 (not timed with breathing)    Did not perform:  Hooklying bilat ER MTB 20x5"  Hooklying add fitness [place] 20x5"  Front plank on knees and forearms 3x30"  SLR 3x5x5   Tandem stance 1x30" bilat    Education: Discussed progression of plan of care with patient; educated pt in activity modification; reviewed HEP with pt. Pt demonstrated and verbalized understanding of all instruction and was not provided with a handout of HEP (see Patient Instructions). Discussed the importance of doing entire HEP daily. Again, reinforced that the only way to decrease the weight of her tummy is with cardiovascular exercise (ie. Fast walking) regularly.     Assessment     Pt tolerated treatment well with no visible adverse affects with max fatigue with SEMG training. No skin irritation, errythemia, or other adverse affects observed from electrode placement. Full, stable resting baseline in all " positions today without difficulty. In semi-eugene's pt demo'd good WR rise and holding with both TA and kegel, timed with breathing. In sitting, pt demo'd good WR rise, holding, and control when untimed with breathing pattern. In standing, pt demo'd fair WR rise and fair holding with kegel, and poor WR rise and good holding with TA when not timed with kegel. Again, encourage pt to add cardiovascular walking for fat loss of abdomen. Will continue to progress pt to tolerance to improve strength and coordination in order to improve vaginal pressure, urinary leakage, and abdominal discomfort. Pt has met both STGs. All LTGs are still appropriate.      GOALS  Short Term Goals: 1 month  1. Pt will verbalize improved awareness of PFM activity as palpated by PT in order to improve activity involvement with HEP.  2. Pt will tolerate HEP to improve impairments and independence with ADL's.  ALL GOALS MET 4/3/17    Long Term Goals: 3 months  1. Pt will report <5% on PFDI-20 to demonstrate a decrease in disability and improvement in independence with functional activities.   2. Patient able to perform basic ADL with less leaking.,   3. Pelvic floor muscle contraction long enough to inhibit bladder contraction.,   4. Able to maintain normal breathing pattern during pelvic floor muscle contraction.,   5. Pt to report longer periods of standing activity without vaginal pressure.,   6. Pt to be I with self mgmt. of symptoms.   7. Pt to be I with HEP.     Plan     Continue with established POC, working toward PT goals. To see pt in 3 weeks to increase dependency on HEP.

## 2017-04-05 ENCOUNTER — TELEPHONE (OUTPATIENT)
Dept: INTERNAL MEDICINE | Facility: CLINIC | Age: 62
End: 2017-04-05

## 2017-04-05 NOTE — TELEPHONE ENCOUNTER
----- Message from Whitney Presley sent at 4/5/2017  9:28 AM CDT -----  Contact: self/[phone]  Pt called in regards to getting a appointment to get her kidneys check. She did not want the first available and did not want to see anyone else.      Please advise

## 2017-04-10 ENCOUNTER — OFFICE VISIT (OUTPATIENT)
Dept: INTERNAL MEDICINE | Facility: CLINIC | Age: 62
End: 2017-04-10
Payer: COMMERCIAL

## 2017-04-10 VITALS
WEIGHT: 147.25 LBS | OXYGEN SATURATION: 98 % | HEIGHT: 63 IN | HEART RATE: 81 BPM | DIASTOLIC BLOOD PRESSURE: 84 MMHG | BODY MASS INDEX: 26.09 KG/M2 | SYSTOLIC BLOOD PRESSURE: 110 MMHG | TEMPERATURE: 98 F

## 2017-04-10 DIAGNOSIS — R10.9 ABDOMINAL PAIN, OTHER SPECIFIED SITE: Primary | ICD-10-CM

## 2017-04-10 DIAGNOSIS — D12.6 COLON ADENOMA: ICD-10-CM

## 2017-04-10 DIAGNOSIS — K76.0 FATTY LIVER: ICD-10-CM

## 2017-04-10 DIAGNOSIS — E78.01 FAMILIAL HYPERCHOLESTEROLEMIA: ICD-10-CM

## 2017-04-10 DIAGNOSIS — K80.20 GALLSTONES: ICD-10-CM

## 2017-04-10 DIAGNOSIS — R73.02 GLUCOSE INTOLERANCE (IMPAIRED GLUCOSE TOLERANCE): ICD-10-CM

## 2017-04-10 DIAGNOSIS — E03.9 ACQUIRED HYPOTHYROIDISM: ICD-10-CM

## 2017-04-10 PROBLEM — R53.1 WEAKNESS: Status: RESOLVED | Noted: 2017-02-24 | Resolved: 2017-04-10

## 2017-04-10 PROBLEM — Z13.9 SCREENING: Status: RESOLVED | Noted: 2017-03-03 | Resolved: 2017-04-10

## 2017-04-10 PROCEDURE — 99999 PR PBB SHADOW E&M-EST. PATIENT-LVL III: CPT | Mod: PBBFAC,,, | Performed by: INTERNAL MEDICINE

## 2017-04-10 PROCEDURE — 99214 OFFICE O/P EST MOD 30 MIN: CPT | Mod: S$GLB,,, | Performed by: INTERNAL MEDICINE

## 2017-04-10 PROCEDURE — 1160F RVW MEDS BY RX/DR IN RCRD: CPT | Mod: S$GLB,,, | Performed by: INTERNAL MEDICINE

## 2017-04-10 NOTE — MR AVS SNAPSHOT
[facility]  [address]  [facility] [address]  Phone: [phone]  Fax: [phone]                  Isha Leonard   4/10/2017 10:00 AM   Office Visit    Description:  Female : 1955   Provider:  Gwen Duarte MD   Department:  [facility]           Reason for Visit     Abdominal Pain           Diagnoses this Visit        Comments    Abdominal pain, other specified site    -  Primary     Acquired hypothyroidism         Familial hypercholesterolemia         Fatty liver         Gallstones         Glucose intolerance (impaired glucose tolerance)         Colon adenoma                To Do List           Future Appointments        Provider Department Dept Phone    2017 8:00 AM Amparo Givens PT [facility] [phone]    5/10/2017 9:00 AM Ese Salazar MD [facility] [phone]      Goals (5 Years of Data)     None      [facility]     [facility] Nurse Care [facility] -  Assistance  Unless otherwise directed by your provider, please contact [facility], our nurse care line that is available for  assistance.     Registered nurses in the [facility] provide: appointment scheduling, clinical advisement, health education, and other advisory services.  Call: [phone] (toll free)               Medications           Message regarding Medications     Verify the changes and/or additions to your medication regime listed below are the same as discussed with your clinician today.  If any of these changes or additions are incorrect, please notify your healthcare provider.             Verify that the below list of medications is an accurate representation of the medications you are currently taking.  If none reported, the list may be blank. If incorrect, please contact your healthcare provider. Carry this list with you in case of emergency.           Current Medications     levothyroxine  "(SYNTHROID) 100 MCG tablet Take 1 tablet (100 mcg total) by mouth once daily.    loratadine (CLARITIN) 10 mg tablet Take 10 mg by mouth once daily.    sodium chloride (SALINE NASAL) 0.65 % nasal spray 1 spray by Nasal route as needed for Congestion.           Clinical Reference Information           Your Vitals Were     BP Pulse Temp Height Weight SpO2    110/84 (BP Location: Right arm, Patient Position: Sitting) 81 98.3 °F (36.8 °C) 5' 3" (1.6 m) 66.8 kg (147 lb 4.3 oz) 98%    BMI                26.09 kg/m2          Blood Pressure          Most Recent Value    BP  110/84      Allergies as of 4/10/2017     No Known Allergies      Immunizations Administered on Date of Encounter - 4/10/2017     None      Orders Placed During Today's Visit     Future Labs/Procedures Expected by Expires    CBC auto differential  4/10/2017 4/10/2018    Comprehensive metabolic panel  4/10/2017 4/10/2018    Hemoglobin A1c  4/10/2017 4/10/2018    Lipid panel  4/10/2017 4/10/2018    TSH  4/10/2017 4/10/2018      Instructions      Possible Gallstone with Biliary Colic (Presumed)    Your abdominal pain is may be due to spasm of the gallbladder. This is called gallbladder or biliary colic. The gallbladder is a small sac under the liver, which stores and releases bile. Bile is a fluid that aids in the digestion of fat. Eating fatty food stimulates the gallbladder to contract, and release the bile. A gallstone may form in this sac. Although most people do not have symptoms, when the stone moves and blocks the passage of bile out of the bladder, it can cause pain and even an infection.  To be more certain of the diagnosis, you may need to have an ultrasound, CT-scan or other special test.  A number of things increase the risk for developing gallstones:  · Women are more likely  · Obesity  · Increasing age  · Losing or gaining weight quickly  · High calorie diet  · Pregnancy  · Hormone therapy  · Diabetes  The most common symptoms are:  · Abdominal " pain, cramping, aching  · Nausea, vomiting  · Fever  Many illnesses can cause these symptoms. This pain usually starts in the upper right side of your abdomen. Sometimes it can radiate to your right shoulder, back and arm. It usually starts suddenly, becomes more intense quickly, and then gradually decreases and disappears over a couple of hours. Elderly people and diabetics may have difficulty showing where the pain is exactly.  Home care  · Rest in bed and follow a clear liquid diet until feeling better. If pain or nausea medicine was given to help with your symptoms, take these as directed.  · You can take acetaminophen or ibuprofen for pain, unless you were given a different pain medicine to use.Note: If you have chronic liver or kidney disease or ever had a stomach ulcer or GI bleeding or are taking blood thinner medications, talk with your doctor before using these medicines.  · Fat in your diet makes the gallbladder contract and may cause increased pain. Therefore, avoid fat in your diet over the next two days and follow a low-fat diet after that. If you are overweight, a low fat diet will help you lose weight.  Follow-up care  If a test was already scheduled for you, keep this appointment. Be sure you know how to prepare yourself for the test. Usually, you will be asked not to eat or drink anything for at least 8 hours before the test. Schedule an appointment with your own doctor after your test is complete to discuss the findings.  When to seek medical advice  Call your healthcare provider if any of the following occur:  · Pain gets worse or moves to the right lower abdomen  · Repeated vomiting  · Swelling of the abdomen  · Pain lasts over 6 hours  · Fever of 100.4º F (38º C) or higher, or as directed by your healthcare provider  · Weakness, dizziness  · Dark urine or light colored stools  · Yellow color of the skin or eyes  · Chest, arm, back, neck or jaw pain  Call 911  Get emergency medical care right  away if any of these occur:  · Trouble breathing  · Very confused  · Very drowsy or trouble awakening  · Fainting or loss of consciousness  · Rapid heart rate  Date Last Reviewed: 8/23/2015 © 2000-2016 [employer]. [address] [place] [address], [place] [address]. All rights reserved. This information is not intended as a substitute for professional medical care. Always follow your healthcare professional's instructions.        Treating Gallstones    The gallbladder is an organ that stores bile. This is a substance that helps with digestion. Deposits in bile can clump together, creating hard, pebble-like stones. These gallstones may not cause any symptoms. In most cases, gallstones have no symptoms. In some cases, though, they irritate the walls of the gallbladder. Or they can block flow of bile out of the gallbladder. If they fall into the common bile duct, stones can block the flow of bile into the small bowel. This can lead to jaundice, pain, or serious infection. Stones are treated only if you have symptoms. If treatment is needed, your healthcare provider will discuss your choices with you. The most common treatments are listed below.  Medicine  Medicines to dissolve gallstones don't really work.   ERCP  ERCP (endoscopic retrograde cholangiopancreatography) is an outpatient procedure that needs heavy sedation to remove stones. It uses a thin tube with video and X-rays to locate stones blocking the flow of bile. The stones are then removed. ERCP may be done alone. Or it may be used before surgery is done to remove the gallbladder.  Surgery  A cholecystectomy is an operation to remove the gallbladder and its contents (gallstones). Today, most cholecystectomies are done laparoscopically. This means using several very small abdominal incisions. Cholecystectomy may also be done with the traditional single, larger incision.   Prevent future symptoms  After treatment, you should follow a low-fat diet. This  diet includes lean meats, lean poultry, and fish. Avoid full-fat dairy products. And limit vegetable oils. Read food labels to be sure theyre low in fat.   Date Last Reviewed: 5/1/2016 © 2000-2016 [employer]. [address] [place] [address], [place] [address]. All rights reserved. This information is not intended as a substitute for professional medical care. Always follow your healthcare professional's instructions.        What are Gallstones?    The gallbladder stores bile, a fluid made by the liver. Bile helps digest fats in the foods you eat. Gallstones form when certain substances in the bile crystallize and become solid. In some cases, the stones dont cause any symptoms. In others, they irritate the walls of the gallbladder. More serious problems can occur if stones move into nearby ducts--such as the common bile duct--and cause blockages. This can block the flow of bile and can lead to pain, nausea, and infection.  Common symptoms  Gallbladder problems can cause painful attacks, often after a meal. Some people have only one attack. Others have many. Common symptoms include:  · Severe, steady pain or aching in the upper right abdomen, back, or right shoulder blade, lasting from 30 minutes to several hours  · A dull ache beneath the ribs or breastbone  · Nausea, upset stomach, or vomiting  · Jaundice (a buildup of bile chemicals in the blood), which causes yellowing of the skin and eyes, dark urine, and itching. Call your doctor immediately if this system develops.  Treating gallstones  If your stones are not causing symptoms, you may choose to delay treatment. But if youve had one or more painful attacks, your doctor will likely recommend removing your gallbladder. This prevents more stones from forming and causing attacks. It also helps prevent complications, such as stones passing into the ducts and causing infection or pancreatitis. After the gallbladder is removed, your liver will still make bile  to aid digestion.     If youre pregnant  Hormone changes during pregnancy can make bile more likely to form stones. If your gallbladder needs to be removed, your doctor will talk with you about the timing for surgery. In some cases, it can be delayed until after childbirth. In others, you may have surgery during pregnancy. This helps protect you and your babys health.   Date Last Reviewed: 3/15/2014  © 0132-8634 [employer]. [address] [place] [address], [place] [address]. All rights reserved. This information is not intended as a substitute for professional medical care. Always follow your healthcare professional's instructions.             Language Assistance Services     ATTENTION: Language assistance services are available, free of charge. Please call [phone].      ATENCIÓN: Si arline sauceda, tiene a durham disposición servicios gratuitos de asistencia lingüística. Llame al [phone].     ELMIRA Ý: N?u b?n nói Ti?ng Vi?t, có các d?ch v? h? tr? ngôn ng? mi?n phí dành cho b?n. G?i s? [phone].         [facility] complies with applicable Federal civil rights laws and does not discriminate on the basis of race, color, national origin, age, disability, or sex.

## 2017-04-10 NOTE — PROGRESS NOTES
Subjective:       Patient ID: Isha Leonard is a 61 y.o. female.    Chief Complaint: Abdominal Pain    HPI Comments: Multiple issues.    After eating, has a sensation of bloating and fullness in the right upper quadrant.    We had ordered an abdominal CT scan which for some reason was not completed.  She does have gallstones and fatty liver and this was reviewed.  She's had no vomiting, fever, chills, back pain or early satiety.  She's had no changes in bladder or bowels.    Natural history of gallbladder disease reviewed.    Patient Active Problem List:     Familial hypercholesterolemia     Acquired hypothyroidism     AR (allergic rhinitis)     Glucose intolerance (impaired glucose tolerance)     Gallstones: see ultrasound 2014     Colon adenoma: 2011- also 3/17     Fatty liver: see u/s 2014     Midline cystocele     Rectus diastasis     Urinary, incontinence, stress female     Pelvic pressure in female      Abdominal Pain   Pertinent negatives include no arthralgias, constipation, diarrhea, fever, frequency or hematuria.     Review of Systems   Constitutional: Negative for chills, fatigue and fever.   HENT: Negative for congestion, ear pain and postnasal drip.    Eyes: Negative.    Respiratory: Negative for cough, chest tightness, shortness of breath and wheezing.    Cardiovascular: Negative.    Gastrointestinal: Positive for abdominal pain. Negative for abdominal distention, blood in stool, constipation and diarrhea.   Genitourinary: Negative for frequency and hematuria.   Musculoskeletal: Negative for arthralgias and back pain.       Objective:      Physical Exam   Constitutional: She is oriented to person, place, and time. She appears well-developed and well-nourished.   HENT:   Head: Normocephalic and atraumatic.   Right Ear: External ear normal.   Left Ear: External ear normal.   Nose: Nose normal.   Mouth/Throat: Oropharynx is clear and moist. No oropharyngeal exudate.   Eyes: Conjunctivae and EOM are  normal. No scleral icterus.   Neck: Normal range of motion. Neck supple. No JVD present. No thyromegaly present.   Cardiovascular: Normal rate, regular rhythm, normal heart sounds and intact distal pulses.  Exam reveals no gallop.    No murmur heard.  Pulmonary/Chest: Effort normal and breath sounds normal. No respiratory distress. She has no wheezes. She exhibits no tenderness.   Abdominal: Soft. Bowel sounds are normal. She exhibits no distension and no mass. There is no tenderness. There is no rebound and no guarding.   Musculoskeletal: Normal range of motion. She exhibits no edema or tenderness.   Lymphadenopathy:     She has no cervical adenopathy.   Neurological: She is alert and oriented to person, place, and time. She displays normal reflexes. No cranial nerve deficit. Coordination normal.   Skin: Skin is warm. No rash noted. No erythema.   Psychiatric: She has a normal mood and affect. Her behavior is normal. Judgment and thought content normal.   Nursing note and vitals reviewed.      Assessment:       1. Abdominal pain, other specified site    2. Acquired hypothyroidism    3. Familial hypercholesterolemia    4. Fatty liver: see u/s 2014    5. Gallstones: see ultrasound 2014    6. Glucose intolerance (impaired glucose tolerance)    7. Colon adenoma: 2011- also 3/17        Plan:         Abdominal pain, other specified site  -     Amylase; Future; Expected date: 4/10/17  -     Magnesium; Future; Expected date: 4/10/17  -     Vitamin B12; Future; Expected date: 4/10/17  -     Vitamin D; Future; Expected date: 4/10/17  -     Lipase; Future; Expected date: 4/10/17    Acquired hypothyroidism  -     TSH; Future; Expected date: 4/10/17    Familial hypercholesterolemia  -     Lipid panel; Future; Expected date: 4/10/17    Fatty liver: see u/s 2014  -     Comprehensive metabolic panel; Future; Expected date: 4/10/17    Gallstones: see ultrasound 2014  -     CBC auto differential; Future; Expected date:  4/10/17    Glucose intolerance (impaired glucose tolerance)  -     Hemoglobin A1c; Future; Expected date: 4/10/17    Colon adenoma: 2011- also 3/17: no alarm sx.  Repeat in 5 years    Coleman diet  GB cautions reviewed    CT scan ordered- to be scheduled    I will review all studies and determine further tx depending on findings

## 2017-04-10 NOTE — PATIENT INSTRUCTIONS
Possible Gallstone with Biliary Colic (Presumed)    Your abdominal pain is may be due to spasm of the gallbladder. This is called gallbladder or biliary colic. The gallbladder is a small sac under the liver, which stores and releases bile. Bile is a fluid that aids in the digestion of fat. Eating fatty food stimulates the gallbladder to contract, and release the bile. A gallstone may form in this sac. Although most people do not have symptoms, when the stone moves and blocks the passage of bile out of the bladder, it can cause pain and even an infection.  To be more certain of the diagnosis, you may need to have an ultrasound, CT-scan or other special test.  A number of things increase the risk for developing gallstones:  · Women are more likely  · Obesity  · Increasing age  · Losing or gaining weight quickly  · High calorie diet  · Pregnancy  · Hormone therapy  · Diabetes  The most common symptoms are:  · Abdominal pain, cramping, aching  · Nausea, vomiting  · Fever  Many illnesses can cause these symptoms. This pain usually starts in the upper right side of your abdomen. Sometimes it can radiate to your right shoulder, back and arm. It usually starts suddenly, becomes more intense quickly, and then gradually decreases and disappears over a couple of hours. Elderly people and diabetics may have difficulty showing where the pain is exactly.  Home care  · Rest in bed and follow a clear liquid diet until feeling better. If pain or nausea medicine was given to help with your symptoms, take these as directed.  · You can take acetaminophen or ibuprofen for pain, unless you were given a different pain medicine to use.Note: If you have chronic liver or kidney disease or ever had a stomach ulcer or GI bleeding or are taking blood thinner medications, talk with your doctor before using these medicines.  · Fat in your diet makes the gallbladder contract and may cause increased pain. Therefore, avoid fat in your diet over  the next two days and follow a low-fat diet after that. If you are overweight, a low fat diet will help you lose weight.  Follow-up care  If a test was already scheduled for you, keep this appointment. Be sure you know how to prepare yourself for the test. Usually, you will be asked not to eat or drink anything for at least 8 hours before the test. Schedule an appointment with your own doctor after your test is complete to discuss the findings.  When to seek medical advice  Call your healthcare provider if any of the following occur:  · Pain gets worse or moves to the right lower abdomen  · Repeated vomiting  · Swelling of the abdomen  · Pain lasts over 6 hours  · Fever of 100.4º F (38º C) or higher, or as directed by your healthcare provider  · Weakness, dizziness  · Dark urine or light colored stools  · Yellow color of the skin or eyes  · Chest, arm, back, neck or jaw pain  Call 911  Get emergency medical care right away if any of these occur:  · Trouble breathing  · Very confused  · Very drowsy or trouble awakening  · Fainting or loss of consciousness  · Rapid heart rate  Date Last Reviewed: 8/23/2015  © 5232-0973 [employer]. [address] [place] [address], [place] [address]. All rights reserved. This information is not intended as a substitute for professional medical care. Always follow your healthcare professional's instructions.        Treating Gallstones    The gallbladder is an organ that stores bile. This is a substance that helps with digestion. Deposits in bile can clump together, creating hard, pebble-like stones. These gallstones may not cause any symptoms. In most cases, gallstones have no symptoms. In some cases, though, they irritate the walls of the gallbladder. Or they can block flow of bile out of the gallbladder. If they fall into the common bile duct, stones can block the flow of bile into the small bowel. This can lead to jaundice, pain, or serious infection. Stones are treated only if  you have symptoms. If treatment is needed, your healthcare provider will discuss your choices with you. The most common treatments are listed below.  Medicine  Medicines to dissolve gallstones don't really work.   ERCP  ERCP (endoscopic retrograde cholangiopancreatography) is an outpatient procedure that needs heavy sedation to remove stones. It uses a thin tube with video and X-rays to locate stones blocking the flow of bile. The stones are then removed. ERCP may be done alone. Or it may be used before surgery is done to remove the gallbladder.  Surgery  A cholecystectomy is an operation to remove the gallbladder and its contents (gallstones). Today, most cholecystectomies are done laparoscopically. This means using several very small abdominal incisions. Cholecystectomy may also be done with the traditional single, larger incision.   Prevent future symptoms  After treatment, you should follow a low-fat diet. This diet includes lean meats, lean poultry, and fish. Avoid full-fat dairy products. And limit vegetable oils. Read food labels to be sure theyre low in fat.   Date Last Reviewed: 5/1/2016 © 2000-2016 [employer]. [address] [place] [address]. All rights reserved. This information is not intended as a substitute for professional medical care. Always follow your healthcare professional's instructions.        What are Gallstones?    The gallbladder stores bile, a fluid made by the liver. Bile helps digest fats in the foods you eat. Gallstones form when certain substances in the bile crystallize and become solid. In some cases, the stones dont cause any symptoms. In others, they irritate the walls of the gallbladder. More serious problems can occur if stones move into nearby ducts--such as the common bile duct--and cause blockages. This can block the flow of bile and can lead to pain, nausea, and infection.  Common symptoms  Gallbladder problems can cause painful attacks, often  after a meal. Some people have only one attack. Others have many. Common symptoms include:  · Severe, steady pain or aching in the upper right abdomen, back, or right shoulder blade, lasting from 30 minutes to several hours  · A dull ache beneath the ribs or breastbone  · Nausea, upset stomach, or vomiting  · Jaundice (a buildup of bile chemicals in the blood), which causes yellowing of the skin and eyes, dark urine, and itching. Call your doctor immediately if this system develops.  Treating gallstones  If your stones are not causing symptoms, you may choose to delay treatment. But if youve had one or more painful attacks, your doctor will likely recommend removing your gallbladder. This prevents more stones from forming and causing attacks. It also helps prevent complications, such as stones passing into the ducts and causing infection or pancreatitis. After the gallbladder is removed, your liver will still make bile to aid digestion.     If youre pregnant  Hormone changes during pregnancy can make bile more likely to form stones. If your gallbladder needs to be removed, your doctor will talk with you about the timing for surgery. In some cases, it can be delayed until after childbirth. In others, you may have surgery during pregnancy. This helps protect you and your babys health.   Date Last Reviewed: 3/15/2014  © 9928-9008 [employer]. [address] [place] [address], [place] [address]. All rights reserved. This information is not intended as a substitute for professional medical care. Always follow your healthcare professional's instructions.

## 2017-04-17 ENCOUNTER — LAB VISIT (OUTPATIENT)
Dept: LAB | Facility: HOSPITAL | Age: 62
End: 2017-04-17
Attending: INTERNAL MEDICINE
Payer: COMMERCIAL

## 2017-04-17 DIAGNOSIS — K76.0 FATTY LIVER: ICD-10-CM

## 2017-04-17 DIAGNOSIS — K80.20 GALLSTONES: ICD-10-CM

## 2017-04-17 DIAGNOSIS — E78.01 FAMILIAL HYPERCHOLESTEROLEMIA: ICD-10-CM

## 2017-04-17 DIAGNOSIS — R10.9 ABDOMINAL PAIN, OTHER SPECIFIED SITE: ICD-10-CM

## 2017-04-17 DIAGNOSIS — E03.9 ACQUIRED HYPOTHYROIDISM: ICD-10-CM

## 2017-04-17 DIAGNOSIS — R73.02 GLUCOSE INTOLERANCE (IMPAIRED GLUCOSE TOLERANCE): ICD-10-CM

## 2017-04-17 LAB
25(OH)D3+25(OH)D2 SERPL-MCNC: 32 NG/ML
ALBUMIN SERPL BCP-MCNC: 3.8 G/DL
ALP SERPL-CCNC: 75 U/L
ALT SERPL W/O P-5'-P-CCNC: 29 U/L
AMYLASE SERPL-CCNC: 47 U/L
ANION GAP SERPL CALC-SCNC: 9 MMOL/L
AST SERPL-CCNC: 24 U/L
BASOPHILS # BLD AUTO: 0.02 K/UL
BASOPHILS NFR BLD: 0.3 %
BILIRUB SERPL-MCNC: 1 MG/DL
BUN SERPL-MCNC: 14 MG/DL
CALCIUM SERPL-MCNC: 9.2 MG/DL
CHLORIDE SERPL-SCNC: 105 MMOL/L
CHOLEST/HDLC SERPL: 4.1 {RATIO}
CO2 SERPL-SCNC: 27 MMOL/L
CREAT SERPL-MCNC: 0.7 MG/DL
DIFFERENTIAL METHOD: NORMAL
EOSINOPHIL # BLD AUTO: 0.1 K/UL
EOSINOPHIL NFR BLD: 2.1 %
ERYTHROCYTE [DISTWIDTH] IN BLOOD BY AUTOMATED COUNT: 12.8 %
EST. GFR  (AFRICAN AMERICAN): >60 ML/MIN/1.73 M^2
EST. GFR  (NON AFRICAN AMERICAN): >60 ML/MIN/1.73 M^2
GLUCOSE SERPL-MCNC: 95 MG/DL
HCT VFR BLD AUTO: 43.3 %
HDL/CHOLESTEROL RATIO: 24.3 %
HDLC SERPL-MCNC: 214 MG/DL
HDLC SERPL-MCNC: 52 MG/DL
HGB BLD-MCNC: 14.1 G/DL
LDLC SERPL CALC-MCNC: 124 MG/DL
LIPASE SERPL-CCNC: 28 U/L
LYMPHOCYTES # BLD AUTO: 2.5 K/UL
LYMPHOCYTES NFR BLD: 43.5 %
MAGNESIUM SERPL-MCNC: 2.3 MG/DL
MCH RBC QN AUTO: 28 PG
MCHC RBC AUTO-ENTMCNC: 32.6 %
MCV RBC AUTO: 86 FL
MONOCYTES # BLD AUTO: 0.3 K/UL
MONOCYTES NFR BLD: 4.7 %
NEUTROPHILS # BLD AUTO: 2.9 K/UL
NEUTROPHILS NFR BLD: 49.2 %
NONHDLC SERPL-MCNC: 162 MG/DL
PLATELET # BLD AUTO: 345 K/UL
PMV BLD AUTO: 9.8 FL
POTASSIUM SERPL-SCNC: 4.2 MMOL/L
PROT SERPL-MCNC: 7.4 G/DL
RBC # BLD AUTO: 5.03 M/UL
SODIUM SERPL-SCNC: 141 MMOL/L
TRIGL SERPL-MCNC: 190 MG/DL
TSH SERPL DL<=0.005 MIU/L-ACNC: 0.85 UIU/ML
VIT B12 SERPL-MCNC: 477 PG/ML
WBC # BLD AUTO: 5.79 K/UL

## 2017-04-17 PROCEDURE — 80053 COMPREHEN METABOLIC PANEL: CPT

## 2017-04-17 PROCEDURE — 83036 HEMOGLOBIN GLYCOSYLATED A1C: CPT

## 2017-04-17 PROCEDURE — 84443 ASSAY THYROID STIM HORMONE: CPT

## 2017-04-17 PROCEDURE — 36415 COLL VENOUS BLD VENIPUNCTURE: CPT

## 2017-04-17 PROCEDURE — 82306 VITAMIN D 25 HYDROXY: CPT

## 2017-04-17 PROCEDURE — 83735 ASSAY OF MAGNESIUM: CPT

## 2017-04-17 PROCEDURE — 82150 ASSAY OF AMYLASE: CPT

## 2017-04-17 PROCEDURE — 82607 VITAMIN B-12: CPT

## 2017-04-17 PROCEDURE — 83690 ASSAY OF LIPASE: CPT

## 2017-04-17 PROCEDURE — 80061 LIPID PANEL: CPT

## 2017-04-17 PROCEDURE — 85025 COMPLETE CBC W/AUTO DIFF WBC: CPT

## 2017-04-18 ENCOUNTER — PATIENT MESSAGE (OUTPATIENT)
Dept: INTERNAL MEDICINE | Facility: CLINIC | Age: 62
End: 2017-04-18

## 2017-04-18 LAB
ESTIMATED AVG GLUCOSE: 128 MG/DL
HBA1C MFR BLD HPLC: 6.1 %

## 2017-04-27 ENCOUNTER — TELEPHONE (OUTPATIENT)
Dept: UROGYNECOLOGY | Facility: CLINIC | Age: 62
End: 2017-04-27

## 2017-04-27 NOTE — TELEPHONE ENCOUNTER
Pt was advised Dr. Salazar don't see pt's on Mon or fri because those are surgery days. She verbalized understanding call ended.

## 2017-04-27 NOTE — TELEPHONE ENCOUNTER
----- Message from Jose R Downey sent at 4/27/2017  9:22 AM CDT -----  Contact: Pt  X_ 1st Request  _ 2nd Request  _ 3rd Request    Who:MALLORY LING [[id number]]    Why: Patient states she would like to speak with staff in regards to getting a Monday or Friday appointment instead    What Number to Call Back: [phone]    When to Expect a call back: (Before the end of the day)  -- if call after 3:00 call back will be tomorrow.

## 2017-04-28 ENCOUNTER — TELEPHONE (OUTPATIENT)
Dept: RADIOLOGY | Facility: HOSPITAL | Age: 62
End: 2017-04-28

## 2017-05-03 ENCOUNTER — HOSPITAL ENCOUNTER (OUTPATIENT)
Dept: RADIOLOGY | Facility: HOSPITAL | Age: 62
Discharge: HOME | End: 2017-05-03
Attending: INTERNAL MEDICINE
Payer: COMMERCIAL

## 2017-05-03 PROCEDURE — 74178 CT ABD&PLV WO CNTR FLWD CNTR: CPT | Mod: TC

## 2017-05-03 PROCEDURE — 74178 CT ABD&PLV WO CNTR FLWD CNTR: CPT | Mod: 26,,, | Performed by: RADIOLOGY

## 2017-05-03 PROCEDURE — 25500020 PHARM REV CODE 255: Performed by: INTERNAL MEDICINE

## 2017-05-03 RX ADMIN — IOHEXOL 15 ML: 350 INJECTION, SOLUTION INTRAVENOUS at 03:05

## 2017-05-03 RX ADMIN — IOHEXOL 75 ML: 350 INJECTION, SOLUTION INTRAVENOUS at 05:05

## 2017-05-03 RX ADMIN — IOHEXOL 15 ML: 350 INJECTION, SOLUTION INTRAVENOUS at 02:05

## 2017-05-05 ENCOUNTER — PATIENT MESSAGE (OUTPATIENT)
Dept: INTERNAL MEDICINE | Facility: CLINIC | Age: 62
End: 2017-05-05

## 2017-05-05 PROBLEM — K57.30 DIVERTICULOSIS OF LARGE INTESTINE WITHOUT HEMORRHAGE: Status: ACTIVE | Noted: 2017-05-05

## 2017-05-05 PROBLEM — N28.1 BILATERAL RENAL CYSTS: Status: ACTIVE | Noted: 2017-05-05

## 2017-05-10 ENCOUNTER — OFFICE VISIT (OUTPATIENT)
Dept: UROGYNECOLOGY | Facility: CLINIC | Age: 62
End: 2017-05-10
Payer: COMMERCIAL

## 2017-05-10 VITALS
WEIGHT: 148.81 LBS | HEIGHT: 63 IN | DIASTOLIC BLOOD PRESSURE: 80 MMHG | SYSTOLIC BLOOD PRESSURE: 120 MMHG | BODY MASS INDEX: 26.37 KG/M2

## 2017-05-10 DIAGNOSIS — M62.08 RECTUS DIASTASIS: Chronic | ICD-10-CM

## 2017-05-10 DIAGNOSIS — K57.30 DIVERTICULOSIS OF LARGE INTESTINE WITHOUT HEMORRHAGE: ICD-10-CM

## 2017-05-10 DIAGNOSIS — N39.3 URINARY, INCONTINENCE, STRESS FEMALE: ICD-10-CM

## 2017-05-10 DIAGNOSIS — N81.11 MIDLINE CYSTOCELE: ICD-10-CM

## 2017-05-10 DIAGNOSIS — R10.2 PELVIC PRESSURE IN FEMALE: ICD-10-CM

## 2017-05-10 DIAGNOSIS — N28.1 BILATERAL RENAL CYSTS: ICD-10-CM

## 2017-05-10 DIAGNOSIS — N81.2 UTEROVAGINAL PROLAPSE, INCOMPLETE: Primary | ICD-10-CM

## 2017-05-10 PROCEDURE — 99213 OFFICE O/P EST LOW 20 MIN: CPT | Mod: S$GLB,,, | Performed by: OBSTETRICS & GYNECOLOGY

## 2017-05-10 PROCEDURE — 99999 PR PBB SHADOW E&M-EST. PATIENT-LVL III: CPT | Mod: PBBFAC,,, | Performed by: OBSTETRICS & GYNECOLOGY

## 2017-05-10 PROCEDURE — 1160F RVW MEDS BY RX/DR IN RCRD: CPT | Mod: S$GLB,,, | Performed by: OBSTETRICS & GYNECOLOGY

## 2017-05-10 NOTE — PROGRESS NOTES
Urogyn follow up    [facility]  [address] [address]    Isha Leonard  [id number]  1955      Isha Leonard is a 61 y.o. here for a urogyn follow up.    1)  UI:  (+) HUMZA.  (--) UUI  X 10years.  (+) pads:2/day, usually minimum wetness.  Daytime frequency: Q 2 hours.  Nocturia: No: 2/night.   (+) dysuria,  (--) hematuria,  (--) frequent UTIs.  (--) complete bladder emptying. Has to lean forward.    2)  POP:  Present. above introitus.  Symptoms:(+)  pressure.  (--) vaginal bleeding. (--) vaginal discharge. (+) sexually active.  (--) dyspareunia.  (--)  Vaginal dryness.  (--) vaginal estrogen use.     3)  BM:  (--) constipation/straining.  (--) chronic diarrhea. (--) hematochezia.  (--) fecal incontinence.  (--) fecal smearing/urgency.  (--) incomplete evacuation.     Initial exam:  PELVIC:    External genitalia:  Normal Bartholins, Skenes and labia bilaterally.    Urethra:  No caruncle, diverticulum or masses.  (--) hypermobility.    Vagina:  Atrophy (--) , no bladder masses or tender, no discharge.    Cervix:  normal appearance  Uterus: enlarged, 12 weeks size. Mobile, NT.    Adnexa: Not palpable.    POP-Q:  Aa 0; Ba 0; C -5; Ap -1; Bp -1; D -7.  Genital hiatus 3, perineal body 2 total vaginal length 11-12 (standing).        Past Medical History  Past Medical History:   Diagnosis Date    Anemia     AR (allergic rhinitis) 2/28/2013    Bilateral renal cysts: see CT scan May 2017 5/5/2017    Colon adenoma: 2011- repeat colonoscopy 12/16 7/19/2016    Colon polyp     Diverticulosis of large intestine without hemorrhage: see CT scan May 2017 5/5/2017    Fatty liver     GERD (gastroesophageal reflux disease)     Glucose intolerance (impaired glucose tolerance)     Hyperlipidemia     Hypertension     Pneumonia     Thyroid disease     Thyroid nodule: s/p R thyroidectomy; L side wnl 2014 2/17/2014    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      Past Surgical History  Past Surgical  History:   Procedure Laterality Date    BREAST BIOPSY      Left, benign    COLONOSCOPY N/A 3/3/2017    Procedure: COLONOSCOPY;  Surgeon: Mauri Richardson MD;  Location: [facility] ([address] [place] [address]);  Service: Endoscopy;  Laterality: N/A;  pt req Dr Richardson    COLONOSCOPY W/ POLYPECTOMY      THYROIDECTOMY, PARTIAL      secondary to thyroid nodule      Hysterectomy: No  Cervix present/absent: Yes (2016- negative)  Ovaries present/absent: Yes    Past Ob History     x 4.      Largest infant weight: 8lb 2oz  yes FAVD. unknown episiotomy.      Gynecologic History  LMP: No LMP recorded. Patient is postmenopausal.  Age of menarche: 14  Age of menopause: 54  Menstrual history: Regular  Last pap: 2016- negative  Last mammogram: 2016- negative  Colonoscopy: Planned for march  DEXA: Osteopenia - Non-compliant with calcium/vit D    Issues include:  Patient Active Problem List   Diagnosis    Familial hypercholesterolemia    Acquired hypothyroidism    AR (allergic rhinitis)    Glucose intolerance (impaired glucose tolerance)    Gallstones: see ultrasound     Colon adenoma: - also 3/17    Fatty liver: see u/s     Midline cystocele    Rectus diastasis    Urinary, incontinence, stress female    Pelvic pressure in female    Diverticulosis of large intestine without hemorrhage: see CT scan May 2017    Bilateral renal cysts: see CT scan May 2017       History since last visit:   1)  Stage 2 cystocele, stage 1 uterovaginal prolapse, stage 2 rectocele in setting of rectus diastasis:  --s/p PT with [facility]--feels that pressure is better, not perfect  --only went x 1 month    2)  Female stress incontinence:  --only with coughing -- same amount    3)  Well-woman:  Last pap: 2016- negative  Last mammogram: 2016- negative  Colonoscopy: , benign polyp; repeat .    DEXA:  Osteopenia - Non-compliant with calcium/vit D.  Repeat -.      Healthy bones:   --Take 600 mg calcium every AM and 600 mg  "calcium every PM (for total of 1200 mg daily).  Take 400 IU vitamin D in AM and 400 IU vitamin D in the PM (for total of 800 IU daily).    --Start weight bearing exercises in improve bone density:  This type of exercise forces you to work against gravity. Some examples of weight-bearing exercises include weight training, walking, hiking, jogging, climbing stairs, tennis, and dancing. Examples of exercises that are not weight-bearing include swimming and bicycling. Although these activities help build and maintain strong muscles and have excellent cardiovascular benefits, they are not the best way to exercise your bones.  --Follow up for repeat DEXA (bone density testing) as scheduled.     4)  RTC 2-3 months.      Medications:    Current Outpatient Prescriptions:     levothyroxine (SYNTHROID) 100 MCG tablet, Take 1 tablet (100 mcg total) by mouth once daily., Disp: 90 tablet, Rfl: 3    loratadine (CLARITIN) 10 mg tablet, Take 10 mg by mouth once daily., Disp: , Rfl:     sodium chloride (SALINE NASAL) 0.65 % nasal spray, 1 spray by Nasal route as needed for Congestion., Disp: , Rfl:     Prior studies or therapies include:   CT A/P 5/3/17:  1.  Lobulated uterus with dystrophic calcifications, likely representing uterine fibroids.  2.  Cholelithiasis.  3.  Bilateral tiny renal hypodensities, too small to characterize possibly cysts.  4.  Colonic diverticulosis.  5.  Degenerative changes of the lumbar spine with bilateral facet arthropathy at L4-5 and L5-S1 and grade I anterolisthesis L4-5.    ROS:  As per HPI.      Exam  /80 (BP Location: Left arm, Patient Position: Sitting)  Ht 5' 3" (1.6 m)  Wt 67.5 kg (148 lb 13 oz)  BMI 26.36 kg/m2  General: alert and oriented, no acute distress  Remainder of PE deferred.     Impression  1. Uterovaginal prolapse, incomplete  Ambulatory consult to Physical Therapy   2. Urinary, incontinence, stress female     3. Pelvic pressure in female     4. Midline cystocele     5. " Bilateral renal cysts: see CT scan May 2017     6. Diverticulosis of large intestine without hemorrhage: see CT scan May 2017     7. Rectus diastasis       We reviewed the above issues and discussed options for short-term versus long-term management of her problems.   Plan:   1)  Stage 2 cystocele, stage 1 uterovaginal prolapse, stage 2 rectocele in setting of rectus diastasis:  --very mild, not past introitus; main complaint is vaginal pressure and abdominal laxity/pressure   --start core exercises (see sheets); consider joining gym   --restart PT in [place] (more con:  Ana Fields. [facility] in [place], [place]:  (p) [phone].  (f) [phone]. Call in a few days to make appointment.    --could consider surgery to reapproximate diastasis in future; may or may not need POP surgery (very mild)    2)  Female stress incontinence:  --continue PT  --Empty bladder every 3 hours.  Empty well: wait a minute, lean forward on toilet.    --Avoid dietary irritants (see sheet).  Keep diary x 3-5 days to determine your irritants.  --KEGELS: do 10 in AM and 10 in PM, holding each x 10 seconds.  When you feel urge to go, STOP, KEGEL, and when urge has passed, then go to bathroom.  Consider PT in future.    --STRESS:  Pessary vs. Sling.  Consider sling if needs other (diastasis surgery)    3)  Well-woman:  Last pap: 8/2016- negative  Last mammogram: 7/2016- negative  Colonoscopy: 2017, benign polyp; repeat 2022.    DEXA: 2015 Osteopenia - Non-compliant with calcium/vit D.  Repeat 2017-19.      --Diverticulosis (small pockets on bowel)  on CT scan:  Start daily fiber.  Take 1 tsp of fiber powder (psyllium or other sugar-free powder).  Mix in 8 oz of water.  Take x 3-5 days.  Then, increase fiber by 1 tsp every 3-5 days until stool is easy to pass, swelling/hemorrhoids are better.  Stop and continue at that dose.   Do not exceed 6 tsps/day.  May also use over the counter stool softener 1-2 x/day.  AVOID  laxatives.    Healthy bones:   --Take 600 mg calcium every AM and 600 mg calcium every PM (for total of 1200 mg daily).  Take 400 IU vitamin D in AM and 400 IU vitamin D in the PM (for total of 800 IU daily).    --Start weight bearing exercises in improve bone density:  This type of exercise forces you to work against gravity. Some examples of weight-bearing exercises include weight training, walking, hiking, jogging, climbing stairs, tennis, and dancing. Examples of exercises that are not weight-bearing include swimming and bicycling. Although these activities help build and maintain strong muscles and have excellent cardiovascular benefits, they are not the best way to exercise your bones.  --Follow up for repeat DEXA (bone density testing) as scheduled.     4)  RTC 3 months.      30 minutes were spent in face to face time with this patient  90 % of this time was spent in counseling and/or coordination of care  ME@  [facility]  Division of Female Pelvic Medicine and Reconstructive Surgery  Department of Obstetrics & Gynecology

## 2017-05-10 NOTE — MR AVS SNAPSHOT
"    [facility]  [address] [address]  Phone: [phone]                  Isha Leonard   5/10/2017 9:00 AM   Office Visit    Description:  Female : 1955   Provider:  Ese Salazar MD   Department:  [facility]           Reason for Visit     Urinary Incontinence     PELVIC PRESSURE           Diagnoses this Visit        Comments    Uterovaginal prolapse, incomplete    -  Primary            To Do List           Goals (5 Years of Data)     None      [facility]     [facility] -  Assistance  Unless otherwise directed by your provider, please contact [facility], our nurse care line that is available for  assistance.     Registered nurses in the [facility] provide: appointment scheduling, clinical advisement, health education, and other advisory services.  Call: [phone] (toll free)               Medications           Message regarding Medications     Verify the changes and/or additions to your medication regime listed below are the same as discussed with your clinician today.  If any of these changes or additions are incorrect, please notify your healthcare provider.             Verify that the below list of medications is an accurate representation of the medications you are currently taking.  If none reported, the list may be blank. If incorrect, please contact your healthcare provider. Carry this list with you in case of emergency.           Current Medications     levothyroxine (SYNTHROID) 100 MCG tablet Take 1 tablet (100 mcg total) by mouth once daily.    loratadine (CLARITIN) 10 mg tablet Take 10 mg by mouth once daily.    sodium chloride (SALINE NASAL) 0.65 % nasal spray 1 spray by Nasal route as needed for Congestion.           Clinical Reference Information           Your Vitals Were     BP Height Weight BMI       120/80 (BP Location: Left arm, Patient Position: Sitting) 5' 3" (1.6 m) " 67.5 kg (148 lb 13 oz) 26.36 kg/m2       Blood Pressure          Most Recent Value    BP  120/80      Allergies as of 5/10/2017     No Known Allergies      Immunizations Administered on Date of Encounter - 5/10/2017     None      Orders Placed During Today's Visit      Normal Orders This Visit    Ambulatory consult to Physical Therapy       Instructions      Diverticulosis    Diverticulosis means that small pouches have formed in the wall of your large intestine (colon). Most often, this problem causes no symptoms and is common as people age. But the pouches in the colon are at risk of becoming infected. When this happens, the condition is called diverticulitis. Although most people with diverticulosis never develop diverticulitis, it is still not uncommon. Rectal bleeding can also occur and in less common situations, a type of colon inflammation called colitis.  While most people do not have symptoms, some people with diverticulosis may have:  · Abdominal cramps and pain  · Bloating  · Constipation  · Change in bowel habits  Causes  The exact cause of diverticulosis (and diverticulitis) has not been proved, but a few things are associated with the condition:  · Low-fiber diet  · Constipation  · Lack of exercise  Your healthcare provider will talk with you about how to manage your condition. Diet changes may be all that are needed to help control diverticulosis and prevent progression to diverticulitis. If you develop diverticulitis, you will likely need other treatments.  Home care  You may be told to take fiber supplements daily. Fiber adds bulk to the stool so that it passes through the colon more easily. Stool softeners may be recommended. You may also be given medications for pain relief. Be sure to take all medications as directed.  In the past, people were told to avoid corn, nuts, and seeds. This is no longer necessary.  Follow these guidelines when caring for yourself at home:  · Eat unprocessed foods that  are high in fiber. Whole grains, fruits, and vegetables are good choices.  · Drink 6 to 8 glasses of water every day unless your healthcare provider has you limit how much fluid you should have.  · Watch for changes in your bowel movements. Tell your provider if you notice any changes.  · Begin an exercise program. Ask your provider how to get started. Generally, walking is the best.  · Get plenty of rest and sleep.  Follow-up care  Follow up with your healthcare provider, or as advised. Regular visits may be needed to check on your health. Sometimes special procedures such as colonoscopy, are needed after an episode of diverticulitis or blooding. Be sure to keep all your appointments.  If a stool sample was taken, or cultures were done, you should be told if they are positive, or if your treatment needs to be changed. You can call as directed for the results.  If X-rays were done, a radiologist will look at them. You will be told if there is a change in your treatment.  If antibiotics were prescribed, be sure to finish them all.  When to seek medical advice  Call your healthcare provider right away if any of these occur:  · Fever of 100.4°F (38°C) or higher, or as directed by your healthcare provider  · Severe cramps in the lower left side of the abdomen or pain that is getting worse  · Tenderness in the lower left side of the abdomen or worsening pain throughout the abdomen  · Diarrhea or constipation that doesn't get better within 24 hours  · Nausea and vomiting  · Bleeding from the rectum  Call 911  Call emergency services if any of the following occur:  · Trouble breathing  · Confusion  · Very drowsy or trouble awakening  · Fainting or loss of consciousness  · Rapid heart rate  · Chest pain  Date Last Reviewed: 12/30/2015  © 8081-1343 [employer]. [address] [place] [address], [place] [address]. All rights reserved. This information is not intended as a substitute for professional medical care. Always  follow your healthcare professional's instructions.  ----------------------------------------------------------------------------    1)  Stage 2 cystocele, stage 1 uterovaginal prolapse, stage 2 rectocele in setting of rectus diastasis:  --very mild, not past introitus; main complaint is vaginal pressure and abdominal laxity/pressure   --start core exercises (see sheets); consider joining gym   --restart PT in [place] (more con:  Ana Fields. [facility] in [place], [place]:  p) [phone].  (f) [phone]. Call in a few days to make appointment.    --could consider surgery to reapproximate diastasis in future; may or may not need POP surgery (very mild)    2)  Female stress incontinence:  --continue PT  --Empty bladder every 3 hours.  Empty well: wait a minute, lean forward on toilet.    --Avoid dietary irritants (see sheet).  Keep diary x 3-5 days to determine your irritants.  --KEGELS: do 10 in AM and 10 in PM, holding each x 10 seconds.  When you feel urge to go, STOP, KEGEL, and when urge has passed, then go to bathroom.  Consider PT in future.    --STRESS:  Pessary vs. Sling.  Consider sling if needs other (diastasis surgery)    3)  Well-woman:  Last pap: 8/2016- negative  Last mammogram: 7/2016- negative  Colonoscopy: 2017, benign polyp; repeat 2022.    DEXA: 2015 Osteopenia - Non-compliant with calcium/vit D.  Repeat 2017-19.      --Diverticulosis (small pockets on bowel)  on CT scan:  Start daily fiber.  Take 1 tsp of fiber powder (psyllium or other sugar-free powder).  Mix in 8 oz of water.  Take x 3-5 days.  Then, increase fiber by 1 tsp every 3-5 days until stool is easy to pass, swelling/hemorrhoids are better.  Stop and continue at that dose.   Do not exceed 6 tsps/day.  May also use over the counter stool softener 1-2 x/day.  AVOID laxatives.    Healthy bones:   --Take 600 mg calcium every AM and 600 mg calcium every PM (for total of 1200 mg daily).  Take 400 IU vitamin D in AM and 400 IU  vitamin D in the PM (for total of 800 IU daily).    --Start weight bearing exercises in improve bone density:  This type of exercise forces you to work against gravity. Some examples of weight-bearing exercises include weight training, walking, hiking, jogging, climbing stairs, tennis, and dancing. Examples of exercises that are not weight-bearing include swimming and bicycling. Although these activities help build and maintain strong muscles and have excellent cardiovascular benefits, they are not the best way to exercise your bones.  --Follow up for repeat DEXA (bone density testing) as scheduled.     4)  RTC 3 months.                Language Assistance Services     ATTENTION: Language assistance services are available, free of charge. Please call [phone].      ATENCIÓN: Si arline sauceda, tiene a durham disposición servicios gratuitos de asistencia lingüística. Llame al [phone].     CHÚ Ý: N?u b?n nói Ti?ng Vi?t, có các d?ch v? h? tr? ngôn ng? mi?n phí dành cho b?n. G?i s? [phone].         [facility] complies with applicable Federal civil rights laws and does not discriminate on the basis of race, color, national origin, age, disability, or sex.

## 2017-05-10 NOTE — PATIENT INSTRUCTIONS
Diverticulosis    Diverticulosis means that small pouches have formed in the wall of your large intestine (colon). Most often, this problem causes no symptoms and is common as people age. But the pouches in the colon are at risk of becoming infected. When this happens, the condition is called diverticulitis. Although most people with diverticulosis never develop diverticulitis, it is still not uncommon. Rectal bleeding can also occur and in less common situations, a type of colon inflammation called colitis.  While most people do not have symptoms, some people with diverticulosis may have:  · Abdominal cramps and pain  · Bloating  · Constipation  · Change in bowel habits  Causes  The exact cause of diverticulosis (and diverticulitis) has not been proved, but a few things are associated with the condition:  · Low-fiber diet  · Constipation  · Lack of exercise  Your healthcare provider will talk with you about how to manage your condition. Diet changes may be all that are needed to help control diverticulosis and prevent progression to diverticulitis. If you develop diverticulitis, you will likely need other treatments.  Home care  You may be told to take fiber supplements daily. Fiber adds bulk to the stool so that it passes through the colon more easily. Stool softeners may be recommended. You may also be given medications for pain relief. Be sure to take all medications as directed.  In the past, people were told to avoid corn, nuts, and seeds. This is no longer necessary.  Follow these guidelines when caring for yourself at home:  · Eat unprocessed foods that are high in fiber. Whole grains, fruits, and vegetables are good choices.  · Drink 6 to 8 glasses of water every day unless your healthcare provider has you limit how much fluid you should have.  · Watch for changes in your bowel movements. Tell your provider if you notice any changes.  · Begin an exercise program. Ask your provider how to get started.  Generally, walking is the best.  · Get plenty of rest and sleep.  Follow-up care  Follow up with your healthcare provider, or as advised. Regular visits may be needed to check on your health. Sometimes special procedures such as colonoscopy, are needed after an episode of diverticulitis or blooding. Be sure to keep all your appointments.  If a stool sample was taken, or cultures were done, you should be told if they are positive, or if your treatment needs to be changed. You can call as directed for the results.  If X-rays were done, a radiologist will look at them. You will be told if there is a change in your treatment.  If antibiotics were prescribed, be sure to finish them all.  When to seek medical advice  Call your healthcare provider right away if any of these occur:  · Fever of 100.4°F (38°C) or higher, or as directed by your healthcare provider  · Severe cramps in the lower left side of the abdomen or pain that is getting worse  · Tenderness in the lower left side of the abdomen or worsening pain throughout the abdomen  · Diarrhea or constipation that doesn't get better within 24 hours  · Nausea and vomiting  · Bleeding from the rectum  Call 911  Call emergency services if any of the following occur:  · Trouble breathing  · Confusion  · Very drowsy or trouble awakening  · Fainting or loss of consciousness  · Rapid heart rate  · Chest pain  Date Last Reviewed: 12/30/2015  © 3210-5095 [employer]. [address] [place] [address]. All rights reserved. This information is not intended as a substitute for professional medical care. Always follow your healthcare professional's instructions.  ----------------------------------------------------------------------------    1)  Stage 2 cystocele, stage 1 uterovaginal prolapse, stage 2 rectocele in setting of rectus diastasis:  --very mild, not past introitus; main complaint is vaginal pressure and abdominal laxity/pressure   --start core  exercises (see sheets); consider joining gym   --restart PT in [place] (more con:  Ana Fields. [facility] in [place], [place]:  (p) [phone].  (f) [phone]. Call in a few days to make appointment.    --could consider surgery to reapproximate diastasis in future; may or may not need POP surgery (very mild)    2)  Female stress incontinence:  --continue PT  --Empty bladder every 3 hours.  Empty well: wait a minute, lean forward on toilet.    --Avoid dietary irritants (see sheet).  Keep diary x 3-5 days to determine your irritants.  --KEGELS: do 10 in AM and 10 in PM, holding each x 10 seconds.  When you feel urge to go, STOP, KEGEL, and when urge has passed, then go to bathroom.  Consider PT in future.    --STRESS:  Pessary vs. Sling.  Consider sling if needs other (diastasis surgery)    3)  Well-woman:  Last pap: 8/2016- negative  Last mammogram: 7/2016- negative  Colonoscopy: 2017, benign polyp; repeat 2022.    DEXA: 2015 Osteopenia - Non-compliant with calcium/vit D.  Repeat 2017-19.      --Diverticulosis (small pockets on bowel)  on CT scan:  Start daily fiber.  Take 1 tsp of fiber powder (psyllium or other sugar-free powder).  Mix in 8 oz of water.  Take x 3-5 days.  Then, increase fiber by 1 tsp every 3-5 days until stool is easy to pass, swelling/hemorrhoids are better.  Stop and continue at that dose.   Do not exceed 6 tsps/day.  May also use over the counter stool softener 1-2 x/day.  AVOID laxatives.    Healthy bones:   --Take 600 mg calcium every AM and 600 mg calcium every PM (for total of 1200 mg daily).  Take 400 IU vitamin D in AM and 400 IU vitamin D in the PM (for total of 800 IU daily).    --Start weight bearing exercises in improve bone density:  This type of exercise forces you to work against gravity. Some examples of weight-bearing exercises include weight training, walking, hiking, jogging, climbing stairs, tennis, and dancing. Examples of exercises that are not weight-bearing  include swimming and bicycling. Although these activities help build and maintain strong muscles and have excellent cardiovascular benefits, they are not the best way to exercise your bones.  --Follow up for repeat DEXA (bone density testing) as scheduled.     4)  RTC 3 months.

## 2017-05-12 ENCOUNTER — TELEPHONE (OUTPATIENT)
Dept: UROGYNECOLOGY | Facility: CLINIC | Age: 62
End: 2017-05-12

## 2017-05-12 NOTE — TELEPHONE ENCOUNTER
----- Message from Ese Salazar MD sent at 5/11/2017  7:23 PM CDT -----  Please fill out signed Rx:    Dx:  Female stress incontinence, pelvic organ prolapse, pelvic pressure  Rx:  Pelvic floor muscle strengthening (mostly to help with incontinence and pelvic pressure sensation)    Please fax with copy of last clinic note & demographic information to:        Ana Fields. [facility] in [place], [place]:  (p) [phone].  (f) [phone].     Thanks!

## 2017-05-12 NOTE — TELEPHONE ENCOUNTER
Filled out and faxed an Rx for along with her last clinic note to Ana Fields for Female stress incontinence, pelvic organ prolapse, pelvic pressure, & Pelvic floor muscle strengthening (mostly to help with incontinence and pelvic pressure sensation).

## 2017-05-12 NOTE — TELEPHONE ENCOUNTER
----- Message from Ese Salazar MD sent at 5/11/2017  7:23 PM CDT -----  Please fill out signed Rx:    Dx:  Female stress incontinence, pelvic organ prolapse, pelvic pressure  Rx:  Pelvic floor muscle strengthening (mostly to help with incontinence and pelvic pressure sensation)    Please fax with copy of last clinic note & demographic information to:        Ana Fields. [facility] in [place], [place]:  (p) [phone].  (f) [phone].     Thanks!

## 2017-05-31 ENCOUNTER — TELEPHONE (OUTPATIENT)
Dept: UROGYNECOLOGY | Facility: CLINIC | Age: 62
End: 2017-05-31

## 2017-05-31 NOTE — TELEPHONE ENCOUNTER
----- Message from Hair Fried sent at 5/31/2017 12:34 PM CDT -----  Contact: Patient  x_ 1st Request  _ 2nd Request  _ 3rd Request    Who: Patient    Why: Patient is calling in regards to needing a referral to see a physical therapist. Patient is needing a call back.    What Number to Call Back: [phone]    When to Expect a call back: (Before the end of the day)  -- if call after 3:00 call back will be tomorrow.

## 2017-07-26 ENCOUNTER — DOCUMENTATION ONLY (OUTPATIENT)
Dept: REHABILITATION | Facility: HOSPITAL | Age: 62
End: 2017-07-26

## 2017-07-26 NOTE — PROGRESS NOTES
PHYSICAL THERAPY DISCHARGE SUMMARY     Name: Isha Leonard  Clinic Number: [id number]    Physician: Ese Salazar  Diagnosis: pelvic pressure in female, DR HUMZA    Treatment ordered: Physical Therapy    Medical History:   Past Medical History:   Diagnosis Date    Anemia     AR (allergic rhinitis) 2/28/2013    Bilateral renal cysts: see CT scan May 2017 5/5/2017    Colon adenoma: 2011- repeat colonoscopy 12/16 7/19/2016    Colon polyp     Diverticulosis of large intestine without hemorrhage: see CT scan May 2017 5/5/2017    Fatty liver     GERD (gastroesophageal reflux disease)     Glucose intolerance (impaired glucose tolerance)     Hyperlipidemia     Hypertension     Pneumonia     Thyroid disease     Thyroid nodule: s/p R thyroidectomy; L side wnl 2014 2/17/2014    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        Surgical History:   Past Surgical History:   Procedure Laterality Date    BREAST BIOPSY  2008    Left, benign    COLONOSCOPY N/A 3/3/2017    Procedure: COLONOSCOPY;  Surgeon: Muari Richardson MD;  Location: [facility] ([address] [place] [address]);  Service: Endoscopy;  Laterality: N/A;  pt req Dr Richardson    COLONOSCOPY W/ POLYPECTOMY      THYROIDECTOMY, PARTIAL      secondary to thyroid nodule        Medications:   Current Outpatient Prescriptions   Medication Sig    levothyroxine (SYNTHROID) 100 MCG tablet Take 1 tablet (100 mcg total) by mouth once daily.    loratadine (CLARITIN) 10 mg tablet Take 10 mg by mouth once daily.    sodium chloride (SALINE NASAL) 0.65 % nasal spray 1 spray by Nasal route as needed for Congestion.     No current facility-administered medications for this visit.        Allergies: Review of patient's allergies indicates:  No Known Allergies     Initial Evaluation: 2/24/17  Date of Last visit: 4/3/17  Date of Discharge Note: 07/26/2017  Attended Visits: 5  Missed Visits: 7    ASSESSMENT     GOALS  Short Term Goals: 1 month  1. Pt will verbalize improved awareness of PFM activity as palpated by PT in  order to improve activity involvement with HEP.  2. Pt will tolerate HEP to improve impairments and independence with ADL's.  ALL GOALS MET 4/3/17     Long Term Goals: 3 months  1. Pt will report <5% on PFDI-20 to demonstrate a decrease in disability and improvement in independence with functional activities.   2. Patient able to perform basic ADL with less leaking.,   3. Pelvic floor muscle contraction long enough to inhibit bladder contraction.,   4. Able to maintain normal breathing pattern during pelvic floor muscle contraction.,   5. Pt to report longer periods of standing activity without vaginal pressure.,   6. Pt to be I with self mgmt. of symptoms.   7. Pt to be I with HEP.    Status towards goals: Pt was making good progress towards PT goals.    Discharge reason: Patient self discharge, Pt has not re-scheduled further follow-up sessions and POC has     G-Code: Discharge G-code not filed due to discharge note being documentation only.     PLAN     This patient is discharged from Physical Therapy Services.

## 2017-09-01 ENCOUNTER — TELEPHONE (OUTPATIENT)
Dept: UROGYNECOLOGY | Facility: CLINIC | Age: 62
End: 2017-09-01

## 2017-09-05 ENCOUNTER — TELEPHONE (OUTPATIENT)
Dept: UROGYNECOLOGY | Facility: CLINIC | Age: 62
End: 2017-09-05

## 2017-09-05 NOTE — TELEPHONE ENCOUNTER
----- Message from Hair Fried sent at 9/5/2017 11:24 AM CDT -----  Contact: pt  x_ 1st Request  _ 2nd Request  _ 3rd Request    Who: pt    Why: is needing to reschedule her appointment    What Number to Call Back: [phone]    When to Expect a call back: (Before the end of the day)  -- if call after 3:00 call back will be tomorrow.

## 2017-09-26 ENCOUNTER — TELEPHONE (OUTPATIENT)
Dept: UROGYNECOLOGY | Facility: CLINIC | Age: 62
End: 2017-09-26

## 2017-09-26 NOTE — TELEPHONE ENCOUNTER
Left voice message for pt to give the office a call back at [phone] to reschedule her appointment on 10/05.

## 2017-09-28 ENCOUNTER — TELEPHONE (OUTPATIENT)
Dept: UROGYNECOLOGY | Facility: CLINIC | Age: 62
End: 2017-09-28

## 2017-09-28 NOTE — TELEPHONE ENCOUNTER
Left a detailed VM informing the pt as of today her appointment on 10/05 has been canceled and she can give the office a call back to raman @ [phone].

## 2017-11-05 ENCOUNTER — OFFICE VISIT (OUTPATIENT)
Dept: FAMILY MEDICINE | Facility: CLINIC | Age: 62
End: 2017-11-05
Payer: COMMERCIAL

## 2017-11-05 VITALS
BODY MASS INDEX: 27.71 KG/M2 | HEART RATE: 83 BPM | TEMPERATURE: 98 F | SYSTOLIC BLOOD PRESSURE: 116 MMHG | WEIGHT: 141.13 LBS | OXYGEN SATURATION: 96 % | DIASTOLIC BLOOD PRESSURE: 70 MMHG | HEIGHT: 60 IN

## 2017-11-05 DIAGNOSIS — J41.0 SIMPLE CHRONIC BRONCHITIS: Primary | ICD-10-CM

## 2017-11-05 PROCEDURE — 99214 OFFICE O/P EST MOD 30 MIN: CPT | Mod: S$GLB,,, | Performed by: FAMILY MEDICINE

## 2017-11-05 PROCEDURE — 99999 PR PBB SHADOW E&M-EST. PATIENT-LVL III: CPT | Mod: PBBFAC,,,

## 2017-11-05 RX ORDER — AMOXICILLIN 875 MG/1
875 TABLET, FILM COATED ORAL EVERY 12 HOURS
Qty: 20 TABLET | Refills: 0 | Status: SHIPPED | OUTPATIENT
Start: 2017-11-05 | End: 2018-03-15

## 2017-11-05 NOTE — PROGRESS NOTES
Routine Office Visit    Patient Name: Isha Leonard    : 1955  MRN: [id number]    Subjective:  Isha is a 62 y.o. female who presents today for:    1. Cough and sore throat  Patient presenting with her  today with several days of cough (non-productive) and sore throat.  She denies any post nasal drip.  There has been no fever, chills, or rash.  She states that it started while she was in [place] visiting and has not gotten any better.  No OTC medication have been taken.      Past Medical History  Past Medical History:   Diagnosis Date    Anemia     AR (allergic rhinitis) 2013    Bilateral renal cysts: see CT scan May 2017 2017    Colon adenoma: - repeat colonoscopy 2016    Colon polyp     Diverticulosis of large intestine without hemorrhage: see CT scan May 2017 2017    Fatty liver     GERD (gastroesophageal reflux disease)     Glucose intolerance (impaired glucose tolerance)     Hyperlipidemia     Hypertension     Pneumonia     Thyroid disease     Thyroid nodule: s/p R thyroidectomy; L side wnl 2014    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       Past Surgical History  Past Surgical History:   Procedure Laterality Date    BREAST BIOPSY      Left, benign    COLONOSCOPY N/A 3/3/2017    Procedure: COLONOSCOPY;  Surgeon: Mauri Richardson MD;  Location: [facility] ([address] [place] [address]);  Service: Endoscopy;  Laterality: N/A;  pt req Dr Richardson    COLONOSCOPY W/ POLYPECTOMY      THYROIDECTOMY, PARTIAL      secondary to thyroid nodule       Family History  Family History   Problem Relation Age of Onset    Osteoporosis Mother     Diabetes Mother     Hypertension Mother     Heart disease Mother     Cancer Father      Bladder cancer    Diabetes Sister     Breast cancer Neg Hx     Colon cancer Neg Hx     Eclampsia Neg Hx     Miscarriages / Stillbirths Neg Hx     Ovarian cancer Neg Hx      labor Neg Hx     Stroke Neg Hx     Cervical cancer Neg Hx     Endometrial cancer  Neg Hx     Vaginal cancer Neg Hx        Social History  Social History     Social History    Marital status:      Spouse name: N/A    Number of children: 3    Years of education: N/A     Occupational History    Teacher      Social History Main Topics    Smoking status: Never Smoker    Smokeless tobacco: Never Used    Alcohol use No    Drug use: No    Sexual activity: Yes     Partners: Male     Birth control/ protection: Post-menopausal     Other Topics Concern    Are You Pregnant Or Think You May Be? No    Breast-Feeding No     Social History Narrative    No narrative on file       Current Medications  Current Outpatient Prescriptions on File Prior to Visit   Medication Sig Dispense Refill    levothyroxine (SYNTHROID) 100 MCG tablet Take 1 tablet (100 mcg total) by mouth once daily. 90 tablet 3    loratadine (CLARITIN) 10 mg tablet Take 10 mg by mouth once daily.      sodium chloride (SALINE NASAL) 0.65 % nasal spray 1 spray by Nasal route as needed for Congestion.       No current facility-administered medications on file prior to visit.        Allergies   Review of patient's allergies indicates:  No Known Allergies    Review of Systems (Pertinent positives)  Review of Systems   Constitutional: Negative.    HENT: Positive for congestion and sore throat. Negative for ear discharge, ear pain, hearing loss, sinus pain and tinnitus.    Eyes: Negative.    Respiratory: Positive for cough. Negative for hemoptysis, sputum production, shortness of breath and wheezing.    Cardiovascular: Negative.    Gastrointestinal: Negative.    Genitourinary: Negative.    Musculoskeletal: Negative.    Skin: Negative.          /70   Pulse 83   Temp 98.3 °F (36.8 °C) (Oral)   Ht 5' (1.524 m)   Wt 64 kg (141 lb 1.5 oz)   SpO2 96%   BMI 27.56 kg/m²     GENERAL APPEARANCE: in no apparent distress and well developed and well nourished  HEENT: PERRL, EOMI, Sclera clear, anicteric, Oropharynx shows 2 + tonsils  and exudate on left tonsil, Neck supple with midline trachea  NECK: normal, supple, no adenopathy, thyroid normal in size  RESPIRATORY: appears well, vitals normal, no respiratory distress, acyanotic, normal RR, chest clear, no wheezing, crepitations, rhonchi, normal symmetric air entry  HEART: regular rate and rhythm, S1, S2 normal, no murmur, click, rub or gallop.    ABDOMEN: abdomen is soft without tenderness, no masses, no hernias, no organomegaly, no rebound, no guarding. Suprapubic tenderness absent. No CVA tenderness.  SKIN: no rashes, no wounds, no other lesions  PSYCH: Alert, oriented x 3, thought content appropriate, speech normal, pleasant and cooperative, good eye contact, well groomed    Assessment/Plan:  Isha Leonard is a 62 y.o. female who presents today for :    Isha was seen today for sore throat, cough and headache.    Diagnoses and all orders for this visit:    Simple chronic bronchitis  -     amoxicillin (AMOXIL) 875 MG tablet; Take 1 tablet (875 mg total) by mouth every 12 (twelve) hours.      1.  Unable to do rapid strep due to no supplies in clinic  2.  Will treat with amoxil as this will cover bronchitis and strep  3.  Follow up as needed    Giovanny Han MD

## 2017-12-10 ENCOUNTER — PATIENT MESSAGE (OUTPATIENT)
Dept: INTERNAL MEDICINE | Facility: CLINIC | Age: 62
End: 2017-12-10

## 2018-01-25 ENCOUNTER — TELEPHONE (OUTPATIENT)
Dept: UROGYNECOLOGY | Facility: CLINIC | Age: 63
End: 2018-01-25

## 2018-01-25 NOTE — TELEPHONE ENCOUNTER
----- Message from Natalie Messer sent at 1/25/2018  9:17 AM CST -----  Contact: MALLORY LING [[id number]]  _x  1st Request  _  2nd Request  _  3rd Request        Who: MALLORY LING [[id number]]    Why: patient states would like a call back to schedule a follow up appt for pelvic pain.     What Number to Call Back: [phone]    When to Expect a call back: (Before the end of the day)   -- if call after 3:00 call back will be tomorrow.

## 2018-01-26 ENCOUNTER — OFFICE VISIT (OUTPATIENT)
Dept: UROGYNECOLOGY | Facility: CLINIC | Age: 63
End: 2018-01-26
Payer: COMMERCIAL

## 2018-01-26 VITALS
SYSTOLIC BLOOD PRESSURE: 136 MMHG | WEIGHT: 156.5 LBS | HEIGHT: 63 IN | DIASTOLIC BLOOD PRESSURE: 84 MMHG | BODY MASS INDEX: 27.73 KG/M2

## 2018-01-26 DIAGNOSIS — R10.2 PELVIC PRESSURE IN FEMALE: ICD-10-CM

## 2018-01-26 DIAGNOSIS — N81.11 MIDLINE CYSTOCELE: ICD-10-CM

## 2018-01-26 DIAGNOSIS — Z12.39 BREAST CANCER SCREENING: ICD-10-CM

## 2018-01-26 DIAGNOSIS — D25.9 UTERINE LEIOMYOMA, UNSPECIFIED LOCATION: Primary | ICD-10-CM

## 2018-01-26 DIAGNOSIS — M62.08 RECTUS DIASTASIS: Chronic | ICD-10-CM

## 2018-01-26 DIAGNOSIS — N39.3 URINARY, INCONTINENCE, STRESS FEMALE: ICD-10-CM

## 2018-01-26 DIAGNOSIS — M85.80 OSTEOPENIA, UNSPECIFIED LOCATION: ICD-10-CM

## 2018-01-26 PROCEDURE — 99213 OFFICE O/P EST LOW 20 MIN: CPT | Mod: S$GLB,,, | Performed by: OBSTETRICS & GYNECOLOGY

## 2018-01-26 PROCEDURE — 99999 PR PBB SHADOW E&M-EST. PATIENT-LVL III: CPT | Mod: PBBFAC,,, | Performed by: OBSTETRICS & GYNECOLOGY

## 2018-01-26 PROCEDURE — 3008F BODY MASS INDEX DOCD: CPT | Mod: S$GLB,,, | Performed by: OBSTETRICS & GYNECOLOGY

## 2018-01-26 NOTE — PATIENT INSTRUCTIONS
1)  Stage 2 cystocele, stage 1 uterovaginal prolapse, stage 2 rectocele in setting of rectus diastasis:  --stable: very mild, not past introitus; main complaint is vaginal pressure and abdominal laxity/pressure   --continue core exercises (see sheets); consider joining gym   --continue pelvic floor PT  --could consider surgery to reapproximate diastasis in future; may or may not need POP surgery (very mild)  --surgical option:  Laparoscopic hysterectomy, possible removal tubes/ovaries, suspension procedure (uterosacral vs. Sacral colpopexy); possible sling for urinary leakage  --uterine fibroids: schedule US    2)  Female stress incontinence:  --continue PT  --Empty bladder every 3 hours.  Empty well: wait a minute, lean forward on toilet.    --Avoid dietary irritants (see sheet).  Keep diary x 3-5 days to determine your irritants.  --KEGELS: do 10 in AM and 10 in PM, holding each x 10 seconds.  When you feel urge to go, STOP, KEGEL, and when urge has passed, then go to bathroom.  Consider PT in future.    --STRESS:  Pessary vs. Sling.  Consider sling if needs other (diastasis surgery); would need urodynamics    3)  Well-woman:  Last pap: 8/2016- negative  Last mammogram: 7/2016- negative.  Due--please schedule.   Colonoscopy: 2017, benign polyp; repeat 2022.    DEXA: 2015 Osteopenia - Non-compliant with calcium/vit D.  Repeat 2017-19.  Due--please schedule.     --Diverticulosis (small pockets on bowel)  on CT scan:  Start daily fiber.  Take 1 tsp of fiber powder (psyllium or other sugar-free powder).  Mix in 8 oz of water.  Take x 3-5 days.  Then, increase fiber by 1 tsp every 3-5 days until stool is easy to pass, swelling/hemorrhoids are better.  Stop and continue at that dose.   Do not exceed 6 tsps/day.  May also use over the counter stool softener 1-2 x/day.  AVOID laxatives.    Healthy bones:   --Take 600 mg calcium every AM and 600 mg calcium every PM (for total of 1200 mg daily).  Take 400 IU vitamin D in AM  and 400 IU vitamin D in the PM (for total of 800 IU daily).    --Start weight bearing exercises in improve bone density:  This type of exercise forces you to work against gravity. Some examples of weight-bearing exercises include weight training, walking, hiking, jogging, climbing stairs, tennis, and dancing. Examples of exercises that are not weight-bearing include swimming and bicycling. Although these activities help build and maintain strong muscles and have excellent cardiovascular benefits, they are not the best way to exercise your bones.  --Follow up for repeat DEXA (bone density testing) as scheduled.     4)  Will call you with pelvic US, mammogram, and DEXA results. When we call you with US results, let us know if would like pessary, surgery, or waiting.

## 2018-01-26 NOTE — PROGRESS NOTES
Urogyn follow up    [facility] [address] [place] [address]    Isha Leonard  [id number]  1955      Isha Leonard is a 62 y.o. here for a urogyn follow up.  The patient's son was present for all parts of discussion.     1)  UI:  (+) HUMZA.  (--) UUI  X 10years.  (+) pads:2/day, usually minimum wetness.  Daytime frequency: Q 2 hours.  Nocturia: No: 2/night.   (+) dysuria,  (--) hematuria,  (--) frequent UTIs.  (--) complete bladder emptying. Has to lean forward.    2)  POP:  Present. above introitus.  Symptoms:(+)  pressure.  (--) vaginal bleeding. (--) vaginal discharge. (+) sexually active.  (--) dyspareunia.  (--)  Vaginal dryness.  (--) vaginal estrogen use.     3)  BM:  (--) constipation/straining.  (--) chronic diarrhea. (--) hematochezia.  (--) fecal incontinence.  (--) fecal smearing/urgency.  (--) incomplete evacuation.     Initial exam:  PELVIC:    External genitalia:  Normal Bartholins, Skenes and labia bilaterally.    Urethra:  No caruncle, diverticulum or masses.  (--) hypermobility.    Vagina:  Atrophy (--) , no bladder masses or tender, no discharge.    Cervix:  normal appearance  Uterus: enlarged, 12 weeks size. Mobile, NT.    Adnexa: Not palpable.    POP-Q:  Aa 0; Ba 0; C -5; Ap -1; Bp -1; D -7.  Genital hiatus 3, perineal body 2 total vaginal length 11-12 (standing).        Past Medical History  Past Medical History:   Diagnosis Date    Anemia     AR (allergic rhinitis) 2/28/2013    Bilateral renal cysts: see CT scan May 2017 5/5/2017    Colon adenoma: 2011- repeat colonoscopy 12/16 7/19/2016    Colon polyp     Diverticulosis of large intestine without hemorrhage: see CT scan May 2017 5/5/2017    Fatty liver     GERD (gastroesophageal reflux disease)     Glucose intolerance (impaired glucose tolerance)     Hyperlipidemia     Hypertension     Pneumonia     Thyroid disease     Thyroid nodule: s/p R thyroidectomy; L side wnl 2014 2/17/2014     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      Past Surgical History  Past Surgical History:   Procedure Laterality Date    BREAST BIOPSY      Left, benign    COLONOSCOPY N/A 3/3/2017    Procedure: COLONOSCOPY;  Surgeon: Mauri Richardson MD;  Location: [facility] ([address] [place] [address]);  Service: Endoscopy;  Laterality: N/A;  pt req Dr Richardson    COLONOSCOPY W/ POLYPECTOMY      THYROIDECTOMY, PARTIAL      secondary to thyroid nodule      Hysterectomy: No  Cervix present/absent: Yes (2016- negative)  Ovaries present/absent: Yes    Past Ob History     x 4.      Largest infant weight: 8lb 2oz  yes FAVD. unknown episiotomy.      Gynecologic History  LMP: No LMP recorded. Patient is postmenopausal.  Age of menarche: 14  Age of menopause: 54  Menstrual history: Regular  Last pap: 2016- negative  Last mammogram: 2016- negative  Colonoscopy: Planned for march  DEXA: Osteopenia - Non-compliant with calcium/vit D    Issues include:  Patient Active Problem List   Diagnosis    Familial hypercholesterolemia    Acquired hypothyroidism    AR (allergic rhinitis)    Glucose intolerance (impaired glucose tolerance)    Gallstones: see ultrasound     Colon adenoma: - also 3/17    Fatty liver: see u/s     Midline cystocele    Rectus diastasis    Urinary, incontinence, stress female    Pelvic pressure in female    Diverticulosis of large intestine without hemorrhage: see CT scan May 2017    Bilateral renal cysts: see CT scan May 2017       History since last visit:   1)  Stage 2 cystocele, stage 1 uterovaginal prolapse, stage 2 rectocele in setting of rectus diastasis:\  --s/p PT with [facility]--feels that pressure is better, not perfect; does exercises sometimes  --has noted some new, increased pressure/something dropping more    2)  Female stress incontinence:  --only with coughing -- a little worse; sometimes wears pad if leaking    3)  Well-woman:  Last pap: 2016- negative  Last mammogram: 2016- negative  Colonoscopy:  "2017, benign polyp; repeat 2022.    DEXA: 2015 Osteopenia - Non-compliant with calcium/vit D.  Repeat 2017-19.      Healthy bones:   --Take 600 mg calcium every AM and 600 mg calcium every PM (for total of 1200 mg daily).  Take 400 IU vitamin D in AM and 400 IU vitamin D in the PM (for total of 800 IU daily).    --Start weight bearing exercises in improve bone density:  This type of exercise forces you to work against gravity. Some examples of weight-bearing exercises include weight training, walking, hiking, jogging, climbing stairs, tennis, and dancing. Examples of exercises that are not weight-bearing include swimming and bicycling. Although these activities help build and maintain strong muscles and have excellent cardiovascular benefits, they are not the best way to exercise your bones.  --Follow up for repeat DEXA (bone density testing) as scheduled.     4)  RTC 2-3 months.      Medications:    Current Outpatient Prescriptions:     levothyroxine (SYNTHROID) 100 MCG tablet, Take 1 tablet (100 mcg total) by mouth once daily., Disp: 90 tablet, Rfl: 3    loratadine (CLARITIN) 10 mg tablet, Take 10 mg by mouth once daily., Disp: , Rfl:     sodium chloride (SALINE NASAL) 0.65 % nasal spray, 1 spray by Nasal route as needed for Congestion., Disp: , Rfl:     amoxicillin (AMOXIL) 875 MG tablet, Take 1 tablet (875 mg total) by mouth every 12 (twelve) hours., Disp: 20 tablet, Rfl: 0    Prior studies or therapies include:   CT A/P 5/3/17:  1.  Lobulated uterus with dystrophic calcifications, likely representing uterine fibroids.  2.  Cholelithiasis.  3.  Bilateral tiny renal hypodensities, too small to characterize possibly cysts.  4.  Colonic diverticulosis.  5.  Degenerative changes of the lumbar spine with bilateral facet arthropathy at L4-5 and L5-S1 and grade I anterolisthesis L4-5.    ROS:  As per HPI.      Exam  /84 (BP Location: Right arm, Patient Position: Sitting)   Ht 5' 3" (1.6 m)   Wt 71 kg (156 " lb 8.4 oz)   BMI 27.73 kg/m²   General: alert and oriented, no acute distress    PELVIC:    External genitalia:  Normal Bartholins, Skenes and labia bilaterally.    Urethra:  No caruncle, diverticulum or masses.  (--) hypermobility.    Vagina:  Atrophy (--) , no bladder masses or tender, no discharge.    Cervix:  normal appearance  Uterus: enlarged, 12 weeks size. Mobile, NT.    Adnexa: Not palpable.    POP-Q:  Aa 0; Ba 0; C -5; Ap -1; Bp -1; D -7.  Genital hiatus 3, perineal body 2 total vaginal length 11-12 (standing).       Impression  1. Uterine leiomyoma, unspecified location  US Pelvis Comp with Transvag NON-OB (xpd)    CANCELED: US Pelvis Complete Non OB   2. Breast cancer screening  Mammo Digital Screening Bilat With CAD   3. Osteopenia, unspecified location  DXA Bone Density Spine And Hip   4. Midline cystocele     5. Urinary, incontinence, stress female     6. Pelvic pressure in female     7. Rectus diastasis       We reviewed the above issues and discussed options for short-term versus long-term management of her problems.   Plan:   1)  Stage 2 cystocele, stage 1 uterovaginal prolapse, stage 2 rectocele in setting of rectus diastasis:  --stable: very mild, not past introitus; main complaint is vaginal pressure and abdominal laxity/pressure   --continue core exercises (see sheets); consider joining gym   --continue pelvic floor PT  --could consider surgery to reapproximate diastasis in future; may or may not need POP surgery (very mild)  --surgical option:  Laparoscopic hysterectomy, possible removal tubes/ovaries, suspension procedure (uterosacral vs. Sacral colpopexy); possible sling for urinary leakage  --uterine fibroids: schedule US    2)  Female stress incontinence:  --continue PT  --Empty bladder every 3 hours.  Empty well: wait a minute, lean forward on toilet.    --Avoid dietary irritants (see sheet).  Keep diary x 3-5 days to determine your irritants.  --KEGELS: do 10 in AM and 10 in PM, holding  each x 10 seconds.  When you feel urge to go, STOP, KEGEL, and when urge has passed, then go to bathroom.  Consider PT in future.    --STRESS:  Pessary vs. Sling.  Consider sling if needs other (diastasis surgery); would need urodynamics    3)  Well-woman:  Last pap: 8/2016- negative  Last mammogram: 7/2016- negative.  Due--please schedule.   Colonoscopy: 2017, benign polyp; repeat 2022.    DEXA: 2015 Osteopenia - Non-compliant with calcium/vit D.  Repeat 2017-19.  Due--please schedule.     --Diverticulosis (small pockets on bowel)  on CT scan:  Start daily fiber.  Take 1 tsp of fiber powder (psyllium or other sugar-free powder).  Mix in 8 oz of water.  Take x 3-5 days.  Then, increase fiber by 1 tsp every 3-5 days until stool is easy to pass, swelling/hemorrhoids are better.  Stop and continue at that dose.   Do not exceed 6 tsps/day.  May also use over the counter stool softener 1-2 x/day.  AVOID laxatives.    Healthy bones:   --Take 600 mg calcium every AM and 600 mg calcium every PM (for total of 1200 mg daily).  Take 400 IU vitamin D in AM and 400 IU vitamin D in the PM (for total of 800 IU daily).    --Start weight bearing exercises in improve bone density:  This type of exercise forces you to work against gravity. Some examples of weight-bearing exercises include weight training, walking, hiking, jogging, climbing stairs, tennis, and dancing. Examples of exercises that are not weight-bearing include swimming and bicycling. Although these activities help build and maintain strong muscles and have excellent cardiovascular benefits, they are not the best way to exercise your bones.  --Follow up for repeat DEXA (bone density testing) as scheduled.     4)  Will call you with pelvic US, mammogram, and DEXA results. When we call you with US results, let us know if would like pessary, surgery, or waiting.     30 minutes were spent in face to face time with this patient  90 % of this time was spent in counseling  and/or coordination of care  ME@  [facility]  Division of Female Pelvic Medicine and Reconstructive Surgery  Department of Obstetrics & Gynecology

## 2018-02-02 ENCOUNTER — TELEPHONE (OUTPATIENT)
Dept: UROGYNECOLOGY | Facility: CLINIC | Age: 63
End: 2018-02-02

## 2018-02-02 ENCOUNTER — HOSPITAL ENCOUNTER (OUTPATIENT)
Dept: RADIOLOGY | Facility: CLINIC | Age: 63
Discharge: HOME | End: 2018-02-02
Attending: OBSTETRICS & GYNECOLOGY
Payer: COMMERCIAL

## 2018-02-02 ENCOUNTER — HOSPITAL ENCOUNTER (OUTPATIENT)
Dept: RADIOLOGY | Facility: HOSPITAL | Age: 63
Discharge: HOME | End: 2018-02-02
Attending: OBSTETRICS & GYNECOLOGY
Payer: COMMERCIAL

## 2018-02-02 DIAGNOSIS — M85.80 OSTEOPENIA, UNSPECIFIED LOCATION: ICD-10-CM

## 2018-02-02 DIAGNOSIS — Z12.39 BREAST CANCER SCREENING: ICD-10-CM

## 2018-02-02 DIAGNOSIS — D25.9 UTERINE LEIOMYOMA, UNSPECIFIED LOCATION: ICD-10-CM

## 2018-02-02 PROCEDURE — 77067 SCR MAMMO BI INCL CAD: CPT | Mod: TC

## 2018-02-02 PROCEDURE — 76830 TRANSVAGINAL US NON-OB: CPT | Mod: TC

## 2018-02-02 PROCEDURE — 77067 SCR MAMMO BI INCL CAD: CPT | Mod: 26,,, | Performed by: RADIOLOGY

## 2018-02-02 PROCEDURE — 77080 DXA BONE DENSITY AXIAL: CPT | Mod: 26,,, | Performed by: INTERNAL MEDICINE

## 2018-02-02 PROCEDURE — 76830 TRANSVAGINAL US NON-OB: CPT | Mod: 26,,, | Performed by: RADIOLOGY

## 2018-02-02 PROCEDURE — 77080 DXA BONE DENSITY AXIAL: CPT | Mod: TC

## 2018-02-02 PROCEDURE — 76856 US EXAM PELVIC COMPLETE: CPT | Mod: 26,,, | Performed by: RADIOLOGY

## 2018-02-02 NOTE — TELEPHONE ENCOUNTER
----- Message from Ese Salazar MD sent at 2/2/2018  3:09 PM CST -----  Please let patient know mammogram was normal. Thanks!  
Informed pt of normal mammogram. Pt voiced understanding and call ended.    
controlled

## 2018-02-05 ENCOUNTER — TELEPHONE (OUTPATIENT)
Dept: UROGYNECOLOGY | Facility: CLINIC | Age: 63
End: 2018-02-05

## 2018-02-12 ENCOUNTER — TELEPHONE (OUTPATIENT)
Dept: UROGYNECOLOGY | Facility: CLINIC | Age: 63
End: 2018-02-12

## 2018-02-12 NOTE — TELEPHONE ENCOUNTER
Called pt on all 3 contact numbers,no answer,Left voice message for pt and or son to give the office a call back at [phone]. Regarding bone density results.   Occupational Therapy  Treatment    Juliane Ramos   MRN: [id number]   Admitting Diagnosis: SAH    OT Date of Treatment: 12/26/17   Total Time (min): 75 min    Billable Minutes:  Therapeutic Activity 10 and Therapeutic Exercise 65  Total Minutes: 75    General Precautions: Standard, fall  Orthopedic Precautions: LLE non weight bearing  Braces:  (L LE CAM boot)    Do you have any cultural, spiritual, Taoism conflicts, given your current situation?: None    Subjective:  Communicated with nurse prior to session.    Pain/Comfort  Pain Rating 1: 0/10    Objective:  Patient found with: oxygen (3 liters/min)    Functional Mobility:  Wheelchair propulsion > 50 ft with Supervision    Feeding:  Set-up of lunch tray    Additional Treatment:  - UBE 2 sets x 5 mins   - Resistance pulleys with 25# for lat pull downs & anterior chest press 2 x 15 reps; 10# for biceps/triceps strengthening 2 sets x 15 reps  - Rickshaw with 30# for triceps strengthening 2 sets x 20 reps  - 3# dowel for PNF pattern 2 sets x 10 reps; 5# dowel for UE strengthening 2 planes 2 sets x 10 reps with rest breaks as needed    Patient left up in chair with all lines intact, chair alarm on, nurse notified and nursing  present in pt dining room     ASSESSMENT:  Juliane Ramos is a 54 y.o. female with a medical diagnosis of SAH and presents with daily improvements towards OT goals. Patient should continue to benefit from skilled OT services per est POC to increase functional independence, mobility, and safety.     Rehab potential is good    Activity tolerance: Good    Discharge recommendations: home     Equipment recommendations:  (tbd)     GOALS:    Occupational Therapy Goals        Problem: Occupational Therapy Goal    Goal Priority Disciplines Outcome Interventions   Occupational Therapy Goal     OT, PT/OT Ongoing (interventions implemented as appropriate)    Description:  Goals to be met by: 12/29/17     Patient will increase functional independence with  ADLs by performing:    Feeding with [place].  UE Dressing with [place].  LE Dressing with Supervision.  Grooming while seated with [place].  Toileting from toilet with bedside commode positioned over Minimal Assistance for hygiene and clothing management.   Bathing from shower chair/bench with Minimal Assistance with DME/AE as needed.  Shower transfer with Minimal Assistance with DME/AE as needed .  Toilet transfer to toilet with Minimal Assistance with DME as needed.                      Plan:  Patient to be seen 6 x/week to address the above listed problems via self-care/home management, community/work re-entry, therapeutic activities, therapeutic exercises, therapeutic groups, neuromuscular re-education, cognitive retraining, wheelchair management/training  Plan of Care expires: 12/29/17  Plan of Care reviewed with: patient    SCOTT Antunez LOTR  12/26/2017

## 2018-02-12 NOTE — TELEPHONE ENCOUNTER
Called pt on all 3 contact numbers,no answer,Left voice message for pt and or son to give the office a call back at [phone]. Regarding bone density results.

## 2018-02-12 NOTE — TELEPHONE ENCOUNTER
----- Message from Ese Salazar MD sent at 2/12/2018 12:00 PM CST -----  Please let patient (and her /son) know that her bone density test shows osteopenia (weak bones but not consuelo osteoporosis).  The risk of fracture is not elevated enough to warrant treatment.  But, we do need to do the following:  --Take 600 mg calcium every AM and 600 mg calcium every PM (for total of 1200 mg daily).  Take 400 IU vitamin D in AM and 400 IU vitamin D in the PM (for total of 800 IU daily).  She really needs to do this, as I know she has had difficulty with this in the past.    --Start weight bearing exercises in improve bone density:  This type of exercise forces you to work against gravity. Some examples of weight-bearing exercises include weight training, walking, hiking, jogging, climbing stairs, tennis, and dancing. Examples of exercises that are not weight-bearing include swimming and bicycling. Although these activities help build and maintain strong muscles and have excellent cardiovascular benefits, they are not the best way to exercise your bones.  --Follow up for repeat DEXA (bone density testing) in 2-3 years as scheduled.

## 2018-02-14 ENCOUNTER — TELEPHONE (OUTPATIENT)
Dept: UROGYNECOLOGY | Facility: CLINIC | Age: 63
End: 2018-02-14

## 2018-02-14 NOTE — TELEPHONE ENCOUNTER
Relayed the following message to pt:  Let patient (and her /son) know that her bone density test shows osteopenia (weak bones but not consuelo osteoporosis).  The risk of fracture is not elevated enough to warrant treatment.  But, we do need to do the following:   --Take 600 mg calcium every AM and 600 mg calcium every PM (for total of 1200 mg daily).  Take 400 IU vitamin D in AM and 400 IU vitamin D in the PM (for total of 800 IU daily).  She really needs to do this, as I know she has had difficulty with this in the past.     --Start weight bearing exercises in improve bone density:  This type of exercise forces you to work against gravity. Some examples of weight-bearing exercises include weight training, walking, hiking, jogging, climbing stairs, tennis, and dancing. Examples of exercises that are not weight-bearing include swimming and bicycling. Although these activities help build and maintain strong muscles and have excellent cardiovascular benefits, they are not the best way to exercise your bones.   --Follow up for repeat DEXA (bone density testing) in 2-3 years as scheduled.

## 2018-02-14 NOTE — TELEPHONE ENCOUNTER
----- Message from Marquis Garcia sent at 2/14/2018 11:31 AM CST -----  Pt returning your call please call her back before 1 p.m. [phone]

## 2018-02-15 ENCOUNTER — TELEPHONE (OUTPATIENT)
Dept: UROGYNECOLOGY | Facility: CLINIC | Age: 63
End: 2018-02-15

## 2018-02-15 NOTE — TELEPHONE ENCOUNTER
----- Message from Marsha Calle sent at 2/15/2018  3:54 PM CST -----  Contact: pt   _x  1st Request  _  2nd Request  _  3rd Request      Who:td    Why: returning call back    What Number to Call Back: [phone]    When to Expect a call back: (Before the end of the day)   -- if call after 3:00 call back will be tomorrow.

## 2018-02-15 NOTE — TELEPHONE ENCOUNTER
Pt's  [place] and will contact the office when she return. Informed pt's  I'll relay this message. He voiced understanding and call ended.

## 2018-03-02 ENCOUNTER — PATIENT MESSAGE (OUTPATIENT)
Dept: UROGYNECOLOGY | Facility: CLINIC | Age: 63
End: 2018-03-02

## 2018-03-02 ENCOUNTER — TELEPHONE (OUTPATIENT)
Dept: UROGYNECOLOGY | Facility: CLINIC | Age: 63
End: 2018-03-02

## 2018-03-02 NOTE — TELEPHONE ENCOUNTER
Contacted patient regarding scheduling surgery.  She wants to have abdominoplasty done at the same time.  Phone number for Dr. Chriss Lemons given to patient.  MILAGRO Quintanilla-BC

## 2018-03-02 NOTE — TELEPHONE ENCOUNTER
----- Message from Alexa Bernal sent at 2/14/2018 12:02 PM CST -----  Pt would like to discuss Surgery dates.

## 2018-03-12 ENCOUNTER — TELEPHONE (OUTPATIENT)
Dept: UROGYNECOLOGY | Facility: CLINIC | Age: 63
End: 2018-03-12

## 2018-03-12 NOTE — TELEPHONE ENCOUNTER
Spoke with pt who was requesting the name and number of a plastic surgeon located at [facility], relayed information to ZAC Moreno who states she will have to discuss this matter with  because if would have to be a physician that would agree to do the surgery along with , pt voiced understanding and call was ended.

## 2018-03-12 NOTE — TELEPHONE ENCOUNTER
----- Message from Preeti Paramjitshanna sent at 3/12/2018  4:06 PM CDT -----  Contact: MALLORY LING [[id number]]  x_  1st Request  _  2nd Request  _  3rd Request        Who: MALLOYR LING [[id number]]    Why: Requesting a call back in regards to a call she missed for the office, please call her.   Please return the call at earliest convenience. Thanks!    What Number to Call Back: [phone]      When to Expect a call back: (Within 24 hours)

## 2018-03-13 RX ORDER — LEVOTHYROXINE SODIUM 100 UG/1
100 TABLET ORAL DAILY
Qty: 90 TABLET | Refills: 3 | Status: SHIPPED | OUTPATIENT
Start: 2018-03-13 | End: 2019-04-16

## 2018-03-15 ENCOUNTER — OFFICE VISIT (OUTPATIENT)
Dept: INTERNAL MEDICINE | Facility: CLINIC | Age: 63
End: 2018-03-15
Payer: COMMERCIAL

## 2018-03-15 VITALS
HEIGHT: 63 IN | DIASTOLIC BLOOD PRESSURE: 80 MMHG | WEIGHT: 149.94 LBS | BODY MASS INDEX: 26.57 KG/M2 | SYSTOLIC BLOOD PRESSURE: 135 MMHG

## 2018-03-15 DIAGNOSIS — K76.0 FATTY LIVER: ICD-10-CM

## 2018-03-15 DIAGNOSIS — M62.08 RECTUS DIASTASIS: Chronic | ICD-10-CM

## 2018-03-15 DIAGNOSIS — E78.01 FAMILIAL HYPERCHOLESTEROLEMIA: ICD-10-CM

## 2018-03-15 DIAGNOSIS — K80.20 GALLSTONES: ICD-10-CM

## 2018-03-15 DIAGNOSIS — Z00.00 ANNUAL PHYSICAL EXAM: Primary | ICD-10-CM

## 2018-03-15 DIAGNOSIS — R73.02 GLUCOSE INTOLERANCE (IMPAIRED GLUCOSE TOLERANCE): ICD-10-CM

## 2018-03-15 DIAGNOSIS — N39.3 URINARY, INCONTINENCE, STRESS FEMALE: ICD-10-CM

## 2018-03-15 DIAGNOSIS — M85.89 OSTEOPENIA OF MULTIPLE SITES: ICD-10-CM

## 2018-03-15 DIAGNOSIS — D25.1 INTRAMURAL LEIOMYOMA OF UTERUS: ICD-10-CM

## 2018-03-15 DIAGNOSIS — E03.9 ACQUIRED HYPOTHYROIDISM: ICD-10-CM

## 2018-03-15 PROCEDURE — 99396 PREV VISIT EST AGE 40-64: CPT | Mod: S$GLB,,, | Performed by: INTERNAL MEDICINE

## 2018-03-15 PROCEDURE — 99999 PR PBB SHADOW E&M-EST. PATIENT-LVL III: CPT | Mod: PBBFAC,,, | Performed by: INTERNAL MEDICINE

## 2018-03-15 RX ORDER — LORATADINE 10 MG/1
10 TABLET ORAL DAILY PRN
Qty: 30 TABLET | Refills: 12 | Status: SHIPPED | OUTPATIENT
Start: 2018-03-15 | End: 2018-05-25

## 2018-03-15 NOTE — PROGRESS NOTES
Subjective:       Patient ID: Isha Leonard is a 62 y.o. female.    Chief Complaint: Annual Exam    Booked as a preoperative exam, but she has lots of questions about her surgery.    Has seen GYN with regard to uterine fibroids and prolapse and need for surgery; does also discussion about abdominoplasty for her diastasis.  She is not sure about who the plastic surgeon would be to take care of this.    She's also due for follow-up lab work for her IFG as well as ultrasound for her gallstones and fatty liver.    She has had no syncope, chest pain, pressure, tightness or shortness of breath.  She denies GI symptoms.    Patient Active Problem List:     Familial hypercholesterolemia     Acquired hypothyroidism     AR (allergic rhinitis)     Glucose intolerance (impaired glucose tolerance)     Gallstones: see ultrasound 2014; CT 2017     Colon adenoma: 2011- also 3/17     Fatty liver: see u/s 2014; stable CT 2017     Midline cystocele     Rectus diastasis     Urinary, incontinence, stress female     Pelvic pressure in female     Diverticulosis of large intestine without hemorrhage: see CT scan May 2017     Bilateral renal cysts: see CT scan May 2017     Osteopenia of multiple sites: see DEXA 2/18        Review of Systems   Constitutional: Negative for activity change, appetite change, chills, fatigue and fever.   HENT: Negative for congestion, hearing loss, sinus pressure and sore throat.    Eyes: Negative for visual disturbance.   Respiratory: Negative for cough and shortness of breath.    Cardiovascular: Negative for chest pain, palpitations and leg swelling.   Gastrointestinal: Negative for abdominal distention, abdominal pain, constipation, diarrhea, nausea and vomiting.   Genitourinary: Positive for frequency. Negative for dysuria, hematuria and vaginal bleeding.        HUMAZ   Musculoskeletal: Negative for gait problem, joint swelling and myalgias.   Skin: Negative for rash.   Neurological: Negative for dizziness,  weakness, light-headedness and headaches.   Hematological: Negative for adenopathy. Does not bruise/bleed easily.   Psychiatric/Behavioral: Negative for confusion, hallucinations, sleep disturbance and suicidal ideas.       Objective:      Physical Exam   Constitutional: She is oriented to person, place, and time. She appears well-developed and well-nourished.   HENT:   Head: Normocephalic and atraumatic.   Right Ear: External ear normal.   Left Ear: External ear normal.   Nose: Nose normal.   Mouth/Throat: Oropharynx is clear and moist. No oropharyngeal exudate.   Eyes: Conjunctivae and EOM are normal. No scleral icterus.   Neck: Normal range of motion. Neck supple. No JVD present. No thyromegaly present.   Thyroid scar   Cardiovascular: Normal rate, regular rhythm, normal heart sounds and intact distal pulses.  Exam reveals no gallop.    No murmur heard.  Pulmonary/Chest: Effort normal and breath sounds normal. No respiratory distress. She has no wheezes.   Abdominal: Soft. Bowel sounds are normal. She exhibits no distension. There is no tenderness. There is no guarding.   Diastasis  Enlarged uterus   Musculoskeletal: Normal range of motion. She exhibits no edema or tenderness.   Lymphadenopathy:     She has no cervical adenopathy.   Neurological: She is alert and oriented to person, place, and time. She displays normal reflexes. No cranial nerve deficit. Coordination normal.   Skin: Skin is warm. No rash noted. No erythema.   Psychiatric: She has a normal mood and affect. Her behavior is normal. Judgment and thought content normal.   Nursing note and vitals reviewed.      Assessment:       1. Annual physical exam    2. Acquired hypothyroidism    3. Familial hypercholesterolemia    4. Glucose intolerance (impaired glucose tolerance)    5. Rectus diastasis    6. Osteopenia of multiple sites: see DEXA 2/18    7. Gallstones: see ultrasound 2014; CT 2017    8. Fatty liver: see u/s 2014; stable CT 2017    9. Urinary,  incontinence, stress female    10. Intramural leiomyoma of uterus        Plan:         Annual physical exam  -     CBC auto differential; Future; Expected date: 03/15/2018  -     Comprehensive metabolic panel; Future; Expected date: 03/15/2018  -     Lipid panel; Future; Expected date: 03/15/2018  -     Hemoglobin A1c; Future; Expected date: 03/15/2018  -     TSH; Future; Expected date: 03/15/2018  -     Vitamin D; Future; Expected date: 03/15/2018    Acquired hypothyroidism: Labs and review    Familial hypercholesterolemia: Labs and review    Glucose intolerance (impaired glucose tolerance): Labs and review    Rectus diastasis: consider plastic surgery    Osteopenia of multiple sites: see DEXA 2/18: Exercise and fall prevention reviewed    Gallstones: see ultrasound 2014; CT 2017: Stable, anticipate follow-up in the next 1-2 years, no symptoms currently    Fatty liver: see u/s 2014; stable CT 2017: Stable, anticipate follow-up in the next 1-2 years, no symptoms currently    Urinary, incontinence, stress female: UroGyn follow up    Intramural leiomyoma of uterus: UroGyn follow up    Other orders  -     loratadine (CLARITIN) 10 mg tablet; Take 1 tablet (10 mg total) by mouth daily as needed for Allergies.  Dispense: 30 tablet; Refill: 12    I will review all studies and determine further tx depending on findings

## 2018-03-15 NOTE — Clinical Note
Bernabe Mulligan:  She seemed a little bit confused about the process for surgery and how to go about seeing a plastic surgeon.  She wasn't able to find Dr. Martinez to schedule.  Perhaps one of your staff can reach out to her?  Tthere may be a language barrier too, but I know 2 of her kids are physicians and may be able to help.  Gwen

## 2018-03-19 ENCOUNTER — LAB VISIT (OUTPATIENT)
Dept: LAB | Facility: HOSPITAL | Age: 63
End: 2018-03-19
Attending: INTERNAL MEDICINE
Payer: COMMERCIAL

## 2018-03-19 ENCOUNTER — TELEPHONE (OUTPATIENT)
Dept: INTERNAL MEDICINE | Facility: CLINIC | Age: 63
End: 2018-03-19

## 2018-03-19 DIAGNOSIS — Z00.00 ANNUAL PHYSICAL EXAM: ICD-10-CM

## 2018-03-19 DIAGNOSIS — E87.5 HYPERKALEMIA: Primary | ICD-10-CM

## 2018-03-19 DIAGNOSIS — E55.9 MILD VITAMIN D DEFICIENCY: ICD-10-CM

## 2018-03-19 LAB
25(OH)D3+25(OH)D2 SERPL-MCNC: 28 NG/ML
ALBUMIN SERPL BCP-MCNC: 3.8 G/DL
ALP SERPL-CCNC: 74 U/L
ALT SERPL W/O P-5'-P-CCNC: 23 U/L
ANION GAP SERPL CALC-SCNC: 9 MMOL/L
AST SERPL-CCNC: 21 U/L
BASOPHILS # BLD AUTO: 0.05 K/UL
BASOPHILS NFR BLD: 0.8 %
BILIRUB SERPL-MCNC: 1 MG/DL
BUN SERPL-MCNC: 13 MG/DL
CALCIUM SERPL-MCNC: 9.7 MG/DL
CHLORIDE SERPL-SCNC: 104 MMOL/L
CHOLEST SERPL-MCNC: 196 MG/DL
CHOLEST/HDLC SERPL: 4.1 {RATIO}
CO2 SERPL-SCNC: 28 MMOL/L
CREAT SERPL-MCNC: 0.8 MG/DL
DIFFERENTIAL METHOD: ABNORMAL
EOSINOPHIL # BLD AUTO: 0.1 K/UL
EOSINOPHIL NFR BLD: 2 %
ERYTHROCYTE [DISTWIDTH] IN BLOOD BY AUTOMATED COUNT: 12.6 %
EST. GFR  (AFRICAN AMERICAN): >60 ML/MIN/1.73 M^2
EST. GFR  (NON AFRICAN AMERICAN): >60 ML/MIN/1.73 M^2
ESTIMATED AVG GLUCOSE: 120 MG/DL
GLUCOSE SERPL-MCNC: 102 MG/DL
HBA1C MFR BLD HPLC: 5.8 %
HCT VFR BLD AUTO: 44 %
HDLC SERPL-MCNC: 48 MG/DL
HDLC SERPL: 24.5 %
HGB BLD-MCNC: 14.1 G/DL
IMM GRANULOCYTES # BLD AUTO: 0.01 K/UL
IMM GRANULOCYTES NFR BLD AUTO: 0.2 %
LDLC SERPL CALC-MCNC: 107.4 MG/DL
LYMPHOCYTES # BLD AUTO: 3.3 K/UL
LYMPHOCYTES NFR BLD: 50.9 %
MCH RBC QN AUTO: 28.7 PG
MCHC RBC AUTO-ENTMCNC: 32 G/DL
MCV RBC AUTO: 89 FL
MONOCYTES # BLD AUTO: 0.4 K/UL
MONOCYTES NFR BLD: 6 %
NEUTROPHILS # BLD AUTO: 2.6 K/UL
NEUTROPHILS NFR BLD: 40.1 %
NONHDLC SERPL-MCNC: 148 MG/DL
NRBC BLD-RTO: 0 /100 WBC
PLATELET # BLD AUTO: 347 K/UL
PMV BLD AUTO: 9.9 FL
POTASSIUM SERPL-SCNC: 5.3 MMOL/L
PROT SERPL-MCNC: 7.2 G/DL
RBC # BLD AUTO: 4.92 M/UL
SODIUM SERPL-SCNC: 141 MMOL/L
TRIGL SERPL-MCNC: 203 MG/DL
TSH SERPL DL<=0.005 MIU/L-ACNC: 2.15 UIU/ML
WBC # BLD AUTO: 6.45 K/UL

## 2018-03-19 PROCEDURE — 83036 HEMOGLOBIN GLYCOSYLATED A1C: CPT

## 2018-03-19 PROCEDURE — 84443 ASSAY THYROID STIM HORMONE: CPT

## 2018-03-19 PROCEDURE — 82306 VITAMIN D 25 HYDROXY: CPT

## 2018-03-19 PROCEDURE — 80053 COMPREHEN METABOLIC PANEL: CPT

## 2018-03-19 PROCEDURE — 85025 COMPLETE CBC W/AUTO DIFF WBC: CPT

## 2018-03-19 PROCEDURE — 36415 COLL VENOUS BLD VENIPUNCTURE: CPT | Mod: PO

## 2018-03-19 PROCEDURE — 80061 LIPID PANEL: CPT

## 2018-03-20 ENCOUNTER — TELEPHONE (OUTPATIENT)
Dept: UROGYNECOLOGY | Facility: CLINIC | Age: 63
End: 2018-03-20

## 2018-03-20 NOTE — TELEPHONE ENCOUNTER
Spoke with pt, notified, pt verbalized understanding. Lab appt for potassium made for Monday. 4 mo lab appt made, mailed.

## 2018-03-20 NOTE — TELEPHONE ENCOUNTER
----- Message from Georgiana Zepeda sent at 3/20/2018  4:26 PM CDT -----  Contact: Alvaro Leonard [phone]  Alvaro is returning a missed call regarding his mother, he can be reached at [phone].

## 2018-03-20 NOTE — TELEPHONE ENCOUNTER
She has low vitamin D.  She should take 2000 IU daily cholecalciferol over the counter.    Labs otherwise OK but potassium is high- I thinl a lab error    Can she repeat K in 1 week? Orders in thanks    Recheck D in 4 months (orders in) thanks

## 2018-03-21 ENCOUNTER — TELEPHONE (OUTPATIENT)
Dept: UROGYNECOLOGY | Facility: CLINIC | Age: 63
End: 2018-03-21

## 2018-03-21 NOTE — TELEPHONE ENCOUNTER
----- Message from Natalie Messer sent at 3/21/2018  1:40 PM CDT -----  Contact: Alvaro   _marley  1st Request  _  2nd Request  _  3rd Request        Who: Alvaro    Why: patient's son is returning a call for back in regards to his mother.     What Number to Call Back: [phone]    When to Expect a call back: (Before the end of the day)   -- if call after 3:00 call back will be tomorrow.

## 2018-03-21 NOTE — TELEPHONE ENCOUNTER
Pt's son would like Dr. Salazar to give him a call. He states he missed her call. He wants to discuss an abdominoplasty at the same time as surgery. Pt also requests that her surgery is scheduled  At .

## 2018-03-22 ENCOUNTER — TELEPHONE (OUTPATIENT)
Dept: INTERNAL MEDICINE | Facility: CLINIC | Age: 63
End: 2018-03-22

## 2018-03-23 ENCOUNTER — TELEPHONE (OUTPATIENT)
Dept: UROGYNECOLOGY | Facility: CLINIC | Age: 63
End: 2018-03-23

## 2018-03-23 ENCOUNTER — TELEPHONE (OUTPATIENT)
Dept: UROLOGY | Facility: CLINIC | Age: 63
End: 2018-03-23

## 2018-03-23 NOTE — TELEPHONE ENCOUNTER
----- Message from Georgiana Zepeda sent at 3/23/2018  4:06 PM CDT -----  Contact: Alvaro John is returning a missed call, regarding his mom, he can be reached at [phone].

## 2018-03-26 ENCOUNTER — PATIENT MESSAGE (OUTPATIENT)
Dept: INTERNAL MEDICINE | Facility: CLINIC | Age: 63
End: 2018-03-26

## 2018-03-26 ENCOUNTER — TELEPHONE (OUTPATIENT)
Dept: PLASTIC SURGERY | Facility: CLINIC | Age: 63
End: 2018-03-26

## 2018-03-26 NOTE — TELEPHONE ENCOUNTER
Called the patient and told her that we would not have anything sooner but I can leave her on the wait list anything comes up sooner they will reach out to her and offer her that

## 2018-03-26 NOTE — TELEPHONE ENCOUNTER
----- Message from Tony Petty sent at 3/26/2018 10:34 AM CDT -----  Pt wants to know if she can be seen on a earlier date at the [facility]. Please call pt regarding this at [phone]

## 2018-04-02 ENCOUNTER — LAB VISIT (OUTPATIENT)
Dept: LAB | Facility: HOSPITAL | Age: 63
End: 2018-04-02
Attending: INTERNAL MEDICINE
Payer: COMMERCIAL

## 2018-04-02 DIAGNOSIS — E87.5 HYPERKALEMIA: ICD-10-CM

## 2018-04-02 LAB — POTASSIUM SERPL-SCNC: 4.2 MMOL/L

## 2018-04-02 PROCEDURE — 36415 COLL VENOUS BLD VENIPUNCTURE: CPT | Mod: PO

## 2018-04-02 PROCEDURE — 84132 ASSAY OF SERUM POTASSIUM: CPT

## 2018-04-04 ENCOUNTER — PATIENT MESSAGE (OUTPATIENT)
Dept: INTERNAL MEDICINE | Facility: CLINIC | Age: 63
End: 2018-04-04

## 2018-04-06 ENCOUNTER — PATIENT MESSAGE (OUTPATIENT)
Dept: INTERNAL MEDICINE | Facility: CLINIC | Age: 63
End: 2018-04-06

## 2018-04-10 ENCOUNTER — OFFICE VISIT (OUTPATIENT)
Dept: OBSTETRICS AND GYNECOLOGY | Facility: CLINIC | Age: 63
End: 2018-04-10
Payer: COMMERCIAL

## 2018-04-10 VITALS
SYSTOLIC BLOOD PRESSURE: 138 MMHG | HEIGHT: 63 IN | WEIGHT: 147.69 LBS | DIASTOLIC BLOOD PRESSURE: 82 MMHG | BODY MASS INDEX: 26.17 KG/M2

## 2018-04-10 DIAGNOSIS — N81.11 MIDLINE CYSTOCELE: ICD-10-CM

## 2018-04-10 DIAGNOSIS — N95.9 MENOPAUSAL AND PERIMENOPAUSAL DISORDER: ICD-10-CM

## 2018-04-10 DIAGNOSIS — Z01.419 VISIT FOR GYNECOLOGIC EXAMINATION: Primary | ICD-10-CM

## 2018-04-10 PROCEDURE — 99396 PREV VISIT EST AGE 40-64: CPT | Mod: S$GLB,,, | Performed by: OBSTETRICS & GYNECOLOGY

## 2018-04-10 PROCEDURE — 99999 PR PBB SHADOW E&M-EST. PATIENT-LVL II: CPT | Mod: PBBFAC,,, | Performed by: OBSTETRICS & GYNECOLOGY

## 2018-04-10 NOTE — Clinical Note
ADVISED HER THAT PROCEDURE WITH ADELINE WOULD BE AT [facility], [facility] ??SHIMA, SUSPENSION SINCE NO NEED FOR A SCOPE - THERE WILL BE A SIZEABLE FASCIAL INCISION . .

## 2018-04-10 NOTE — PROGRESS NOTES
HISTORY OF PRESENT ILLNESS:    Isha Leonard is a 62 y.o. female , presents for a routine exam and has no complaints.   PAP DUE NEXT YEAR AND MAMMO.   SAW CEZAR AND CONSIDERING SURGERY FOR UT FIBROIDS, PROLAPSE AND PLANNING ABDOMINOPLASTY AT THE SAME TIME.  RARE HUMZA AND MILD-MOD VAG PROLAPSE.  HAS APPT SET WITH ADELINE.  COUNSELED PT AND SON THAT DR LR CAN OPEN ABDOMEN, UROGYN CAN PERFORM AS MARIO BURT WITH RONQUILLO, SUSPENSION PROCEDURE, AND THEN PLASTIC SURGERY CAN COMPLETE THE ABDOMINOPLASTY.  DISCUSSED ELECTIVE NATURE OF BOTH PROCEDURES BUT WORTHWHILE IF SX MERIT.  ON EXAM MILD CYSTOCELE AND MILD RECTOCELE, UTERUS 12 WEEKS SIZE    LAST VISIT :  PAP DUE AND MAMMO IS UP TO DATE, LAST 2016.  NO SIGNIF HOT FLUSHES, AND RARE HUMZA.  HAS A HISTORY OF FIBROIDS AND WONDERS IF THEY ARE MAKING HER ABDOMEN DISTENDED.  ON EXAM ? 14 WEEKS SIZE BUT EXAM LIMITED - WILL REF FOR PELVIC USG TO BE SURE NO OVARIAN COMPONENT  LAST VISIT  JOSELINE:  Isha Leonard is a 58 y.o. female  presents for well woman exam. LMP: No LMP recorded. Patient is postmenopausal.. No issues, problems, or complaints.     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       Past Surgical History:   Procedure Laterality Date    BREAST BIOPSY      Left, benign    COLONOSCOPY N/A 3/3/2017    Procedure: COLONOSCOPY;  Surgeon: Mauri Richardson,  MD;  Location: [facility] ([address] [place] [address]);  Service: Endoscopy;  Laterality: N/A;  pt req Dr Richardson    COLONOSCOPY W/ POLYPECTOMY      THYROIDECTOMY, PARTIAL      secondary to thyroid nodule        MEDICATIONS AND ALLERGIES:      Current Outpatient Prescriptions:     sodium chloride (SALINE NASAL) 0.65 % nasal spray, 1 spray by Nasal route as needed for Congestion., Disp: , Rfl:     SYNTHROID 100 mcg tablet, TAKE 1 TABLET (100 MCG TOTAL) BY MOUTH ONCE DAILY., Disp: 90 tablet, Rfl: 3    loratadine (CLARITIN) 10 mg tablet, Take 1 tablet (10 mg total) by mouth daily as needed for Allergies., Disp: 30 tablet, Rfl: 12    Review of patient's allergies indicates:  No Known Allergies    Family History   Problem Relation Age of Onset    Osteoporosis Mother     Diabetes Mother     Hypertension Mother     Heart disease Mother     Cancer Father      Bladder cancer    Diabetes Sister     Breast cancer Neg Hx     Colon cancer Neg Hx     Eclampsia Neg Hx     Miscarriages / Stillbirths Neg Hx     Ovarian cancer Neg Hx      labor Neg Hx     Stroke Neg Hx     Cervical cancer Neg Hx     Endometrial cancer Neg Hx     Vaginal cancer Neg Hx        Social History     Social History    Marital status:      Spouse name: N/A    Number of children: 3    Years of education: N/A     Occupational History    Teacher      Social History Main Topics    Smoking status: Never Smoker    Smokeless tobacco: Never Used    Alcohol use No    Drug use: No    Sexual activity: Yes     Partners: Male     Birth control/ protection: Post-menopausal     Other Topics Concern    Are You Pregnant Or Think You May Be? No    Breast-Feeding No     Social History Narrative    No narrative on file       COMPREHENSIVE GYN HISTORY:  PAP History:  Denies abnormal Paps except a noted above.  Infection History: Denies STDs. Denies PID.  Benign History: Denies uterine fibroids. Denies ovarian cysts. Denies endometriosis.  Denies other  "conditions.  Cancer History: Denies cervical cancer. Denies uterine cancer or hyperplasia. Denies ovarian cancer. Denies vulvar cancer or pre-cancer. Denies vaginal cancer or pre-cancer. Denies breast cancer. Denies colon cancer.    ROS:  GENERAL: No weight changes. No swelling. No fatigue. No fever.  CARDIOVASCULAR: No chest pain. No shortness of breath. No leg cramps.   NEUROLOGICAL: No headaches. No vision changes.  BREASTS: No pain. No lumps. No discharge.  ABDOMEN: No pain. No nausea. No vomiting. No diarrhea. No constipation.  REPRODUCTIVE: No abnormal bleeding.   VULVA: No pain. No lesions. No itching.  VAGINA: No relaxation. No itching. No odor. No discharge. No lesions.  URINARY: No incontinence. No nocturia. No frequency. No dysuria.    /82   Ht 5' 3" (1.6 m)   Wt 67 kg (147 lb 11.3 oz)   BMI 26.17 kg/m²     PE:  APPEARANCE: Well nourished, well developed, in no acute distress.  AFFECT: WNL, alert and oriented x 3.  SKIN: No hirsutism or acne.  NECK: Neck symmetric without masses or thyromegaly.  NODES: No inguinal, cervical, axillary or femoral lymph node enlargement.  CHEST: Good respiratory effort.   ABDOMEN: Soft. No tenderness or masses. No hepatosplenomegaly. No hernias.  BREASTS: Symmetrical, no skin changes or visible lesions. No palpable masses, nipple discharge bilaterally.  PELVIC: ATROPHIC EXTERNAL FEMALE GENITALIA without lesions. Normal hair distribution. Adequate perineal body, normal urethral meatus. VAGINA DRY without lesions or discharge. CERVIX STENOTIC without lesions, discharge or tenderness. SECOND DEGREE cystocele AND MILD rectocele. Bimanual exam shows uterus to be 12 WEEKS size, regular, mobile and nontender. Adnexa without masses or tenderness.  EXTREMITIES: No edema.    PROCEDURES:      DIAGNOSIS:  1. Visit for gynecologic examination     2. Menopausal and perimenopausal disorder     3. Midline cystocele         PLAN:    LABS AND TESTS ORDERED:      COUNSELING:  The " patient was counseled today AS ABOVE AND on osteoporosis prevention, calcium supplementation, and regular weight bearing exercise. The patient was also counseled today on ACS PAP guidelines, with recommendations for yearly pelvic exams unless their uterus, cervix, and ovaries were removed for benign reasons; in that case, examinations every 3-5 years are recommended.  The patient was also counseled regarding monthly breast self-examination, routine STD screening for at-risk populations, prophylactic immunizations for transmitted infections such as  HPV, Pertussis, or Influenza as appropriate, and yearly mammograms when indicated by ACS guidelines.  She was advised to see her primary care physician for all other health maintenance.    FOLLOW-UP with me annually.

## 2018-05-16 ENCOUNTER — OFFICE VISIT (OUTPATIENT)
Dept: PLASTIC SURGERY | Facility: CLINIC | Age: 63
End: 2018-05-16
Payer: COMMERCIAL

## 2018-05-16 VITALS
TEMPERATURE: 98 F | WEIGHT: 150 LBS | SYSTOLIC BLOOD PRESSURE: 133 MMHG | BODY MASS INDEX: 26.58 KG/M2 | HEIGHT: 63 IN | HEART RATE: 68 BPM | DIASTOLIC BLOOD PRESSURE: 80 MMHG

## 2018-05-16 DIAGNOSIS — L98.7 EXCESS SKIN OF ABDOMEN: Primary | ICD-10-CM

## 2018-05-16 PROCEDURE — 3008F BODY MASS INDEX DOCD: CPT | Mod: CPTII,S$GLB,, | Performed by: SURGERY

## 2018-05-16 PROCEDURE — 99203 OFFICE O/P NEW LOW 30 MIN: CPT | Mod: CSM,S$GLB,, | Performed by: SURGERY

## 2018-05-16 PROCEDURE — 99999 PR PBB SHADOW E&M-EST. PATIENT-LVL III: CPT | Mod: PBBFAC,,, | Performed by: SURGERY

## 2018-05-16 NOTE — PROGRESS NOTES
History & Physical  Surgery      SUBJECTIVE:     Chief Complaint/Reason for Admission: Abdominoplasty consult    History of Present Illness: Isha Leonard is a 62 y.o. female with PMHx of uterine fibroids, stress incontinence, hypothyroidism s/p thyroidectomy and tubal ligation who presents to discuss combo case with UroGyn. She is being worked up for fibroids and it seems is moving forward with Lap Hyst/BSO. She also has a diastasis and is considering abdominoplasty at the time of her other surgeries. She denies weight gain/loss, chronic abdominal pain, N/V, or other abdominal surgeries.       Current Outpatient Prescriptions on File Prior to Visit   Medication Sig    loratadine (CLARITIN) 10 mg tablet Take 1 tablet (10 mg total) by mouth daily as needed for Allergies.    sodium chloride (SALINE NASAL) 0.65 % nasal spray 1 spray by Nasal route as needed for Congestion.    SYNTHROID 100 mcg tablet TAKE 1 TABLET (100 MCG TOTAL) BY MOUTH ONCE DAILY.     No current facility-administered medications on file prior to visit.        Review of patient's allergies indicates:  No Known Allergies    Past Medical History:   Diagnosis Date    Anemia     AR (allergic rhinitis) 2/28/2013    Bilateral renal cysts: see CT scan May 2017 5/5/2017    Colon adenoma: 2011- repeat colonoscopy 12/16 7/19/2016    Colon polyp     Diverticulosis of large intestine without hemorrhage: see CT scan May 2017 5/5/2017    Fatty liver     GERD (gastroesophageal reflux disease)     Glucose intolerance (impaired glucose tolerance)     Hyperlipidemia     Hypertension     Mild vitamin D deficiency 3/19/2018    Pneumonia     Thyroid disease     Thyroid nodule: s/p R thyroidectomy; L side wnl 2014 2/17/2014    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     Past Surgical History:   Procedure Laterality Date    BREAST BIOPSY  2008    Left, benign    COLONOSCOPY N/A 3/3/2017    Procedure: COLONOSCOPY;  Surgeon: Mauri Richardson MD;  Location: [facility]  (4TH FLR);  Service: Endoscopy;  Laterality: N/A;  pt req Dr Richardson    COLONOSCOPY W/ POLYPECTOMY      THYROIDECTOMY, PARTIAL      secondary to thyroid nodule     Family History   Problem Relation Age of Onset    Osteoporosis Mother     Diabetes Mother     Hypertension Mother     Heart disease Mother     Cancer Father         Bladder cancer    Diabetes Sister     Breast cancer Neg Hx     Colon cancer Neg Hx     Eclampsia Neg Hx     Miscarriages / Stillbirths Neg Hx     Ovarian cancer Neg Hx      labor Neg Hx     Stroke Neg Hx     Cervical cancer Neg Hx     Endometrial cancer Neg Hx     Vaginal cancer Neg Hx      Social History   Substance Use Topics    Smoking status: Never Smoker    Smokeless tobacco: Never Used    Alcohol use No        Review of Systems   Otherwise negative  OBJECTIVE:     Vital Signs (Most Recent)  Temp: 98 °F (36.7 °C) (18 1500)  Pulse: 68 (18 1500)  BP: 133/80 (18 1500)    Physical Exam  A&O, NAD  RRR   No Resp Distress  Breasts without skin lesions and symmetric   ABD: soft, small supraumbilical hernia, some excess skin     ASSESSMENT/PLAN:     A/P:  61 y/o F being worked up for Lap Hyst/BSO, possible sling, also wants Adbominoplasty at the time of surgery for small pannus and diastasis.     - provided fee estimate today for surgery  - Will d/w Dr. Salazar, would be able to proceed with abdominoplasty if the patient desires and schedule allows  - Risks, benefits, and alternatives were discussed with the patient    Mauri Fowler MD   [phone]  General Surgery [facility]

## 2018-05-16 NOTE — LETTER
May 17, 2018      Gwen Duarte MD  [address]  [facility] [address]           [facility]  [address]  [facility] [address]  Phone: [phone]  Fax: [phone]          Patient: Isha Leonard   MR Number: [id number]   YOB: 1955   Date of Visit: 5/16/2018       Dear Dr. Gwen Duarte:    Thank you for referring Isha Leonard to me for evaluation. Attached you will find relevant portions of my assessment and plan of care.    If you have questions, please do not hesitate to call me. I look forward to following Isha Leonard along with you.    Sincerely,    Benjamin Null MD    Enclosure  CC:  No Recipients    If you would like to receive this communication electronically, please contact [email or web address] or [phone] to request more information on [employer] access.    For providers and/or their staff who would like to refer a patient to [facility], please contact us through our one-stop-shop provider referral line, [facility], at [phone].    If you feel you have received this communication in error or would no longer like to receive these types of communications, please e-mail [email or web address]

## 2018-05-17 DIAGNOSIS — N39.3 STRESS INCONTINENCE IN FEMALE: Primary | ICD-10-CM

## 2018-05-18 ENCOUNTER — TELEPHONE (OUTPATIENT)
Dept: UROGYNECOLOGY | Facility: CLINIC | Age: 63
End: 2018-05-18

## 2018-05-18 NOTE — TELEPHONE ENCOUNTER
Called patient's son, Alvaro.  LMOR in reference to surgery with Dr. Salazar & Dr. Null.  Explained that an appointment for SUDs procedure and an appointment to sign consents was necessary prior to surgery. Call ended.

## 2018-05-21 ENCOUNTER — TELEPHONE (OUTPATIENT)
Dept: UROGYNECOLOGY | Facility: CLINIC | Age: 63
End: 2018-05-21

## 2018-05-21 ENCOUNTER — TELEPHONE (OUTPATIENT)
Dept: INTERNAL MEDICINE | Facility: CLINIC | Age: 63
End: 2018-05-21

## 2018-05-22 ENCOUNTER — PATIENT MESSAGE (OUTPATIENT)
Dept: INTERNAL MEDICINE | Facility: CLINIC | Age: 63
End: 2018-05-22

## 2018-05-23 ENCOUNTER — TELEPHONE (OUTPATIENT)
Dept: UROGYNECOLOGY | Facility: CLINIC | Age: 63
End: 2018-05-23

## 2018-05-23 NOTE — TELEPHONE ENCOUNTER
----- Message from Marsha Calle sent at 5/23/2018  3:35 PM CDT -----  Contact: MALLORY LING [[id number]]            Name of Who is Calling: MALLORY LING [[id number]]      What is the request in detail: pt states that she wants to reschedule her SUDS and CYSTO procedure to next week      Can the clinic reply by [facility]: no      What [facility] Back [facility]: [phone]

## 2018-05-24 DIAGNOSIS — Z01.818 PREOP TESTING: ICD-10-CM

## 2018-05-24 DIAGNOSIS — N81.11 MIDLINE CYSTOCELE: ICD-10-CM

## 2018-05-24 DIAGNOSIS — R10.2 PELVIC PRESSURE IN FEMALE: ICD-10-CM

## 2018-05-24 DIAGNOSIS — D25.9 UTERINE LEIOMYOMA, UNSPECIFIED LOCATION: Primary | ICD-10-CM

## 2018-05-25 ENCOUNTER — OFFICE VISIT (OUTPATIENT)
Dept: INTERNAL MEDICINE | Facility: CLINIC | Age: 63
End: 2018-05-25
Payer: COMMERCIAL

## 2018-05-25 ENCOUNTER — TELEPHONE (OUTPATIENT)
Dept: INTERNAL MEDICINE | Facility: CLINIC | Age: 63
End: 2018-05-25

## 2018-05-25 VITALS
SYSTOLIC BLOOD PRESSURE: 132 MMHG | WEIGHT: 147.69 LBS | HEART RATE: 80 BPM | DIASTOLIC BLOOD PRESSURE: 82 MMHG | HEIGHT: 64 IN | BODY MASS INDEX: 25.21 KG/M2

## 2018-05-25 DIAGNOSIS — Z01.810 PREOP CARDIOVASCULAR EXAM: Primary | ICD-10-CM

## 2018-05-25 DIAGNOSIS — D25.1 INTRAMURAL LEIOMYOMA OF UTERUS: ICD-10-CM

## 2018-05-25 DIAGNOSIS — E03.9 ACQUIRED HYPOTHYROIDISM: ICD-10-CM

## 2018-05-25 DIAGNOSIS — B35.3 TINEA PEDIS OF BOTH FEET: ICD-10-CM

## 2018-05-25 DIAGNOSIS — N81.11 MIDLINE CYSTOCELE: ICD-10-CM

## 2018-05-25 PROBLEM — R10.2 PELVIC PRESSURE IN FEMALE: Status: RESOLVED | Noted: 2017-02-08 | Resolved: 2018-05-25

## 2018-05-25 PROCEDURE — 3008F BODY MASS INDEX DOCD: CPT | Mod: CPTII,S$GLB,, | Performed by: INTERNAL MEDICINE

## 2018-05-25 PROCEDURE — 99999 PR PBB SHADOW E&M-EST. PATIENT-LVL IV: CPT | Mod: PBBFAC,,, | Performed by: INTERNAL MEDICINE

## 2018-05-25 PROCEDURE — 99215 OFFICE O/P EST HI 40 MIN: CPT | Mod: S$GLB,,, | Performed by: INTERNAL MEDICINE

## 2018-05-25 NOTE — PROGRESS NOTES
CHIEF COMPLAINT:  The patient is here for preoperative medical optimization prior to surgery.    SURGICAL PROCEDURE: Hysterectomy    SURGEON: Martin    HISTORY OF PRESENT ILLNESS:  The reason for the visit today as a preoperative consultation prior to surgery.    The patient does not have any active cardiac conditions (does NOT have unstable coronary artery disease, decompensated heart failure, arrhythmia or severe valvular heart disease).    The patient has an exercise capacity of greater than 4 METS without symptoms.    The patient has no clinical risk factors of prior heart failure, stroke, TIA, renal or hepatic insufficiency.  The patient has no respiratory illness.  Chronic medical issues have been stable.   Minimal sinus congestion but no fever, chills or sweats.  No cough or discolored mucus.  No GI symptoms.    PAST MEDICAL HISTORY:  Patient Active Problem List   Diagnosis    Familial hypercholesterolemia    Acquired hypothyroidism    AR (allergic rhinitis)    Glucose intolerance (impaired glucose tolerance)    Intramural leiomyoma of uterus    Gallstones: see ultrasound ; CT     Colon adenoma: - also 3/17    Fatty liver: see u/s ; stable CT     Midline cystocele    Rectus diastasis    Urinary, incontinence, stress female    Diverticulosis of large intestine without hemorrhage: see CT scan May 2017    Bilateral renal cysts: see CT scan May 2017    Osteopenia of multiple sites: see DEXA     Mild vitamin D deficiency       SOCIAL HISTORY:  Never smoked.  Rare ETOH    FAMILY HISTORY:  Family History   Problem Relation Age of Onset    Osteoporosis Mother     Diabetes Mother     Hypertension Mother     Heart disease Mother     Cancer Father         Bladder cancer    Diabetes Sister     Breast cancer Neg Hx     Colon cancer Neg Hx     Eclampsia Neg Hx     Miscarriages / Stillbirths Neg Hx     Ovarian cancer Neg Hx      labor Neg Hx     Stroke Neg Hx      Cervical cancer Neg Hx     Endometrial cancer Neg Hx     Vaginal cancer Neg Hx        MEDS AND ALLERGIES: Reviewed/reconciled as per MEDCARD and ALLERGY CARD.    REVIEW OF SYSTEMS:  GENERAL: Feels well  HEENT: No blurred vision, headache or ear pain.  CV: No chest pain, pressure, tightness or shortness of breath.  Respiratory: No cough or wheezing.  Gastrointestinal: No nausea, vomiting or diarrhea  Genitourinary: No dysuria or hematuria  Psych:  Denies depression, SI or HI.  No thought disorder.  Skin: foot fungus    PE:  HEENT: oropharynx clear.  TMs clear.  Neck: No JVD or bruits  Lungs: Clear to auscultation bilaterally  CV: Regular rate and rhythm without murmurs  Abdomen: Soft and nontender with no masses  Extremities: No edema, clubbing or cyanosis  Psych: Alert and oriented x3, no SI or HI.  Skin: Tinea pedis    DATA: Recent labs acceptable; K repeated in normal range.      ASSESSMENT:  1. Preoperative assessment and optimization  2. Fibroids  3. Hypothyroidism  4. Tinea pedis    PLAN:  1. Continue current medications, MEDCARD reconciled with the patient  2. Discontinue NSAIDs and aspirin 5-7 days prior to procedure  3. Hygienic measures; OTC antifungals; Podiatry    PREOPERATIVE RISK ASSESSMENT AND RECOMMENDATIONS:  The patient presents for medical evaluation.  She is undergoing noncardiac surgery which carries a low risk of cardiac complications.    The patient has stable cardiac conditions.  Exercise capacity is good.  She has no clinical risk factors for surgery.  She would be considered low risk for surgery.    CLEARANCE:  The patient is medically optimized for surgery.  No further testing or intervention is needed.

## 2018-05-25 NOTE — PATIENT INSTRUCTIONS
Athletes Foot    Athletes foot (tinea pedis) is caused by a fungal infection in the skin. It affects the skin between the toes, causing cracks in the skin called fissures. It can also affect the bottom of the foot where it causes dry white scales and peeling of the skin. This infection is more likely to occur when the foot is in hot, sweaty socks and shoes for long periods of time. You may feel itching and burning between your toes. This infection is treated with skin creams or medicine taken by mouth.  Home care  The following are general care guidelines:  · It is important to keep the feet dry. Use absorbent cotton socks and change them if they become sweaty. Or wear an open-toe shoe or sandal. Wash the feet at least once a day with soap and water.  · Apply the antifungal cream as prescribed. Some antifungal creams are available without a prescription.  · It may take a week before the rash starts to improve. It can take about 3 to 4 weeks to completely clear. Continue the medicine until the rash is all gone.  · Use over-the-counter antifungal powders or sprays on your feet after exposure to high-risk environments, such as public showers, gyms, and locker rooms. This can help prevent future infections. Wearing appropriate shoes in these situations can help.  Prevention  The following tips may help prevent athletes foot:  · Don't share shoes or socks with someone who has athlete's foot.  · Don't walk barefoot in places where a fungal infection can spread quickly such as locker rooms, showers, and swimming pools.  · Change socks regularly.  · Alternate shoes to assist in drying.  Follow-up care  Follow up with your healthcare provider as recommended if the rash does not improve after 10 days of treatment, or if the rash continues to spread.  When to seek medical care  Get medical attention right away if any of the following occur:  · Fever of 100.4°F (38°C) or higher, or as directed  · Increasing redness or  swelling of the foot  · Infection comes back soon after treatment  · Pus draining from cracks in the skin  Date Last Reviewed: 8/1/2016  © 7403-2473 [employer]. [address] [place] [address], [place] [address]. All rights reserved. This information is not intended as a substitute for professional medical care. Always follow your healthcare professional's instructions.        Athletes Foot    Athletes foot (tinea pedis) is caused by a fungal infection in the skin. It affects the skin between the toes, causing cracks in the skin called fissures. It can also affect the bottom of the foot where it causes dry white scales and peeling of the skin. This infection is more likely to occur when the foot is in hot, sweaty socks and shoes for long periods of time. You may feel itching and burning between your toes. This infection is treated with skin creams or medicine taken by mouth.  Home care  The following are general care guidelines:  · It is important to keep the feet dry. Use absorbent cotton socks and change them if they become sweaty. Or wear an open-toe shoe or sandal. Wash the feet at least once a day with soap and water.  · Apply the antifungal cream as prescribed. Some antifungal creams are available without a prescription.  · It may take a week before the rash starts to improve. It can take about 3 to 4 weeks to completely clear. Continue the medicine until the rash is all gone.  · Use over-the-counter antifungal powders or sprays on your feet after exposure to high-risk environments, such as public showers, gyms, and locker rooms. This can help prevent future infections. Wearing appropriate shoes in these situations can help.  Prevention  The following tips may help prevent athletes foot:  · Don't share shoes or socks with someone who has athlete's foot.  · Don't walk barefoot in places where a fungal infection can spread quickly such as locker rooms, showers, and swimming pools.  · Change socks  regularly.  · Alternate shoes to assist in drying.  Follow-up care  Follow up with your healthcare provider as recommended if the rash does not improve after 10 days of treatment, or if the rash continues to spread.  When to seek medical care  Get medical attention right away if any of the following occur:  · Fever of 100.4°F (38°C) or higher, or as directed  · Increasing redness or swelling of the foot  · Infection comes back soon after treatment  · Pus draining from cracks in the skin  Date Last Reviewed: 8/1/2016 © 2000-2017 [employer]. [address] [place] [address]. All rights reserved. This information is not intended as a substitute for professional medical care. Always follow your healthcare professional's instructions.    LOTRIMIN AF cream and powder

## 2018-05-25 NOTE — Clinical Note
Hi all  She is medically optimized for surgery.   She asks whether she is going to be having the abdominoplasty at the same time.   I was not sure.  Can your staff please communicate with her?  Thank you  Gwen Duarte

## 2018-05-28 ENCOUNTER — TELEPHONE (OUTPATIENT)
Dept: UROGYNECOLOGY | Facility: CLINIC | Age: 63
End: 2018-05-28

## 2018-05-28 NOTE — TELEPHONE ENCOUNTER
Spoke with patient.  Asked her to please call billing office about some concerns.  Also reminded her that plastics is doing abd plasty when we do POP surgery at [facility].  Advised her that I have tried to call her son, Alvaro, and her  multiple times to relay this information, as well, but they never answer the phone.

## 2018-05-28 NOTE — TELEPHONE ENCOUNTER
Attempted to contact pt's son Alvaro regarding his mother's appointments and financial obligations regarding that appointment. LM to call office.

## 2018-05-28 NOTE — TELEPHONE ENCOUNTER
I spoke to Whit in pre service she reports the pt is cleared for the procedure (SUDS) on 6/6/2018, but is unable to contact pt to inform her of her financial obligation. I also attempted to contact pt no answer. I will continue to try.

## 2018-05-29 ENCOUNTER — TELEPHONE (OUTPATIENT)
Dept: UROGYNECOLOGY | Facility: CLINIC | Age: 63
End: 2018-05-29

## 2018-05-29 NOTE — TELEPHONE ENCOUNTER
Spoke with billing that states the pt owes 3,,[phone] and they were unable to speak with her. Informed them to contact pt's son or . They states they'd give higinio a call.

## 2018-05-31 ENCOUNTER — TELEPHONE (OUTPATIENT)
Dept: UROGYNECOLOGY | Facility: CLINIC | Age: 63
End: 2018-05-31

## 2018-05-31 NOTE — TELEPHONE ENCOUNTER
Called pt on both contact numbers, no answer, Left voice message for pt to give [facility] billing deptartment a call at [phone].

## 2018-05-31 NOTE — TELEPHONE ENCOUNTER
----- Message from Georgiana Zepeda sent at 5/31/2018  3:16 PM CDT -----  Contact: Alvaro (son)  Alvaro was returning a missed call, however he said he lives out of [facility], ([place]) that you leisa's can call his parents.

## 2018-06-01 ENCOUNTER — TELEPHONE (OUTPATIENT)
Dept: UROGYNECOLOGY | Facility: CLINIC | Age: 63
End: 2018-06-01

## 2018-06-01 NOTE — TELEPHONE ENCOUNTER
FYI: Pt has called to inform our office that she is cancelling surgery scheduled on 6/11/2018 with Dr. Salazar and Dr. Null, Pt states her insurance will not cover and her out of pocket cost is to expensive.

## 2018-06-01 NOTE — TELEPHONE ENCOUNTER
Spoke to pt, she states she will cancel her upcoming procedure because of the expense, she states her insurance will not cover and she will have to come out of pocket with $1500 and states she does not have that kind of money. Pt was informed that I will let the Dr know.

## 2018-06-01 NOTE — TELEPHONE ENCOUNTER
----- Message from Paty Bernal sent at 6/1/2018 10:26 AM CDT -----  Contact: self  Pt would like to cancel her surgery.  She can be reached at [phone]

## 2018-08-03 ENCOUNTER — LAB VISIT (OUTPATIENT)
Dept: LAB | Facility: HOSPITAL | Age: 63
End: 2018-08-03
Attending: INTERNAL MEDICINE
Payer: COMMERCIAL

## 2018-08-03 DIAGNOSIS — E55.9 MILD VITAMIN D DEFICIENCY: ICD-10-CM

## 2018-08-03 LAB — 25(OH)D3+25(OH)D2 SERPL-MCNC: 32 NG/ML

## 2018-08-03 PROCEDURE — 82306 VITAMIN D 25 HYDROXY: CPT

## 2018-08-03 PROCEDURE — 36415 COLL VENOUS BLD VENIPUNCTURE: CPT | Mod: PO

## 2018-08-06 ENCOUNTER — PATIENT MESSAGE (OUTPATIENT)
Dept: INTERNAL MEDICINE | Facility: CLINIC | Age: 63
End: 2018-08-06

## 2019-04-10 ENCOUNTER — OFFICE VISIT (OUTPATIENT)
Dept: INTERNAL MEDICINE | Facility: CLINIC | Age: 64
End: 2019-04-10
Payer: COMMERCIAL

## 2019-04-10 VITALS
HEIGHT: 63 IN | BODY MASS INDEX: 26.57 KG/M2 | DIASTOLIC BLOOD PRESSURE: 82 MMHG | SYSTOLIC BLOOD PRESSURE: 132 MMHG | WEIGHT: 149.94 LBS

## 2019-04-10 DIAGNOSIS — B35.1 ONYCHOMYCOSIS: ICD-10-CM

## 2019-04-10 DIAGNOSIS — K57.30 DIVERTICULOSIS OF LARGE INTESTINE WITHOUT HEMORRHAGE: ICD-10-CM

## 2019-04-10 DIAGNOSIS — M85.89 OSTEOPENIA OF MULTIPLE SITES: ICD-10-CM

## 2019-04-10 DIAGNOSIS — R73.02 GLUCOSE INTOLERANCE (IMPAIRED GLUCOSE TOLERANCE): ICD-10-CM

## 2019-04-10 DIAGNOSIS — K80.20 GALLSTONES: ICD-10-CM

## 2019-04-10 DIAGNOSIS — Z00.00 ANNUAL PHYSICAL EXAM: Primary | ICD-10-CM

## 2019-04-10 DIAGNOSIS — K76.0 FATTY LIVER: ICD-10-CM

## 2019-04-10 DIAGNOSIS — N28.1 BILATERAL RENAL CYSTS: ICD-10-CM

## 2019-04-10 DIAGNOSIS — D12.6 COLON ADENOMA: ICD-10-CM

## 2019-04-10 DIAGNOSIS — Z12.31 SCREENING MAMMOGRAM, ENCOUNTER FOR: ICD-10-CM

## 2019-04-10 DIAGNOSIS — E03.9 ACQUIRED HYPOTHYROIDISM: ICD-10-CM

## 2019-04-10 PROCEDURE — 99396 PR PREVENTIVE VISIT,EST,40-64: ICD-10-PCS | Mod: S$GLB,,, | Performed by: INTERNAL MEDICINE

## 2019-04-10 PROCEDURE — 99999 PR PBB SHADOW E&M-EST. PATIENT-LVL IV: ICD-10-PCS | Mod: PBBFAC,,, | Performed by: INTERNAL MEDICINE

## 2019-04-10 PROCEDURE — 99999 PR PBB SHADOW E&M-EST. PATIENT-LVL IV: CPT | Mod: PBBFAC,,, | Performed by: INTERNAL MEDICINE

## 2019-04-10 PROCEDURE — 99396 PREV VISIT EST AGE 40-64: CPT | Mod: S$GLB,,, | Performed by: INTERNAL MEDICINE

## 2019-04-10 NOTE — PROGRESS NOTES
Subjective:       Patient ID: Isha Leonard is a 63 y.o. female.    Chief Complaint:  Annual exam    Some slight PND.  No cough or eye sx.  No real sneezing.  Does not like Flonase.    She's also due for follow-up lab work for her IFG as well as ultrasound for her gallstones and fatty liver.     She has had no syncope, chest pain, pressure, tightness or shortness of breath.  She denies GI symptoms.    Did not do surgery for prolapse last year.  Does not plan to do it at this time.    Trying to exercise, wants to lose 10 pounds.  Spending a lot of time with her 3 grandkids.    Patient Active Problem List   Diagnosis    Familial hypercholesterolemia    Acquired hypothyroidism    AR (allergic rhinitis)    Glucose intolerance (impaired glucose tolerance)    Intramural leiomyoma of uterus    Gallstones: see ultrasound 2014; CT 2017    Colon adenoma: 2011- also 3/17: 5 year follow up recommended    Fatty liver: see u/s 2014; stable CT 2017    Midline cystocele    Rectus diastasis    Urinary, incontinence, stress female    Diverticulosis of large intestine without hemorrhage: see CT scan May 2017    Bilateral renal cysts: see CT scan May 2017    Osteopenia of multiple sites: see DEXA 2/18    Mild vitamin D deficiency             HPI  Review of Systems   Constitutional: Negative for activity change, appetite change, chills, fatigue and fever.   HENT: Negative for congestion, hearing loss, sinus pressure and sore throat.    Eyes: Negative for visual disturbance.   Respiratory: Negative for apnea, cough, shortness of breath and wheezing.    Cardiovascular: Negative for chest pain, palpitations and leg swelling.   Gastrointestinal: Negative for abdominal distention, abdominal pain, constipation, diarrhea, nausea and vomiting.   Genitourinary: Negative for dysuria, frequency, hematuria and vaginal bleeding.   Musculoskeletal: Negative for gait problem, joint swelling and myalgias.   Skin: Negative for rash.    Neurological: Negative for dizziness, weakness, light-headedness and headaches.   Hematological: Negative for adenopathy. Does not bruise/bleed easily.   Psychiatric/Behavioral: Negative for confusion, hallucinations, sleep disturbance and suicidal ideas.       Objective:      Physical Exam   Constitutional: She is oriented to person, place, and time. She appears well-developed and well-nourished.   HENT:   Head: Normocephalic and atraumatic.   Right Ear: External ear normal.   Left Ear: External ear normal.   Nose: Nose normal.   Mouth/Throat: Oropharynx is clear and moist. No oropharyngeal exudate.   Eyes: Conjunctivae and EOM are normal. No scleral icterus.   Neck: Normal range of motion. Neck supple. No JVD present. No thyromegaly present.   Well healed thyroid scar   Cardiovascular: Normal rate, regular rhythm, normal heart sounds and intact distal pulses. Exam reveals no gallop.   No murmur heard.  Pulmonary/Chest: Effort normal and breath sounds normal. No respiratory distress. She has no wheezes.   Abdominal: Soft. Bowel sounds are normal. She exhibits no distension and no mass. There is no tenderness. There is no rebound and no guarding.   Musculoskeletal: Normal range of motion. She exhibits no edema or tenderness.   Lymphadenopathy:     She has no cervical adenopathy.   Neurological: She is alert and oriented to person, place, and time. She displays normal reflexes. No cranial nerve deficit. Coordination normal.   Skin: Skin is warm. No rash noted. No erythema.   Dystrophic great toenails c/w onychomycosis   Psychiatric: She has a normal mood and affect. Her behavior is normal. Judgment and thought content normal.   Nursing note and vitals reviewed.      Assessment:       1. Annual physical exam    2. Screening mammogram, encounter for    3. Diverticulosis of large intestine without hemorrhage: see CT scan May 2017    4. Fatty liver: see u/s 2014; stable CT 2017    5. Gallstones: see ultrasound 2014; CT  2017    6. Osteopenia of multiple sites: see DEXA 2/18    7. Acquired hypothyroidism    8. Glucose intolerance (impaired glucose tolerance)    9. Bilateral renal cysts: see CT scan May 2017    10. Onychomycosis    11. Colon adenoma: 2011- also 3/17: 5 year follow up recommended        Plan:         Isha was seen today for annual exam.    Diagnoses and all orders for this visit:    Annual physical exam  -     CBC auto differential; Future  -     Comprehensive metabolic panel; Future  -     Lipid panel; Future  -     Hemoglobin A1c; Future  -     TSH; Future  -     Vitamin D; Future    Screening mammogram, encounter for  -     Mammo Digital Screening Bilat w/ Herman; Standing    Diverticulosis of large intestine without hemorrhage: see CT scan May 2017: no sx    Fatty liver: see u/s 2014; stable CT 2017  -     US Abdomen Complete; Future    Gallstones: see ultrasound 2014; CT 2017  -     US Abdomen Complete; Future    Osteopenia of multiple sites: see DEXA 2/18: repeat 2020 or 2021    Acquired hypothyroidism: labs and review    Glucose intolerance (impaired glucose tolerance): Low carb diet, exercise, 5-10-# weight loss in next 6-12 months' time.      Bilateral renal cysts: see CT scan May 2017  -     US Abdomen Complete; Future    Onychomycosis: options reviewed.  She will consider DERM or Podiatry; declines tx now    Colon adenoma: 2011- also 3/17: 5 year follow up recommended    Call if BP > 130/80 on a regular basis.  5-10 # weight loss    Shingrix recommended  Pneumonia vaccine series

## 2019-04-16 ENCOUNTER — HOSPITAL ENCOUNTER (OUTPATIENT)
Dept: RADIOLOGY | Facility: HOSPITAL | Age: 64
Discharge: HOME | End: 2019-04-16
Attending: INTERNAL MEDICINE
Payer: COMMERCIAL

## 2019-04-16 ENCOUNTER — TELEPHONE (OUTPATIENT)
Dept: INTERNAL MEDICINE | Facility: CLINIC | Age: 64
End: 2019-04-16

## 2019-04-16 DIAGNOSIS — Z12.31 SCREENING MAMMOGRAM, ENCOUNTER FOR: ICD-10-CM

## 2019-04-16 DIAGNOSIS — E03.9 ACQUIRED HYPOTHYROIDISM: ICD-10-CM

## 2019-04-16 DIAGNOSIS — E55.9 MILD VITAMIN D DEFICIENCY: Primary | ICD-10-CM

## 2019-04-16 DIAGNOSIS — E78.01 FAMILIAL HYPERCHOLESTEROLEMIA: ICD-10-CM

## 2019-04-16 PROCEDURE — 77067 SCR MAMMO BI INCL CAD: CPT | Mod: TC

## 2019-04-16 PROCEDURE — 77067 SCR MAMMO BI INCL CAD: CPT | Mod: 26,,, | Performed by: RADIOLOGY

## 2019-04-16 PROCEDURE — 77067 MAMMO DIGITAL SCREENING BILAT WITH TOMOSYNTHESIS_CAD: ICD-10-PCS | Mod: 26,,, | Performed by: RADIOLOGY

## 2019-04-16 PROCEDURE — 77063 MAMMO DIGITAL SCREENING BILAT WITH TOMOSYNTHESIS_CAD: ICD-10-PCS | Mod: 26,,, | Performed by: RADIOLOGY

## 2019-04-16 PROCEDURE — 77063 BREAST TOMOSYNTHESIS BI: CPT | Mod: 26,,, | Performed by: RADIOLOGY

## 2019-04-16 RX ORDER — LEVOTHYROXINE SODIUM 125 UG/1
125 TABLET ORAL DAILY
Qty: 30 TABLET | Refills: 11 | Status: SHIPPED | OUTPATIENT
Start: 2019-04-16 | End: 2019-11-18 | Stop reason: SDUPTHER

## 2019-04-16 RX ORDER — VIT C/E/ZN/COPPR/LUTEIN/ZEAXAN 250MG-90MG
2000 CAPSULE ORAL DAILY
Qty: 60 CAPSULE | Refills: 12 | Status: SHIPPED | OUTPATIENT
Start: 2019-04-16 | End: 2020-06-16 | Stop reason: SDUPTHER

## 2019-04-16 NOTE — TELEPHONE ENCOUNTER
Please let her know that her thyroid is a little bit out of range we need to increase her medication.  I have sent a higher dose of her medicine to her pharmacy and would want to recheck labs in 3 months.  Cholesterol was a little higher as well but this also can be seen with the thyroid.    She should also take vitamin-D 2000 units daily over-the-counter.  Thank you

## 2019-05-07 ENCOUNTER — OFFICE VISIT (OUTPATIENT)
Dept: OBSTETRICS AND GYNECOLOGY | Facility: CLINIC | Age: 64
End: 2019-05-07
Payer: COMMERCIAL

## 2019-05-07 VITALS — WEIGHT: 149 LBS | HEIGHT: 63 IN | BODY MASS INDEX: 26.4 KG/M2

## 2019-05-07 DIAGNOSIS — Z01.419 VISIT FOR GYNECOLOGIC EXAMINATION: Primary | ICD-10-CM

## 2019-05-07 DIAGNOSIS — B37.31 YEAST VAGINITIS: ICD-10-CM

## 2019-05-07 DIAGNOSIS — N95.9 MENOPAUSAL AND PERIMENOPAUSAL DISORDER: ICD-10-CM

## 2019-05-07 PROCEDURE — 99396 PREV VISIT EST AGE 40-64: CPT | Mod: S$GLB,,, | Performed by: OBSTETRICS & GYNECOLOGY

## 2019-05-07 PROCEDURE — 87624 HPV HI-RISK TYP POOLED RSLT: CPT

## 2019-05-07 PROCEDURE — 99999 PR PBB SHADOW E&M-EST. PATIENT-LVL II: CPT | Mod: PBBFAC,,, | Performed by: OBSTETRICS & GYNECOLOGY

## 2019-05-07 PROCEDURE — 99396 PR PREVENTIVE VISIT,EST,40-64: ICD-10-PCS | Mod: S$GLB,,, | Performed by: OBSTETRICS & GYNECOLOGY

## 2019-05-07 PROCEDURE — 87480 CANDIDA DNA DIR PROBE: CPT

## 2019-05-07 PROCEDURE — 88175 CYTOPATH C/V AUTO FLUID REDO: CPT

## 2019-05-07 PROCEDURE — 87510 GARDNER VAG DNA DIR PROBE: CPT

## 2019-05-07 PROCEDURE — 99999 PR PBB SHADOW E&M-EST. PATIENT-LVL II: ICD-10-PCS | Mod: PBBFAC,,, | Performed by: OBSTETRICS & GYNECOLOGY

## 2019-05-07 RX ORDER — CLOTRIMAZOLE AND BETAMETHASONE DIPROPIONATE 10; .64 MG/G; MG/G
CREAM TOPICAL 2 TIMES DAILY
Qty: 15 G | Refills: 3 | Status: SHIPPED | OUTPATIENT
Start: 2019-05-07 | End: 2019-05-14

## 2019-05-07 NOTE — PROGRESS NOTES
It is recommended that you have a final repeat PAP smear with HPV cotesting in 1 year.     HISTORY OF PRESENT ILLNESS:    Isha Leonard is a 63 y.o. female , presents for a routine exam and has no complaints.  DECIDED AGAINST PROLAPSE SURGERY LAST YEAR.  RECENT NL MAMMO AND DUE COTEST.  C/O ITCHING VULVA - AFFIRM, DEFERS VAGINAL MEDICATION SO LOTRISONE AND IF SX PERSIST ADVISED MONISTAT 3 SUPPOS.  NO CHANGE UT SIZE    Last visit 2018:    PAP DUE NEXT YEAR AND MAMMO.   SAW CEZAR AND CONSIDERING SURGERY FOR UT FIBROIDS, PROLAPSE AND PLANNING ABDOMINOPLASTY AT THE SAME TIME.  RARE HUMZA AND MILD-MOD VAG PROLAPSE.  HAS APPT SET WITH ADELINE.  COUNSELED PT AND SON THAT DR LR CAN OPEN ABDOMEN, UROGYN CAN PERFORM AS MARIO BURT WITH RONQUILLO, SUSPENSION PROCEDURE, AND THEN PLASTIC SURGERY CAN COMPLETE THE ABDOMINOPLASTY.  DISCUSSED ELECTIVE NATURE OF BOTH PROCEDURES BUT WORTHWHILE IF SX MERIT.  ON EXAM MILD CYSTOCELE AND MILD RECTOCELE, UTERUS 12 WEEKS SIZE     LAST VISIT :  PAP DUE AND MAMMO IS UP TO DATE, LAST 2016.  NO SIGNIF HOT FLUSHES, AND RARE HUMZA.  HAS A HISTORY OF FIBROIDS AND WONDERS IF THEY ARE MAKING HER ABDOMEN DISTENDED.  ON EXAM ? 14 WEEKS SIZE BUT EXAM LIMITED - WILL REF FOR PELVIC USG TO BE SURE NO OVARIAN COMPONENT  LAST VISIT  JOSELINE:  Isha Leonard is a 58 y.o. female  presents for well woman exam. LMP: No LMP recorded. Patient is postmenopausal.. No issues, problems, or complaints.     Past Medical History:   Diagnosis Date    Anemia     AR (allergic rhinitis) 2013    Bilateral renal cysts: see CT scan May 2017 2017    Colon adenoma: - repeat colonoscopy 2016    Colon polyp     Diverticulosis of large intestine without hemorrhage: see CT scan May 2017 2017    Fatty liver     GERD (gastroesophageal reflux disease)     Glucose intolerance (impaired glucose tolerance)     Hyperlipidemia     Hypertension     Mild vitamin D  deficiency 3/19/2018    Pneumonia     Thyroid disease     Thyroid nodule: s/p R thyroidectomy; L side wnl 2014    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       Past Surgical History:   Procedure Laterality Date    BREAST BIOPSY      Left, benign    COLONOSCOPY N/A 3/3/2017    Performed [facility] at [facility] ([facility]    COLONOSCOPY W/ POLYPECTOMY      THYROIDECTOMY, PARTIAL      secondary to thyroid nodule        MEDICATIONS AND ALLERGIES:      Current Outpatient Medications:     cholecalciferol, vitamin D3, (VITAMIN D3) 1,000 unit capsule, Take 2 capsules (2,000 Units total) by mouth once daily., Disp: 60 capsule, Rfl: 12    sodium chloride (SALINE NASAL) 0.65 % nasal spray, 1 spray by Nasal route as needed for Congestion., Disp: 50 mL, Rfl: 12    clotrimazole-betamethasone 1-0.05% (LOTRISONE) cream, Apply topically 2 (two) times daily. for 7 days, Disp: 15 g, Rfl: 3    levothyroxine (SYNTHROID) 125 MCG tablet, Take 1 tablet (125 mcg total) by mouth once daily., Disp: 30 tablet, Rfl: 11    Review of patient's allergies indicates:  No Known Allergies    Family History   Problem Relation Age of Onset    Osteoporosis Mother     Diabetes Mother     Hypertension Mother     Heart disease Mother     Cancer Father         Bladder cancer    Diabetes Sister     No Known Problems Daughter     No Known Problems Son     No Known Problems Daughter     Cancer Brother     Breast cancer Neg Hx     Colon cancer Neg Hx     Eclampsia Neg Hx     Miscarriages / Stillbirths Neg Hx     Ovarian cancer Neg Hx      labor Neg Hx     Stroke Neg Hx     Cervical cancer Neg Hx     Endometrial cancer Neg Hx     Vaginal cancer Neg Hx        Social History     Socioeconomic History    Marital status:      Spouse name: Not on file    Number of children: 3    Years of education: Not on file    Highest education level: Not on file   Occupational History    Occupation: Teacher   Social  Needs    Financial resource strain: Not on file    Food insecurity:     Worry: Not on file     Inability: Not on file    Transportation needs:     Medical: Not on file     Non-medical: Not on file   Tobacco Use    Smoking status: Never Smoker    Smokeless tobacco: Never Used   Substance and Sexual Activity    Alcohol use: No    Drug use: No    Sexual activity: Yes     Partners: Male     Birth control/protection: Post-menopausal   Lifestyle    Physical activity:     Days per week: Not on file     Minutes per session: Not on file    Stress: Not on file   Relationships    Social connections:     Talks on phone: Not on file     Gets together: Not on file     Attends Druze service: Not on file     Active member of club or organization: Not on file     Attends meetings of clubs or organizations: Not on file     Relationship status: Not on file   Other Topics Concern    Are you pregnant or think you may be? No    Breast-feeding No   Social History Narrative    Not on file       COMPREHENSIVE GYN HISTORY:  PAP History:  Denies abnormal Paps except a noted above.  Infection History: Denies STDs. Denies PID.  Benign History: Denies uterine fibroids. Denies ovarian cysts. Denies endometriosis.  Denies other conditions.  Cancer History: Denies cervical cancer. Denies uterine cancer or hyperplasia. Denies ovarian cancer. Denies vulvar cancer or pre-cancer. Denies vaginal cancer or pre-cancer. Denies breast cancer. Denies colon cancer.    ROS:  GENERAL: No weight changes. No swelling. No fatigue. No fever.  CARDIOVASCULAR: No chest pain. No shortness of breath. No leg cramps.   NEUROLOGICAL: No headaches. No vision changes.  BREASTS: No pain. No lumps. No discharge.  ABDOMEN: No pain. No nausea. No vomiting. No diarrhea. No constipation.  REPRODUCTIVE: No abnormal bleeding.   VULVA: No pain. No lesions. No itching.  VAGINA: No relaxation. No itching. No odor. No discharge. No lesions.  URINARY: No incontinence.  "No nocturia. No frequency. No dysuria.    Ht 5' 3" (1.6 m)   Wt 67.6 kg (149 lb)   BMI 26.39 kg/m²     PE:  APPEARANCE: Well nourished, well developed, in no acute distress.  AFFECT: WNL, alert and oriented x 3.  SKIN: No hirsutism or acne.  NECK: Neck symmetric without masses or thyromegaly.  NODES: No inguinal, cervical, axillary or femoral lymph node enlargement.  CHEST: Good respiratory effort.   ABDOMEN: Soft. No tenderness or masses. No hepatosplenomegaly. No hernias.  BREASTS: Symmetrical, no skin changes or visible lesions. No palpable masses, nipple discharge bilaterally.  PELVIC: ATROPHIC EXTERNAL FEMALE GENITALIA without lesions. Normal hair distribution. Adequate perineal body, normal urethral meatus. VAGINA DRY without lesions or discharge. CERVIX STENOTIC without lesions, discharge or tenderness. No significant cystocele or rectocele. Bimanual exam shows uterus to be 12 WEEKS size, regular, mobile and nontender. Adnexa without masses or tenderness.  EXTREMITIES: No edema.    PROCEDURES:  Pap    DIAGNOSIS:  1. Visit for gynecologic examination  Liquid-based pap smear, screening    HPV High Risk Genotypes, PCR   2. Menopausal and perimenopausal disorder     3. Yeast vaginitis         PLAN:    LABS AND TESTS ORDERED:  Mammogram    COUNSELING:  The patient was counseled today on osteoporosis prevention, calcium supplementation, and regular weight bearing exercise. The patient was also counseled today on ACS PAP guidelines, with recommendations for yearly pelvic exams unless their uterus, cervix, and ovaries were removed for benign reasons; in that case, examinations every 3-5 years are recommended.  The patient was also counseled regarding monthly breast self-examination, routine STD screening for at-risk populations, prophylactic immunizations for transmitted infections such as  HPV, Pertussis, or Influenza as appropriate, and yearly mammograms when indicated by ACS guidelines.  She was advised to see " her primary care physician for all other health maintenance.    FOLLOW-UP with me annually.

## 2019-05-08 LAB
BACTERIAL VAGINOSIS DNA: NEGATIVE
CANDIDA GLABRATA DNA: NEGATIVE
CANDIDA KRUSEI DNA: NEGATIVE
CANDIDA RRNA VAG QL PROBE: NEGATIVE
T VAGINALIS RRNA GENITAL QL PROBE: NEGATIVE

## 2019-05-09 LAB
HPV HR 12 DNA CVX QL NAA+PROBE: NEGATIVE
HPV16 AG SPEC QL: NEGATIVE
HPV18 DNA SPEC QL NAA+PROBE: NEGATIVE

## 2019-05-12 ENCOUNTER — PATIENT MESSAGE (OUTPATIENT)
Dept: OBSTETRICS AND GYNECOLOGY | Facility: CLINIC | Age: 64
End: 2019-05-12

## 2019-05-27 ENCOUNTER — OFFICE VISIT (OUTPATIENT)
Dept: INTERNAL MEDICINE | Facility: CLINIC | Age: 64
End: 2019-05-27
Payer: COMMERCIAL

## 2019-05-27 VITALS
DIASTOLIC BLOOD PRESSURE: 80 MMHG | SYSTOLIC BLOOD PRESSURE: 132 MMHG | HEIGHT: 64 IN | BODY MASS INDEX: 25.97 KG/M2 | WEIGHT: 152.13 LBS

## 2019-05-27 DIAGNOSIS — J01.90 ACUTE BACTERIAL SINUSITIS: Primary | ICD-10-CM

## 2019-05-27 DIAGNOSIS — B96.89 ACUTE BACTERIAL SINUSITIS: Primary | ICD-10-CM

## 2019-05-27 DIAGNOSIS — B35.1 ONYCHOMYCOSIS: ICD-10-CM

## 2019-05-27 PROCEDURE — 1101F PT FALLS ASSESS-DOCD LE1/YR: CPT | Mod: CPTII,S$GLB,, | Performed by: INTERNAL MEDICINE

## 2019-05-27 PROCEDURE — 99214 OFFICE O/P EST MOD 30 MIN: CPT | Mod: S$GLB,,, | Performed by: INTERNAL MEDICINE

## 2019-05-27 PROCEDURE — 99999 PR PBB SHADOW E&M-EST. PATIENT-LVL IV: CPT | Mod: PBBFAC,,, | Performed by: INTERNAL MEDICINE

## 2019-05-27 PROCEDURE — 3008F BODY MASS INDEX DOCD: CPT | Mod: CPTII,S$GLB,, | Performed by: INTERNAL MEDICINE

## 2019-05-27 PROCEDURE — 1101F PR PT FALLS ASSESS DOC 0-1 FALLS W/OUT INJ PAST YR: ICD-10-PCS | Mod: CPTII,S$GLB,, | Performed by: INTERNAL MEDICINE

## 2019-05-27 PROCEDURE — 99214 PR OFFICE/OUTPT VISIT, EST, LEVL IV, 30-39 MIN: ICD-10-PCS | Mod: S$GLB,,, | Performed by: INTERNAL MEDICINE

## 2019-05-27 PROCEDURE — 3008F PR BODY MASS INDEX (BMI) DOCUMENTED: ICD-10-PCS | Mod: CPTII,S$GLB,, | Performed by: INTERNAL MEDICINE

## 2019-05-27 PROCEDURE — 99999 PR PBB SHADOW E&M-EST. PATIENT-LVL IV: ICD-10-PCS | Mod: PBBFAC,,, | Performed by: INTERNAL MEDICINE

## 2019-05-27 RX ORDER — AMOXICILLIN AND CLAVULANATE POTASSIUM 875; 125 MG/1; MG/1
1 TABLET, FILM COATED ORAL 2 TIMES DAILY
Qty: 20 TABLET | Refills: 0 | Status: SHIPPED | OUTPATIENT
Start: 2019-05-27 | End: 2020-01-15

## 2019-05-27 NOTE — PATIENT INSTRUCTIONS

## 2019-07-19 ENCOUNTER — OFFICE VISIT (OUTPATIENT)
Dept: PODIATRY | Facility: CLINIC | Age: 64
End: 2019-07-19
Payer: COMMERCIAL

## 2019-07-19 VITALS
WEIGHT: 152 LBS | HEART RATE: 62 BPM | HEIGHT: 64 IN | SYSTOLIC BLOOD PRESSURE: 136 MMHG | DIASTOLIC BLOOD PRESSURE: 81 MMHG | BODY MASS INDEX: 25.95 KG/M2

## 2019-07-19 DIAGNOSIS — L60.9 DISEASE OF NAIL: Primary | ICD-10-CM

## 2019-07-19 DIAGNOSIS — B35.1 ONYCHOMYCOSIS DUE TO DERMATOPHYTE: ICD-10-CM

## 2019-07-19 PROCEDURE — 3008F PR BODY MASS INDEX (BMI) DOCUMENTED: ICD-10-PCS | Mod: CPTII,S$GLB,, | Performed by: PODIATRIST

## 2019-07-19 PROCEDURE — 3008F BODY MASS INDEX DOCD: CPT | Mod: CPTII,S$GLB,, | Performed by: PODIATRIST

## 2019-07-19 PROCEDURE — 99203 OFFICE O/P NEW LOW 30 MIN: CPT | Mod: S$GLB,,, | Performed by: PODIATRIST

## 2019-07-19 PROCEDURE — 99203 PR OFFICE/OUTPT VISIT, NEW, LEVL III, 30-44 MIN: ICD-10-PCS | Mod: S$GLB,,, | Performed by: PODIATRIST

## 2019-07-19 PROCEDURE — 99999 PR PBB SHADOW E&M-EST. PATIENT-LVL III: ICD-10-PCS | Mod: PBBFAC,,, | Performed by: PODIATRIST

## 2019-07-19 PROCEDURE — 1101F PT FALLS ASSESS-DOCD LE1/YR: CPT | Mod: CPTII,S$GLB,, | Performed by: PODIATRIST

## 2019-07-19 PROCEDURE — 99999 PR PBB SHADOW E&M-EST. PATIENT-LVL III: CPT | Mod: PBBFAC,,, | Performed by: PODIATRIST

## 2019-07-19 PROCEDURE — 1101F PR PT FALLS ASSESS DOC 0-1 FALLS W/OUT INJ PAST YR: ICD-10-PCS | Mod: CPTII,S$GLB,, | Performed by: PODIATRIST

## 2019-07-19 RX ORDER — AMMONIUM LACTATE 12 G/100G
CREAM TOPICAL
Qty: 140 G | Refills: 11 | Status: SHIPPED | OUTPATIENT
Start: 2019-07-19 | End: 2022-03-08

## 2019-07-19 RX ORDER — KETOCONAZOLE 20 MG/G
CREAM TOPICAL DAILY
Qty: 1 TUBE | Refills: 2 | Status: SHIPPED | OUTPATIENT
Start: 2019-07-19 | End: 2020-07-04

## 2019-07-19 NOTE — LETTER
July 24, 2019      Gwen Duarte MD  [address]  [place] [address]           [facility] - Podiatry  [address]  [place] [address]  Phone: [phone]          Patient: Isha Leonard   MR Number: [id number]   YOB: 1955   Date of Visit: 7/19/2019       Dear Dr. Gwen Duarte:    Thank you for referring Isha Leonard to me for evaluation. Attached you will find relevant portions of my assessment and plan of care.    If you have questions, please do not hesitate to call me. I look forward to following Isha Leonard along with you.    Sincerely,    Hai Jenkins  CC:  No Recipients    If you would like to receive this communication electronically, please contact [email or web address] or [phone] to request more information on [facility] Link access.    For providers and/or their staff who would like to refer a patient to [facility], please contact us through our one-stop-shop provider referral line, [facility], at [phone].    If you feel you have received this communication in error or would no longer like to receive these types of communications, please e-mail [email or web address]

## 2019-07-24 NOTE — PROGRESS NOTES
Subjective:      Patient ID: Isha Leonard is a 64 y.o. female.    Chief Complaint: Nail Problem (b/l)    Isha is a 64 y.o. female who presents to the clinic complaining of thick and discolored toenails on both feet. Isha is inquiring about treatment options.      Review of Systems   Constitution: Negative for chills and fever.   HENT: Negative for congestion and tinnitus.    Eyes: Negative for double vision and visual disturbance.   Cardiovascular: Negative for chest pain and claudication.   Respiratory: Negative for hemoptysis and shortness of breath.    Endocrine: Negative for cold intolerance and heat intolerance.   Hematologic/Lymphatic: Negative for adenopathy and bleeding problem.   Skin: Positive for color change, dry skin and nail changes.   Musculoskeletal: Negative for myalgias and stiffness.   Gastrointestinal: Negative for nausea and vomiting.   Genitourinary: Negative for dysuria and hematuria.   Neurological: Negative for numbness and paresthesias.   Psychiatric/Behavioral: Negative for altered mental status and suicidal ideas.   Allergic/Immunologic: Negative for environmental allergies and persistent infections.           Objective:      Physical Exam   Constitutional: She is oriented to person, place, and time. She appears well-developed and well-nourished.   Cardiovascular:   Pulses:       Dorsalis pedis pulses are 2+ on the right side, and 2+ on the left side.        Posterior tibial pulses are 2+ on the right side, and 2+ on the left side.   Pulmonary/Chest: Effort normal.   Musculoskeletal: Normal range of motion.   Inspection and palpation of the muscles joints and bones of both lower extremities reveal that muscle strength for the anterior lateral and posterior muscle groups and intrinsic muscle groups of the foot are all 5 over 5 symmetrical.  Ankle subtalar midtarsal and digital joint range of motion are within normal limits, nonpainful, without crepitus or effusion.  Patient exhibits a normal  angle and base of gait.  Palpation of the tendons reveal no defects.   Neurological: She is alert and oriented to person, place, and time.   Sharp dull light touch vibratory proprioceptive sensation are intact bilaterally.  Deep tendon reflexes to patellar and Achilles tendon are symmetrical 2 over 4 bilaterally.  No ankle clonus or Babinski reflexes noted bilaterally.  Coordination is normal to both feet and lower extremities.   Skin: Skin is warm and dry. Capillary refill takes 2 to 3 seconds.   Skin turgor is normal bilaterally.  Skin texture is well hydrated to both lower extremities.  No lesions or rashes or wounds appreciated bilaterally.  Bilateral hallux nails mildly thickened and discolored.  Mild tenderness to palpation noted. No signs of incurvation.  Bilateral dryness noted to plantar aspects of both feet.   Psychiatric: She has a normal mood and affect.   Vitals reviewed.            Assessment:       Encounter Diagnoses   Name Primary?    Disease of nail Yes    Onychomycosis due to dermatophyte          Plan:       Isha was seen today for nail problem.    Diagnoses and all orders for this visit:    Disease of nail    Onychomycosis due to dermatophyte    Other orders  -     ketoconazole (NIZORAL) 2 % cream; Apply topically once daily.  -     ammonium lactate 12 % Crea; Apply twice daily to affected parts both feet as needed.      I counseled the patient on her conditions, their implications and medical management.      Begin topical antifungal at this point.  Conservative and surgical options were discussed.  Oral medications were discussed as well.  Follow-up in 3-6 months and we will discuss further options if needed.  .

## 2019-07-25 ENCOUNTER — TELEPHONE (OUTPATIENT)
Dept: INTERNAL MEDICINE | Facility: CLINIC | Age: 64
End: 2019-07-25

## 2019-07-25 NOTE — TELEPHONE ENCOUNTER
She no showed labs for July 19th, please contact her to reschedule everything:  TSH, vitamin-D and lipids thanks

## 2019-08-19 ENCOUNTER — LAB VISIT (OUTPATIENT)
Dept: LAB | Facility: HOSPITAL | Age: 64
End: 2019-08-19
Attending: INTERNAL MEDICINE
Payer: COMMERCIAL

## 2019-08-19 DIAGNOSIS — E03.9 ACQUIRED HYPOTHYROIDISM: ICD-10-CM

## 2019-08-19 DIAGNOSIS — E55.9 MILD VITAMIN D DEFICIENCY: ICD-10-CM

## 2019-08-19 LAB — 25(OH)D3+25(OH)D2 SERPL-MCNC: 26 NG/ML (ref 30–96)

## 2019-08-19 PROCEDURE — 82306 VITAMIN D 25 HYDROXY: CPT

## 2019-08-19 PROCEDURE — 36415 COLL VENOUS BLD VENIPUNCTURE: CPT | Mod: PO

## 2019-08-19 PROCEDURE — 84443 ASSAY THYROID STIM HORMONE: CPT

## 2019-08-20 ENCOUNTER — PATIENT MESSAGE (OUTPATIENT)
Dept: INTERNAL MEDICINE | Facility: CLINIC | Age: 64
End: 2019-08-20

## 2019-08-20 LAB — TSH SERPL DL<=0.005 MIU/L-ACNC: 0.79 UIU/ML (ref 0.4–4)

## 2019-08-27 ENCOUNTER — TELEPHONE (OUTPATIENT)
Dept: PODIATRY | Facility: CLINIC | Age: 64
End: 2019-08-27

## 2019-08-27 NOTE — TELEPHONE ENCOUNTER
Nurse spoke with  regarding 10/25 appt with Dr. Aj. Mr. Leonard notified nurse that they are currently out of town but will reschedule via [facility] Or call at a later time.  
What Is The Reason For Today's Visit?: Skin Lesions

## 2019-09-10 ENCOUNTER — PATIENT MESSAGE (OUTPATIENT)
Dept: INTERNAL MEDICINE | Facility: CLINIC | Age: 64
End: 2019-09-10

## 2019-09-10 ENCOUNTER — TELEPHONE (OUTPATIENT)
Dept: INTERNAL MEDICINE | Facility: CLINIC | Age: 64
End: 2019-09-10

## 2019-09-10 ENCOUNTER — HOSPITAL ENCOUNTER (OUTPATIENT)
Dept: RADIOLOGY | Facility: HOSPITAL | Age: 64
Discharge: HOME | End: 2019-09-10
Attending: INTERNAL MEDICINE
Payer: COMMERCIAL

## 2019-09-10 DIAGNOSIS — K80.20 GALLSTONES: ICD-10-CM

## 2019-09-10 DIAGNOSIS — K76.0 FATTY LIVER: ICD-10-CM

## 2019-09-10 DIAGNOSIS — N28.1 BILATERAL RENAL CYSTS: ICD-10-CM

## 2019-09-10 DIAGNOSIS — K76.0 FATTY LIVER: Primary | ICD-10-CM

## 2019-09-10 PROCEDURE — 76700 US ABDOMEN COMPLETE: ICD-10-PCS | Mod: 26,,, | Performed by: RADIOLOGY

## 2019-09-10 PROCEDURE — 76700 US EXAM ABDOM COMPLETE: CPT | Mod: TC

## 2019-09-10 PROCEDURE — 76700 US EXAM ABDOM COMPLETE: CPT | Mod: 26,,, | Performed by: RADIOLOGY

## 2019-09-10 NOTE — TELEPHONE ENCOUNTER
I have attempted without success to contact this patient by phone to schedule hepatology appt. Left pt voicemail with hepatology phone number and instructions to give them a call, as well as instructions to call clinic back with any questions or concerns.

## 2019-09-10 NOTE — TELEPHONE ENCOUNTER
Ultrasound shows fatty liver and gallstones as before    I am recommending a Hepatology consult, orders in; please let me know when scheduled thanks

## 2019-09-11 ENCOUNTER — DOCUMENTATION ONLY (OUTPATIENT)
Dept: TRANSPLANT | Facility: CLINIC | Age: 64
End: 2019-09-11

## 2019-09-11 NOTE — NURSING
Pt records reviewed.   Pt will be referred to Hepatology.    Initial referral received  from the workque.   Referring Provider/diagnosis  GWEN DUARTE Provider: Gwen Duarte MD   Diagnosis: Fatty liver  Gallstones            Referral letter sent to patient.

## 2019-09-11 NOTE — LETTER
September 11, 2019    Isha Leonard      Dear Dr. Leonard    Patient: Isha Leonard   MR Number: [id number]   YOB: 1955     Thank you for the referral of Isha Leonard to the [facility]. An initial appointment will be scheduled for your patient with one of our Hepatologists.      Thank you again for your trust in our program.  If there is anything we can do for you or your staff, please feel free to contact us.        Sincerely,        [facility]  [address] [place] [id number]  [phone]

## 2019-09-11 NOTE — LETTER
September 11, 2019    Isha Leonard  [address] [facility] [address]      Dear Isha Leonard:    Your doctor has referred you to the [facility]. We are sending this letter to remind you to make an appointment with us to complete the referral process.     Please call us at [phone] to schedule an appointment. We look forward to seeing you soon.     If you received a call and have been scheduled, please disregard this letter.       Sincerely,        [facility]   [facility] [place], [place] [id number] [phone]

## 2019-10-04 ENCOUNTER — PATIENT MESSAGE (OUTPATIENT)
Dept: INTERNAL MEDICINE | Facility: CLINIC | Age: 64
End: 2019-10-04

## 2019-10-15 ENCOUNTER — OFFICE VISIT (OUTPATIENT)
Dept: OBSTETRICS AND GYNECOLOGY | Facility: CLINIC | Age: 64
End: 2019-10-15
Payer: COMMERCIAL

## 2019-10-15 ENCOUNTER — IMMUNIZATION (OUTPATIENT)
Dept: PHARMACY | Facility: CLINIC | Age: 64
End: 2019-10-15
Payer: COMMERCIAL

## 2019-10-15 VITALS
WEIGHT: 152.56 LBS | SYSTOLIC BLOOD PRESSURE: 136 MMHG | BODY MASS INDEX: 26.05 KG/M2 | HEIGHT: 64 IN | DIASTOLIC BLOOD PRESSURE: 81 MMHG

## 2019-10-15 DIAGNOSIS — N81.2 CYSTOCELE AND RECTOCELE WITH INCOMPLETE UTEROVAGINAL PROLAPSE: Primary | ICD-10-CM

## 2019-10-15 DIAGNOSIS — D25.9 UTERINE LEIOMYOMA, UNSPECIFIED LOCATION: ICD-10-CM

## 2019-10-15 PROCEDURE — 1101F PT FALLS ASSESS-DOCD LE1/YR: CPT | Mod: CPTII,S$GLB,, | Performed by: OBSTETRICS & GYNECOLOGY

## 2019-10-15 PROCEDURE — 99214 OFFICE O/P EST MOD 30 MIN: CPT | Mod: S$GLB,,, | Performed by: OBSTETRICS & GYNECOLOGY

## 2019-10-15 PROCEDURE — 3008F BODY MASS INDEX DOCD: CPT | Mod: CPTII,S$GLB,, | Performed by: OBSTETRICS & GYNECOLOGY

## 2019-10-15 PROCEDURE — 1101F PR PT FALLS ASSESS DOC 0-1 FALLS W/OUT INJ PAST YR: ICD-10-PCS | Mod: CPTII,S$GLB,, | Performed by: OBSTETRICS & GYNECOLOGY

## 2019-10-15 PROCEDURE — 99999 PR PBB SHADOW E&M-EST. PATIENT-LVL III: CPT | Mod: PBBFAC,,, | Performed by: OBSTETRICS & GYNECOLOGY

## 2019-10-15 PROCEDURE — 99214 PR OFFICE/OUTPT VISIT, EST, LEVL IV, 30-39 MIN: ICD-10-PCS | Mod: S$GLB,,, | Performed by: OBSTETRICS & GYNECOLOGY

## 2019-10-15 PROCEDURE — 3008F PR BODY MASS INDEX (BMI) DOCUMENTED: ICD-10-PCS | Mod: CPTII,S$GLB,, | Performed by: OBSTETRICS & GYNECOLOGY

## 2019-10-15 PROCEDURE — 99999 PR PBB SHADOW E&M-EST. PATIENT-LVL III: ICD-10-PCS | Mod: PBBFAC,,, | Performed by: OBSTETRICS & GYNECOLOGY

## 2019-10-15 NOTE — PROGRESS NOTES
HISTORY OF PRESENT ILLNESS:    Isha Leonard is a 64 y.o. female , presents, WANTS CHECK OF FIBROID AND BLADDER.  DENIES PROBLEMS, ASKING ABOUT PESSARY AND ADVISED AGAINST UNLESS SYMPTOMATIC.  SINCE STABLE COULD CHECK [facility] FOR FIBROID, BUT WOULD NOT RECOMMEND INTERVENTION AT THIS POINT    LAST VISIT 2018:  DECIDED AGAINST PROLAPSE SURGERY LAST YEAR.  RECENT NL MAMMO AND DUE COTEST.  C/O ITCHING VULVA - AFFIRM, DEFERS VAGINAL MEDICATION SO LOTRISONE AND IF SX PERSIST ADVISED MONISTAT 3 SUPPOS.  NO CHANGE UT SIZE  Last visit 2018:    PAP DUE NEXT YEAR AND MAMMO.   SAW KNOEEUSEBIA AND CONSIDERING SURGERY FOR UT FIBROIDS, PROLAPSE AND PLANNING ABDOMINOPLASTY AT THE SAME TIME.  RARE HUMZA AND MILD-MOD VAG PROLAPSE.  HAS APPT SET WITH [facility].  COUNSELED PT AND SON THAT DR LR CAN OPEN ABDOMEN, UROGYN CAN PERFORM AS EMILIA KIM, MARIO WITH RONQUILLO, SUSPENSION PROCEDURE, AND THEN PLASTIC SURGERY CAN COMPLETE THE ABDOMINOPLASTY.  DISCUSSED ELECTIVE NATURE OF BOTH PROCEDURES BUT WORTHWHILE IF SX MERIT.  ON EXAM MILD CYSTOCELE AND MILD RECTOCELE, UTERUS 12 WEEKS SIZE  LAST VISIT 2016:  PAP DUE AND MAMMO IS UP TO DATE, LAST 2016.  NO SIGNIF HOT FLUSHES, AND RARE HUMZA.  HAS A HISTORY OF FIBROIDS AND WONDERS IF THEY ARE MAKING HER ABDOMEN DISTENDED.  ON EXAM ? 14 WEEKS SIZE BUT EXAM LIMITED - WILL REF FOR PELVIC USG TO BE SURE NO OVARIAN COMPONENT  LAST VISIT  JOSELINE:  Isha Leonard is a 58 y.o. female  presents for well woman exam. LMP: No LMP recorded. Patient is postmenopausal.. No issues, problems, or complaints.     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       Past Surgical History:   Procedure Laterality Date    BREAST BIOPSY  2008    Left, benign    COLONOSCOPY N/A 3/3/2017    Procedure: COLONOSCOPY;  Surgeon: Mauri Richardson MD;  Location: [address]);  Service: Endoscopy;  Laterality: N/A;  pt req Dr Richardson    COLONOSCOPY W/ POLYPECTOMY      THYROIDECTOMY, PARTIAL      secondary to thyroid nodule        MEDICATIONS AND ALLERGIES:      Current Outpatient Medications:     ammonium lactate 12 % Crea, Apply twice daily to affected parts both feet as needed., Disp: 140 g, Rfl: 11    amoxicillin-clavulanate 875-125mg (AUGMENTIN) 875-125 mg per tablet, Take 1 tablet by mouth 2 (two) times daily., Disp: 20 tablet, Rfl: 0    cholecalciferol, vitamin D3, (VITAMIN D3) 1,000 unit capsule, Take 2 capsules (2,000 Units total) by mouth once daily., Disp: 60 capsule, Rfl: 12    ketoconazole (NIZORAL) 2 % cream, Apply topically once daily., Disp: 1 Tube, Rfl: 2    levothyroxine (SYNTHROID) 125 MCG tablet, Take 1 tablet (125 mcg total) by mouth once daily., Disp: 30 tablet, Rfl: 11    sodium chloride (SALINE NASAL) 0.65 % nasal spray, 1 spray by Nasal route as needed for Congestion., Disp: 50 mL, Rfl: 12    Review of patient's allergies indicates:  No Known Allergies    Family History   Problem Relation Age of Onset    Osteoporosis Mother     Diabetes Mother     Hypertension Mother     Heart disease Mother     Cancer Father         Bladder cancer    Diabetes Sister     No Known Problems Daughter     No Known Problems Son     No Known Problems Daughter     Cancer Brother     Breast cancer Neg Hx     Colon cancer Neg Hx     Eclampsia Neg Hx     Miscarriages / Stillbirths Neg Hx     Ovarian cancer Neg Hx       labor Neg Hx     Stroke Neg Hx     Cervical cancer Neg Hx     Endometrial cancer Neg Hx     Vaginal cancer Neg Hx        Social History     Socioeconomic History    Marital status:      Spouse name: Not on file    Number of children: 3    Years of education: Not on file    Highest education level: Not on file   Occupational History    Occupation: Teacher   Social Needs    Financial resource strain: Not on file    Food insecurity:     Worry: Not on file     Inability: Not on file    Transportation needs:     Medical: Not on file     Non-medical: Not on file   Tobacco Use    Smoking status: Never Smoker    Smokeless tobacco: Never Used   Substance and Sexual Activity    Alcohol use: No    Drug use: No    Sexual activity: Yes     Partners: Male     Birth control/protection: Post-menopausal   Lifestyle    Physical activity:     Days per week: Not on file     Minutes per session: Not on file    Stress: Not on file   Relationships    Social connections:     Talks on phone: Not on file     Gets together: Not on file     Attends Hinduism service: Not on file     Active member of club or organization: Not on file     Attends meetings of clubs or organizations: Not on file     Relationship status: Not on file   Other Topics Concern    Are you pregnant or think you may be? No    Breast-feeding No   Social History Narrative    Not on file       COMPREHENSIVE GYN HISTORY:  PAP History:  Denies abnormal Paps except a noted above.  Infection History: Denies STDs. Denies PID.  Benign History: Denies uterine fibroids. Denies ovarian cysts. Denies endometriosis.  Denies other conditions.  Cancer History: Denies cervical cancer. Denies uterine cancer or hyperplasia. Denies ovarian cancer. Denies vulvar cancer or pre-cancer. Denies vaginal cancer or pre-cancer. Denies breast cancer. Denies colon cancer.    ROS:  GENERAL: No weight changes. No swelling. No fatigue. No fever.  CARDIOVASCULAR: No  "chest pain. No shortness of breath. No leg cramps.   NEUROLOGICAL: No headaches. No vision changes.  BREASTS: No pain. No lumps. No discharge.  ABDOMEN: No pain. No nausea. No vomiting. No diarrhea. No constipation.  REPRODUCTIVE: No abnormal bleeding.   VULVA: No pain. No lesions. No itching.  VAGINA: No relaxation. No itching. No odor. No discharge. No lesions.  URINARY: No incontinence. No nocturia. No frequency. No dysuria.    /81   Ht 5' 4" (1.626 m)   Wt 69.2 kg (152 lb 8.9 oz)   BMI 26.19 kg/m²     PE:  APPEARANCE: Well nourished, well developed, in no acute distress.  AFFECT: WNL, alert and oriented x 3.  CHEST: Good respiratory effort.   ABDOMEN: Soft. No tenderness or masses. No hepatosplenomegaly. No hernias.  BREASTS: DEFERRED  PELVIC: ATROPHIC EXTERNAL FEMALE GENITALIA without lesions. Normal hair distribution. Adequate perineal body, normal urethral meatus. VAGINA DRY without lesions or discharge. CERVIX STENOTIC without lesions, discharge or tenderness.  SECOND DEGREE CYSTOCELE AND MILD RECTOCELE, UTERUS 12 WEEKS SIZE, regular, mobile and nontender. Adnexa without masses or tenderness.  EXTREMITIES: No edema.    DIAGNOSIS:  1. Cystocele and rectocele with incomplete uterovaginal prolapse     2. Uterine leiomyoma, unspecified location  US Pelvis Comp with Transvag NON-OB (xpd       PLAN:    COUNSELING:  The patient was counseled today on .    FOLLOW-UP with me annually.   "

## 2019-11-18 RX ORDER — LEVOTHYROXINE SODIUM 125 UG/1
125 TABLET ORAL DAILY
Qty: 90 TABLET | Refills: 3 | Status: SHIPPED | OUTPATIENT
Start: 2019-11-18 | End: 2020-11-19 | Stop reason: SDUPTHER

## 2019-11-19 ENCOUNTER — OFFICE VISIT (OUTPATIENT)
Dept: HEPATOLOGY | Facility: CLINIC | Age: 64
End: 2019-11-19
Attending: INTERNAL MEDICINE
Payer: COMMERCIAL

## 2019-11-19 ENCOUNTER — OFFICE VISIT (OUTPATIENT)
Dept: INTERNAL MEDICINE | Facility: CLINIC | Age: 64
End: 2019-11-19
Payer: COMMERCIAL

## 2019-11-19 VITALS
HEART RATE: 84 BPM | TEMPERATURE: 98 F | DIASTOLIC BLOOD PRESSURE: 88 MMHG | WEIGHT: 154.56 LBS | OXYGEN SATURATION: 97 % | SYSTOLIC BLOOD PRESSURE: 126 MMHG | BODY MASS INDEX: 27.39 KG/M2 | HEIGHT: 63 IN

## 2019-11-19 VITALS
SYSTOLIC BLOOD PRESSURE: 133 MMHG | DIASTOLIC BLOOD PRESSURE: 80 MMHG | RESPIRATION RATE: 18 BRPM | OXYGEN SATURATION: 96 % | HEIGHT: 63 IN | BODY MASS INDEX: 27.57 KG/M2 | HEART RATE: 84 BPM | WEIGHT: 155.63 LBS

## 2019-11-19 DIAGNOSIS — K76.0 FATTY LIVER: ICD-10-CM

## 2019-11-19 DIAGNOSIS — K13.21 LEUKOPLAKIA, TONGUE: ICD-10-CM

## 2019-11-19 DIAGNOSIS — J02.9 PHARYNGITIS, UNSPECIFIED ETIOLOGY: Primary | ICD-10-CM

## 2019-11-19 DIAGNOSIS — K76.0 FATTY LIVER: Primary | ICD-10-CM

## 2019-11-19 DIAGNOSIS — E78.49 OTHER HYPERLIPIDEMIA: ICD-10-CM

## 2019-11-19 PROCEDURE — 1101F PT FALLS ASSESS-DOCD LE1/YR: CPT | Mod: CPTII,S$GLB,, | Performed by: INTERNAL MEDICINE

## 2019-11-19 PROCEDURE — 3008F BODY MASS INDEX DOCD: CPT | Mod: CPTII,S$GLB,, | Performed by: INTERNAL MEDICINE

## 2019-11-19 PROCEDURE — 99999 PR PBB SHADOW E&M-EST. PATIENT-LVL III: CPT | Mod: PBBFAC,,, | Performed by: INTERNAL MEDICINE

## 2019-11-19 PROCEDURE — 3008F PR BODY MASS INDEX (BMI) DOCUMENTED: ICD-10-PCS | Mod: CPTII,S$GLB,, | Performed by: INTERNAL MEDICINE

## 2019-11-19 PROCEDURE — 1101F PR PT FALLS ASSESS DOC 0-1 FALLS W/OUT INJ PAST YR: ICD-10-PCS | Mod: CPTII,S$GLB,, | Performed by: INTERNAL MEDICINE

## 2019-11-19 PROCEDURE — 99999 PR PBB SHADOW E&M-EST. PATIENT-LVL IV: ICD-10-PCS | Mod: PBBFAC,,, | Performed by: INTERNAL MEDICINE

## 2019-11-19 PROCEDURE — 91200 LIVER ELASTOGRAPHY: CPT | Mod: S$GLB,,, | Performed by: INTERNAL MEDICINE

## 2019-11-19 PROCEDURE — 99999 PR PBB SHADOW E&M-EST. PATIENT-LVL IV: CPT | Mod: PBBFAC,,, | Performed by: INTERNAL MEDICINE

## 2019-11-19 PROCEDURE — 91200 PR LIVER ELASTOGRAPHY W/OUT IMAG W/INTERP & REPORT: ICD-10-PCS | Mod: S$GLB,,, | Performed by: INTERNAL MEDICINE

## 2019-11-19 PROCEDURE — 99203 PR OFFICE/OUTPT VISIT, NEW, LEVL III, 30-44 MIN: ICD-10-PCS | Mod: S$GLB,,, | Performed by: INTERNAL MEDICINE

## 2019-11-19 PROCEDURE — 99999 PR PBB SHADOW E&M-EST. PATIENT-LVL III: ICD-10-PCS | Mod: PBBFAC,,, | Performed by: INTERNAL MEDICINE

## 2019-11-19 PROCEDURE — 99213 PR OFFICE/OUTPT VISIT, EST, LEVL III, 20-29 MIN: ICD-10-PCS | Mod: S$GLB,,, | Performed by: INTERNAL MEDICINE

## 2019-11-19 PROCEDURE — 99203 OFFICE O/P NEW LOW 30 MIN: CPT | Mod: S$GLB,,, | Performed by: INTERNAL MEDICINE

## 2019-11-19 PROCEDURE — 99213 OFFICE O/P EST LOW 20 MIN: CPT | Mod: S$GLB,,, | Performed by: INTERNAL MEDICINE

## 2019-11-19 RX ORDER — AMOXICILLIN 875 MG/1
875 TABLET, FILM COATED ORAL EVERY 12 HOURS
Qty: 20 TABLET | Refills: 0 | Status: SHIPPED | OUTPATIENT
Start: 2019-11-19 | End: 2019-12-03 | Stop reason: SDUPTHER

## 2019-11-19 NOTE — PROCEDURES
Procedures   Fibroscan Procedure     Name: Isha Leonard  Date of Procedure : 2019   :: Anthony Forrester MD  Diagnosis: NAFLD    Probe: M    Fibroscan readin.6 KPa    Fibrosis:F 0-1     CAP readin dB/m    Steatosis: :S3

## 2019-11-19 NOTE — PROGRESS NOTES
HEPATOLOGY CONSULTATION    Referring Physician: Dr. COOKIE Duarte  Current Corresponding Physician: Dr. COOKIE Duarte    Reason for Consultation: Consultation for evaluation of Fatty Liver    History of Present Illness: Isha Leonard is a 64 y.o. femalewho presents for evaluation of   Chief Complaint   Patient presents with    Fatty Liver   Ms. Leonard was seen today for a fatty liver.  A recent ultrasound showed hepatic   steatosis and a gallstone, which from the patient's perspective is asymptomatic.    Her risk factors for nonalcoholic fatty liver disease include hyperlipidemia   and a body mass index of 27.5.  She does not drink alcohol.  Viral hepatitis   testing is negative.  She has no symptoms of hepatic decompensation.  A   FibroScan today showed F0 out of 4 fibrosis and S3 steatosis.      NG/HN  dd: 11/19/2019 14:41:06 (CST)  td: 11/20/2019 00:38:03 (CST)  Doc ID   #[id number]  Job ID #[id number]    CC:         Past Medical History:   Diagnosis Date    Anemia     AR (allergic rhinitis) 2/28/2013    Bilateral renal cysts: see CT scan May 2017 5/5/2017    Colon adenoma: 2011- repeat colonoscopy 12/16 7/19/2016    Colon polyp     Diverticulosis of large intestine without hemorrhage: see CT scan May 2017 5/5/2017    Fatty liver     GERD (gastroesophageal reflux disease)     Glucose intolerance (impaired glucose tolerance)     Hyperlipidemia     Hypertension     Mild vitamin D deficiency 3/19/2018    Pneumonia     Thyroid disease     Thyroid nodule: s/p R thyroidectomy; L side wnl 2014 2/17/2014    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 140 g 11    amoxicillin-clavulanate 875-125mg (AUGMENTIN) 875-125 mg per tablet Take 1 tablet by mouth 2 (two) times daily. 20 tablet 0    cholecalciferol, vitamin D3, (VITAMIN D3) 1,000 unit capsule Take 2 capsules  (2,000 Units total) by mouth once daily. 60 capsule 12    ketoconazole (NIZORAL) 2 % cream Apply topically once daily. 1 Tube 2    levothyroxine (SYNTHROID) 125 MCG tablet Take 1 tablet (125 mcg total) by mouth once daily. 90 tablet 3    sodium chloride (SALINE NASAL) 0.65 % nasal spray 1 spray by Nasal route as needed for Congestion. 50 mL 12     No facility-administered encounter medications on file as of 2019.      Review of patient's allergies indicates:  No Known Allergies  Family History   Problem Relation Age of Onset    Osteoporosis Mother     Diabetes Mother     Hypertension Mother     Heart disease Mother     Cancer Father         Bladder cancer    Diabetes Sister     No Known Problems Daughter     No Known Problems Son     No Known Problems Daughter     Cancer Brother     Breast cancer Neg Hx     Colon cancer Neg Hx     Eclampsia Neg Hx     Miscarriages / Stillbirths Neg Hx     Ovarian cancer Neg Hx      labor Neg Hx     Stroke Neg Hx     Cervical cancer Neg Hx     Endometrial cancer Neg Hx     Vaginal cancer Neg Hx        Social History     Socioeconomic History    Marital status:      Spouse name: Not on file    Number of children: 3    Years of education: Not on file    Highest education level: Not on file   Occupational History    Occupation: Teacher   Social Needs    Financial resource strain: Not on file    Food insecurity:     Worry: Not on file     Inability: Not on file    Transportation needs:     Medical: Not on file     Non-medical: Not on file   Tobacco Use    Smoking status: Never Smoker    Smokeless tobacco: Never Used   Substance and Sexual Activity    Alcohol use: No    Drug use: No    Sexual activity: Yes     Partners: Male     Birth control/protection: Post-menopausal   Lifestyle    Physical activity:     Days per week: Not on file     Minutes per session: Not on file    Stress: Not on file   Relationships    Social connections:      Talks on phone: Not on file     Gets together: Not on file     Attends Catholic service: Not on file     Active member of club or organization: Not on file     Attends meetings of clubs or organizations: Not on file     Relationship status: Not on file   Other Topics Concern    Are you pregnant or think you may be? No    Breast-feeding No   Social History Narrative    Not on file     Review of Systems   Constitutional: Negative for appetite change, fatigue and unexpected weight change.   HENT: Negative for ear pain, hearing loss, sore throat and trouble swallowing.    Eyes: Negative for visual disturbance.   Respiratory: Negative for cough and shortness of breath.    Cardiovascular: Negative for palpitations.   Gastrointestinal: Negative for abdominal pain, nausea and vomiting.   Genitourinary: Negative for difficulty urinating and dysuria.   Musculoskeletal: Negative for arthralgias and back pain.   Skin: Negative for rash.   Neurological: Negative for tremors, seizures and headaches.   Psychiatric/Behavioral: Negative for agitation and decreased concentration.     Vitals:    11/19/19 1417   BP: 133/80   Pulse: 84   Resp: 18       Physical Exam   Constitutional: She is oriented to person, place, and time. She appears well-developed and well-nourished.   HENT:   Right Ear: External ear normal.   Left Ear: External ear normal.   Mouth/Throat: Oropharynx is clear and moist.   Eyes: No scleral icterus.   Cardiovascular: Normal rate, regular rhythm and normal heart sounds. Exam reveals no gallop and no friction rub.   No murmur heard.  Pulmonary/Chest: Effort normal and breath sounds normal. No respiratory distress. She has no wheezes.   Abdominal: Soft. Bowel sounds are normal. She exhibits no distension and no mass. There is no tenderness.   Musculoskeletal: Normal range of motion. She exhibits no edema.   Lymphadenopathy:     She has no cervical adenopathy.   Neurological: She is alert and oriented to person,  place, and time. She has normal strength.   Skin: Skin is warm, dry and intact. She is not diaphoretic. No pallor.       Computed MELD-Na score unavailable. Necessary lab results were not found in the last year.  Computed MELD score unavailable. Necessary lab results were not found in the last year.    Lab Results   Component Value Date    GLU 97 04/16/2019    BUN 21 04/16/2019    CREATININE 0.8 04/16/2019    CALCIUM 9.7 04/16/2019     04/16/2019    K 3.9 04/16/2019     04/16/2019    PROT 7.6 04/16/2019    CO2 27 04/16/2019    ANIONGAP 10 04/16/2019    WBC 5.83 04/16/2019    RBC 5.06 04/16/2019    HGB 14.6 04/16/2019    HCT 45.3 04/16/2019    MCV 90 04/16/2019    MCH 28.9 04/16/2019    MCHC 32.2 04/16/2019     Lab Results   Component Value Date    RDW 12.8 04/16/2019     04/16/2019    MPV 9.6 04/16/2019    GRAN 2.7 04/16/2019    GRAN 46.4 04/16/2019    LYMPH 2.6 04/16/2019    LYMPH 45.3 04/16/2019    MONO 0.3 04/16/2019    MONO 5.5 04/16/2019    EOSINOPHIL 1.9 04/16/2019    BASOPHIL 0.7 04/16/2019    EOS 0.1 04/16/2019    BASO 0.04 04/16/2019    CHOL 218 (H) 04/16/2019    TRIG 160 (H) 04/16/2019    HDL 56 04/16/2019    CHOLHDL 25.7 04/16/2019    TOTALCHOLEST 3.9 04/16/2019    ALBUMIN 4.0 04/16/2019    BILIDIR 0.2 08/30/2010    AST 21 04/16/2019    ALT 23 04/16/2019    ALKPHOS 67 04/16/2019    MG 2.3 04/17/2017       Assessment and Plan:  Patient Active Problem List   Diagnosis    Other hyperlipidemia    Acquired hypothyroidism    AR (allergic rhinitis)    Glucose intolerance (impaired glucose tolerance)    Intramural leiomyoma of uterus    Gallstones: see ultrasound 2014; CT 2017    Colon adenoma: 2011- also 3/17: 5 year follow up recommended    Fatty liver: see u/s 2014; stable CT 2017    Midline cystocele    Rectus diastasis    Urinary, incontinence, stress female    Diverticulosis of large intestine without hemorrhage: see CT scan May 2017    Bilateral renal cysts: see CT scan May  2017    Osteopenia of multiple sites: see DEXA 2/18    Mild vitamin D deficiency     Isha Leonard is a 64 y.o. female withFatty Liver    Fibroscan today suggests F0/4 fibrosis and S3 (>2/3 steatosis).  I explained to the patient that the only treatment we have for fatty liver disease is weight loss and I recommend diet modification and exercise.  RTC in 1 year.

## 2019-11-19 NOTE — PROGRESS NOTES
Subjective:       Patient ID: Isha Leonard is a 64 y.o. female.    Chief Complaint: Sore Throat and Cough    64 year old lady complains of cough and sore throat for a week.  Thinks it is getting worse.  Also she has developed red areas on both sides of her tongue that are very tender    Review of Systems   Constitutional: Negative for activity change, chills, fatigue and fever.   HENT: Negative for congestion, ear pain, nosebleeds, postnasal drip, sinus pressure and sore throat.    Eyes: Negative.  Negative for visual disturbance.   Respiratory: Negative for cough, chest tightness, shortness of breath and wheezing.    Cardiovascular: Negative for chest pain.   Gastrointestinal: Negative for abdominal pain, diarrhea, nausea and vomiting.   Genitourinary: Negative for difficulty urinating, dysuria, frequency and urgency.   Musculoskeletal: Negative for arthralgias and neck stiffness.   Skin: Negative for rash.   Neurological: Negative for dizziness, weakness and headaches.   Psychiatric/Behavioral: Negative for sleep disturbance. The patient is not nervous/anxious.        Objective:      Physical Exam   Constitutional: She is oriented to person, place, and time. She appears well-developed and well-nourished.  Non-toxic appearance. No distress.   HENT:   Head: Normocephalic and atraumatic.   Right Ear: Tympanic membrane, external ear and ear canal normal.   Left Ear: Tympanic membrane, external ear and ear canal normal.   Mouth/Throat: Oropharyngeal exudate and posterior oropharyngeal erythema present.       Eyes: Pupils are equal, round, and reactive to light. EOM are normal. No scleral icterus.   Neck: Normal range of motion. Neck supple. No thyromegaly present.   Cardiovascular: Normal rate, regular rhythm and normal heart sounds.   Pulmonary/Chest: Effort normal and breath sounds normal.   Abdominal: Soft. Bowel sounds are normal. She exhibits no mass. There is no tenderness. There is no rebound.    Musculoskeletal: Normal range of motion.   Lymphadenopathy:     She has cervical adenopathy.        Right cervical: Superficial cervical adenopathy present.        Left cervical: Superficial cervical adenopathy present.   Neurological: She is alert and oriented to person, place, and time. She has normal reflexes. She displays normal reflexes. No cranial nerve deficit. She exhibits normal muscle tone. Coordination normal.   Skin: Skin is warm and dry.   Psychiatric: She has a normal mood and affect. Her behavior is normal.       Assessment:       1. Pharyngitis, unspecified etiology    2. Leukoplakia, tongue        Plan:   Isha was seen today for sore throat and cough.    Diagnoses and all orders for this visit:    Pharyngitis, unspecified etiology    Leukoplakia, tongue  -     Ambulatory consult to ENT    Other orders  -     amoxicillin (AMOXIL) 875 MG tablet; Take 1 tablet (875 mg total) by mouth every 12 (twelve) hours.

## 2019-11-19 NOTE — LETTER
November 19, 2019      Gwen Duarte MD  [address]  [place] [address]           [facility] - Hepatology  [address]  [place] [address]  Phone: [phone]  Fax: [phone]          Patient: Isha Leonard   MR Number: [id number]   YOB: 1955   Date of Visit: 11/19/2019       Dear Dr. Gwen Duarte:    Thank you for referring Isha Leonard to me for evaluation. Attached you will find relevant portions of my assessment and plan of care.    If you have questions, please do not hesitate to call me. I look forward to following Isha Leonard along with you.    Sincerely,    Anthony Forrester MD    Enclosure  CC:  No Recipients    If you would like to receive this communication electronically, please contact [email or web address] or [phone] to request more information on [facility] access.    For providers and/or their staff who would like to refer a patient to [facility], please contact us through our one-stop-shop provider referral line, [facility], at [phone].    If you feel you have received this communication in error or would no longer like to receive these types of communications, please e-mail [email or web address]

## 2019-11-26 ENCOUNTER — OFFICE VISIT (OUTPATIENT)
Dept: OTOLARYNGOLOGY | Facility: CLINIC | Age: 64
End: 2019-11-26
Payer: COMMERCIAL

## 2019-11-26 VITALS
WEIGHT: 154.75 LBS | DIASTOLIC BLOOD PRESSURE: 79 MMHG | HEART RATE: 94 BPM | BODY MASS INDEX: 27.42 KG/M2 | SYSTOLIC BLOOD PRESSURE: 121 MMHG

## 2019-11-26 DIAGNOSIS — J06.9 UPPER RESPIRATORY TRACT INFECTION, UNSPECIFIED TYPE: Primary | ICD-10-CM

## 2019-11-26 PROCEDURE — 99243 OFF/OP CNSLTJ NEW/EST LOW 30: CPT | Mod: S$GLB,,, | Performed by: OTOLARYNGOLOGY

## 2019-11-26 PROCEDURE — 99999 PR PBB SHADOW E&M-EST. PATIENT-LVL III: CPT | Mod: PBBFAC,,, | Performed by: OTOLARYNGOLOGY

## 2019-11-26 PROCEDURE — 99999 PR PBB SHADOW E&M-EST. PATIENT-LVL III: ICD-10-PCS | Mod: PBBFAC,,, | Performed by: OTOLARYNGOLOGY

## 2019-11-26 PROCEDURE — 99243 PR OFFICE CONSULTATION,LEVEL III: ICD-10-PCS | Mod: S$GLB,,, | Performed by: OTOLARYNGOLOGY

## 2019-11-26 NOTE — PROGRESS NOTES
Head and Neck Surgery Consult    Seen in consultation from Dr. Tang    HPI: Isha Leonard is a 64 y.o. female presenting with URI symptoms for 3 days. She was seen and evaluated by her PCP who prescribed Augmentin which has been helping. Initially she was complaining of tongue pain and swelling as well as PND, all of which have improved with treatment. Denies fevers/chills/night sweats. She is a nonsmoker.     Past Medical History:   Diagnosis Date    Anemia     AR (allergic rhinitis) 2/28/2013    Bilateral renal cysts: see CT scan May 2017 5/5/2017    Colon adenoma: 2011- repeat colonoscopy 12/16 7/19/2016    Colon polyp     Diverticulosis of large intestine without hemorrhage: see CT scan May 2017 5/5/2017    Fatty liver     GERD (gastroesophageal reflux disease)     Glucose intolerance (impaired glucose tolerance)     Hyperlipidemia     Hypertension     Mild vitamin D deficiency 3/19/2018    Pneumonia     Thyroid disease     Thyroid nodule: s/p R thyroidectomy; L side wnl 2014 2/17/2014    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       Past Surgical History:   Procedure Laterality Date    BREAST BIOPSY  2008    Left, benign    COLONOSCOPY N/A 3/3/2017    Procedure: COLONOSCOPY;  Surgeon: Mauri Richardson MD;  Location: [address]);  Service: Endoscopy;  Laterality: N/A;  pt req Dr Richardson    COLONOSCOPY W/ POLYPECTOMY      THYROIDECTOMY, PARTIAL      secondary to thyroid nodule         Current Outpatient Medications:     ammonium lactate 12 % Crea, Apply twice daily to affected parts both feet as needed., Disp: 140 g, Rfl: 11    amoxicillin (AMOXIL) 875 MG tablet, Take 1 tablet (875 mg total) by mouth every 12 (twelve) hours., Disp: 20 tablet, Rfl: 0    amoxicillin-clavulanate 875-125mg (AUGMENTIN) 875-125 mg per tablet, Take 1 tablet by mouth 2 (two) times daily., Disp: 20 tablet, Rfl: 0    cholecalciferol, vitamin D3, (VITAMIN D3) 1,000 unit capsule, Take 2 capsules (2,000 Units total) by mouth  once daily., Disp: 60 capsule, Rfl: 12    ketoconazole (NIZORAL) 2 % cream, Apply topically once daily., Disp: 1 Tube, Rfl: 2    levothyroxine (SYNTHROID) 125 MCG tablet, Take 1 tablet (125 mcg total) by mouth once daily., Disp: 90 tablet, Rfl: 3    sodium chloride (SALINE NASAL) 0.65 % nasal spray, 1 spray by Nasal route as needed for Congestion., Disp: 50 mL, Rfl: 12    Review of patient's allergies indicates:  No Known Allergies    Family History   Problem Relation Age of Onset    Osteoporosis Mother     Diabetes Mother     Hypertension Mother     Heart disease Mother     Cancer Father         Bladder cancer    Diabetes Sister     No Known Problems Daughter     No Known Problems Son     No Known Problems Daughter     Cancer Brother     Breast cancer Neg Hx     Colon cancer Neg Hx     Eclampsia Neg Hx     Miscarriages / Stillbirths Neg Hx     Ovarian cancer Neg Hx      labor Neg Hx     Stroke Neg Hx     Cervical cancer Neg Hx     Endometrial cancer Neg Hx     Vaginal cancer Neg Hx        Social History     Socioeconomic History    Marital status:      Spouse name: Not on file    Number of children: 3    Years of education: Not on file    Highest education level: Not on file   Occupational History    Occupation: Teacher   Social Needs    Financial resource strain: Not on file    Food insecurity:     Worry: Not on file     Inability: Not on file    Transportation needs:     Medical: Not on file     Non-medical: Not on file   Tobacco Use    Smoking status: Never Smoker    Smokeless tobacco: Never Used   Substance and Sexual Activity    Alcohol use: No    Drug use: No    Sexual activity: Yes     Partners: Male     Birth control/protection: Post-menopausal   Lifestyle    Physical activity:     Days per week: Not on file     Minutes per session: Not on file    Stress: Not on file   Relationships    Social connections:     Talks on phone: Not on file     Gets together:  Not on file     Attends Spiritism service: Not on file     Active member of club or organization: Not on file     Attends meetings of clubs or organizations: Not on file     Relationship status: Not on file   Other Topics Concern    Are you pregnant or think you may be? No    Breast-feeding No   Social History Narrative    Not on file       Review of Systems -  Constitutional: Denies having night sweats, constant fatigue, loss of appetite or recent substantial weight loss.  Eyes: Denies blurred vision or double vision.  Respiratory: Denies symptoms of shortness of breath, noisy breathing, hoarseness or chronic cough.  GI: Denies symptoms of heartburn, acid regurgitation, or the known presence of a hiatal hernia.  The remainder of a 10-point review of systems is negative    REVIEW OF RADIOLOGICAL FILMS AND RECORDS (PERSONALLY REVIEWED):  Noncontributory    REVIEW OF LABS (PERSONALLY REVIEWED)  Noncontributory    PHYSICAL EXAM:  Vitals - There were no vitals taken for this visit.  Constitutional -      General Appearance: well developed, well nourished, without obvious deformities     Communication: speaks with a normal voice without hoarseness  Head & Face -     Overall: no obvious scars, lesions or masses     Parotid and submandibular glands: no masses or tenderness     Facial strength: normal and equal bilaterally  Eyes -      EOM intact  Ear, Nose, Mouth & Throat -     Ears: both left and right external auditory canals and TM's are normal, no external deformities     Nasal exam: mucosa is pink, septum is midline, visible turbinates are normal on anterior rhinoscopy     Mastication: teeth appear in good repair     Oral Cavity and oropharynx: mucosa, hard and soft palates, tongue, posterior pharyngeal wall, lips and gums are without lesions. Tonsils appear minimal  Respiratory:     Breathing unlabored, no stridor  Cardiovascular:     No JVD, capillary refill normal  Larynx: using the mirror for indirect  laryngoscopy, the epiglottic, false cords, true cords, and pyriform sinuses are without lesions and the true vocal cords move normally  Neck: appears symmetric, and on palpation is without masses   Endocrine:     Thyroid: no asymmetry, thyromegaly, or thyroid nodules on palpation  Lymphatic:     No cervical lymphadenopathy  Cranial Nerves:      II: Pupillary reflexes normal     III, IV, VI: EOM normal     V: 1,2,3: normal sensation     VII: Normal strength in all divisions     IX, X: Normal voice, palatal elevation and sensation     XI: Shoulder strength normal       XII: Tongue mobility normal  Psychiatric:     Appropriate affect    ASSESSMENT: URI    PLAN: Continue antibiotics as prescribed. Reassurance provided.       Josafat Johnson

## 2019-11-26 NOTE — LETTER
November 26, 2019      Kavitha Tang MD  [address]  [facility] [address]           [facility]  [address]  [facility] [address]  Phone: [phone]  Fax: [phone]          Patient: Isha Leonard   MR Number: [id number]   YOB: 1955   Date of Visit: 11/26/2019       Dear Dr. Kavitha Tang:    Thank you for referring Isha Leonard to me for evaluation. Attached you will find relevant portions of my assessment and plan of care.    If you have questions, please do not hesitate to call me. I look forward to following Isha Leonard along with you.    Sincerely,    Josafat Johnson MD    Enclosure  CC:  No Recipients    If you would like to receive this communication electronically, please contact [email or web address] or [phone] to request more information on [employer] Link access.    For providers and/or their staff who would like to refer a patient to [facility], please contact us through our one-stop-shop provider referral line, [facility], at [phone].    If you feel you have received this communication in error or would no longer like to receive these types of communications, please e-mail [email or web address]

## 2019-12-03 NOTE — TELEPHONE ENCOUNTER
----- Message from Deven Tiwari sent at 12/3/2019 11:33 AM CST -----  Contact: [phone]  Type: Rx    Name of medication(s): amoxicillin (AMOXIL) 875 MG tablet    Is this a refill? New rx? Refill      Who prescribed medication?     Pharmacy Name, Phone, & Location:  [facility] [phone]    Comments: please call and advise, Thanks

## 2019-12-03 NOTE — TELEPHONE ENCOUNTER
Pt stated that she is still having a Sore Throat and Cough she saw Dr. Tang on 11/2019 but is not better and wants a refill on her Antibiotic   Please advise

## 2019-12-04 RX ORDER — AMOXICILLIN 875 MG/1
875 TABLET, FILM COATED ORAL EVERY 12 HOURS
Qty: 20 TABLET | Refills: 0 | Status: SHIPPED | OUTPATIENT
Start: 2019-12-04 | End: 2020-01-15

## 2019-12-10 ENCOUNTER — CLINICAL SUPPORT (OUTPATIENT)
Dept: FAMILY MEDICINE | Facility: CLINIC | Age: 64
End: 2019-12-10
Payer: COMMERCIAL

## 2019-12-10 DIAGNOSIS — Z23 NEED FOR INFLUENZA VACCINATION: Primary | ICD-10-CM

## 2019-12-10 PROCEDURE — 90662 IIV NO PRSV INCREASED AG IM: CPT | Mod: S$GLB,,, | Performed by: FAMILY MEDICINE

## 2019-12-10 PROCEDURE — 90471 FLU VACCINE - HIGH DOSE (65+) PRESERVATIVE FREE IM: ICD-10-PCS | Mod: S$GLB,,, | Performed by: FAMILY MEDICINE

## 2019-12-10 PROCEDURE — 90471 IMMUNIZATION ADMIN: CPT | Mod: S$GLB,,, | Performed by: FAMILY MEDICINE

## 2019-12-10 PROCEDURE — 90662 FLU VACCINE - HIGH DOSE (65+) PRESERVATIVE FREE IM: ICD-10-PCS | Mod: S$GLB,,, | Performed by: FAMILY MEDICINE

## 2020-01-10 ENCOUNTER — OFFICE VISIT (OUTPATIENT)
Dept: INTERNAL MEDICINE | Facility: CLINIC | Age: 65
End: 2020-01-10
Payer: COMMERCIAL

## 2020-01-10 ENCOUNTER — HOSPITAL ENCOUNTER (OUTPATIENT)
Dept: RADIOLOGY | Facility: HOSPITAL | Age: 65
Discharge: HOME | End: 2020-01-10
Attending: INTERNAL MEDICINE
Payer: COMMERCIAL

## 2020-01-10 VITALS
HEART RATE: 87 BPM | DIASTOLIC BLOOD PRESSURE: 85 MMHG | BODY MASS INDEX: 29.3 KG/M2 | WEIGHT: 159.19 LBS | OXYGEN SATURATION: 97 % | HEIGHT: 62 IN | SYSTOLIC BLOOD PRESSURE: 145 MMHG

## 2020-01-10 DIAGNOSIS — Z20.1 EXPOSURE TO TB: ICD-10-CM

## 2020-01-10 DIAGNOSIS — R09.81 CHRONIC NASAL CONGESTION: Primary | ICD-10-CM

## 2020-01-10 PROCEDURE — 71046 X-RAY EXAM CHEST 2 VIEWS: CPT | Mod: 26,,, | Performed by: RADIOLOGY

## 2020-01-10 PROCEDURE — 3008F BODY MASS INDEX DOCD: CPT | Mod: CPTII,S$GLB,, | Performed by: INTERNAL MEDICINE

## 2020-01-10 PROCEDURE — 1101F PR PT FALLS ASSESS DOC 0-1 FALLS W/OUT INJ PAST YR: ICD-10-PCS | Mod: CPTII,S$GLB,, | Performed by: INTERNAL MEDICINE

## 2020-01-10 PROCEDURE — 3008F PR BODY MASS INDEX (BMI) DOCUMENTED: ICD-10-PCS | Mod: CPTII,S$GLB,, | Performed by: INTERNAL MEDICINE

## 2020-01-10 PROCEDURE — 99999 PR PBB SHADOW E&M-EST. PATIENT-LVL IV: CPT | Mod: PBBFAC,,, | Performed by: INTERNAL MEDICINE

## 2020-01-10 PROCEDURE — 99214 PR OFFICE/OUTPT VISIT, EST, LEVL IV, 30-39 MIN: ICD-10-PCS | Mod: S$GLB,,, | Performed by: INTERNAL MEDICINE

## 2020-01-10 PROCEDURE — 71046 X-RAY EXAM CHEST 2 VIEWS: CPT | Mod: TC

## 2020-01-10 PROCEDURE — 99214 OFFICE O/P EST MOD 30 MIN: CPT | Mod: S$GLB,,, | Performed by: INTERNAL MEDICINE

## 2020-01-10 PROCEDURE — 1101F PT FALLS ASSESS-DOCD LE1/YR: CPT | Mod: CPTII,S$GLB,, | Performed by: INTERNAL MEDICINE

## 2020-01-10 PROCEDURE — 99999 PR PBB SHADOW E&M-EST. PATIENT-LVL IV: ICD-10-PCS | Mod: PBBFAC,,, | Performed by: INTERNAL MEDICINE

## 2020-01-10 PROCEDURE — 71046 XR CHEST PA AND LATERAL: ICD-10-PCS | Mod: 26,,, | Performed by: RADIOLOGY

## 2020-01-10 RX ORDER — FLUTICASONE PROPIONATE 50 MCG
2 SPRAY, SUSPENSION (ML) NASAL DAILY
Qty: 16 G | Refills: 1 | Status: SHIPPED | OUTPATIENT
Start: 2020-01-10 | End: 2020-03-09

## 2020-01-10 NOTE — PROGRESS NOTES
Subjective:       Patient ID: Isha Leonard is a 64 y.o. female.    Chief Complaint: Cough    Here for urgent care --  [place] to [facility], she has a cold and coughing.  Still coughing.  In contact with TB patient in [place].    Took abx from [facility] doctor, has 10 days of Amox.    Further hx -- Decreased chronic sinus congestion, post nasal gtt, cough, some purulent nasal mucous. Sinus pain L>R.    Some of interview with husb on phone explaining and then with her limited english when he needed to get off phone.    Review of Systems   Constitutional: Negative for activity change, fatigue and fever.   HENT: Positive for congestion, sinus pressure, sinus pain and sore throat. Negative for trouble swallowing.    Respiratory: Positive for cough. Negative for shortness of breath and wheezing.    Cardiovascular: Negative for chest pain, palpitations and leg swelling.       Objective:      Physical Exam   Constitutional: She appears well-developed and well-nourished. No distress.   HENT:   Head: Normocephalic and atraumatic.   Mouth/Throat: No oropharyngeal exudate.   L TM with bubbles/congestion, no purulence seen,    Sinuses mildly tender on L, nasal mucosa w/o purulence.    R TM is clear   Eyes: Pupils are equal, round, and reactive to light. EOM are normal. No scleral icterus.   Neck: Normal range of motion. Neck supple. No thyromegaly present.   Cardiovascular: Normal rate and regular rhythm.   Pulmonary/Chest: Effort normal and breath sounds normal. No respiratory distress. She has no wheezes. She has no rales.   Lymphadenopathy:     She has no cervical adenopathy.   Skin: She is not diaphoretic.   Psychiatric: She has a normal mood and affect. Her behavior is normal.       Assessment:       1. Chronic nasal congestion    2. Exposure to TB        Plan:       Isha was seen today for cough.    Diagnoses and all orders for this visit:    Chronic nasal congestion  -     fluticasone propionate (FLONASE) 50  mcg/actuation nasal spray; 2 sprays (100 mcg total) by Each Nostril route once daily.  -     Ambulatory Referral to ENT  She may have allergies vs chronic sinuitis, 1 month of flonase, finish Amoxicillin she has.  Continue nettipot    Exposure to TB  -     QUANTIFERON GOLD TB; Future  -     X-Ray Chest PA And Lateral; Future  I explained that her symptoms are not from TB, but will check given exposure.    brenda pressure -- keep appt back with Dr. Gwen Duarte MD      Future Appointments   Date Time Provider Department Center   1/15/2020  9:00 AM Gwen Duarte MD [facility]   2/3/2020  9:45 AM Fitz Ortiz III, MD [facility]     cc Gwen Duarte MD

## 2020-01-10 NOTE — PATIENT INSTRUCTIONS
Try netipot salt water nasal rinses with bottled water    [email or web address]://www.webmd.com/allergies/video/truth-about-[email or web address]-pots

## 2020-01-15 ENCOUNTER — TELEPHONE (OUTPATIENT)
Dept: INTERNAL MEDICINE | Facility: CLINIC | Age: 65
End: 2020-01-15

## 2020-01-15 ENCOUNTER — OFFICE VISIT (OUTPATIENT)
Dept: INTERNAL MEDICINE | Facility: CLINIC | Age: 65
End: 2020-01-15
Payer: COMMERCIAL

## 2020-01-15 VITALS
DIASTOLIC BLOOD PRESSURE: 80 MMHG | SYSTOLIC BLOOD PRESSURE: 125 MMHG | BODY MASS INDEX: 28.13 KG/M2 | HEIGHT: 63 IN | WEIGHT: 158.75 LBS

## 2020-01-15 DIAGNOSIS — Z00.00 ANNUAL PHYSICAL EXAM: Primary | ICD-10-CM

## 2020-01-15 DIAGNOSIS — E03.9 ACQUIRED HYPOTHYROIDISM: ICD-10-CM

## 2020-01-15 DIAGNOSIS — Z12.31 SCREENING MAMMOGRAM, ENCOUNTER FOR: ICD-10-CM

## 2020-01-15 DIAGNOSIS — K57.30 DIVERTICULOSIS OF LARGE INTESTINE WITHOUT HEMORRHAGE: ICD-10-CM

## 2020-01-15 DIAGNOSIS — K76.0 FATTY LIVER: ICD-10-CM

## 2020-01-15 DIAGNOSIS — R76.8 HELICOBACTER PYLORI AB+: Primary | ICD-10-CM

## 2020-01-15 DIAGNOSIS — K21.9 GASTROESOPHAGEAL REFLUX DISEASE, ESOPHAGITIS PRESENCE NOT SPECIFIED: ICD-10-CM

## 2020-01-15 DIAGNOSIS — J32.9 RECURRENT SINUSITIS: ICD-10-CM

## 2020-01-15 DIAGNOSIS — K80.20 GALLSTONES: ICD-10-CM

## 2020-01-15 PROCEDURE — 3008F PR BODY MASS INDEX (BMI) DOCUMENTED: ICD-10-PCS | Mod: CPTII,S$GLB,, | Performed by: INTERNAL MEDICINE

## 2020-01-15 PROCEDURE — 99214 PR OFFICE/OUTPT VISIT, EST, LEVL IV, 30-39 MIN: ICD-10-PCS | Mod: S$GLB,,, | Performed by: INTERNAL MEDICINE

## 2020-01-15 PROCEDURE — 99999 PR PBB SHADOW E&M-EST. PATIENT-LVL IV: ICD-10-PCS | Mod: PBBFAC,,, | Performed by: INTERNAL MEDICINE

## 2020-01-15 PROCEDURE — 3008F BODY MASS INDEX DOCD: CPT | Mod: CPTII,S$GLB,, | Performed by: INTERNAL MEDICINE

## 2020-01-15 PROCEDURE — 1101F PT FALLS ASSESS-DOCD LE1/YR: CPT | Mod: CPTII,S$GLB,, | Performed by: INTERNAL MEDICINE

## 2020-01-15 PROCEDURE — 99214 OFFICE O/P EST MOD 30 MIN: CPT | Mod: S$GLB,,, | Performed by: INTERNAL MEDICINE

## 2020-01-15 PROCEDURE — 99999 PR PBB SHADOW E&M-EST. PATIENT-LVL IV: CPT | Mod: PBBFAC,,, | Performed by: INTERNAL MEDICINE

## 2020-01-15 PROCEDURE — 1101F PR PT FALLS ASSESS DOC 0-1 FALLS W/OUT INJ PAST YR: ICD-10-PCS | Mod: CPTII,S$GLB,, | Performed by: INTERNAL MEDICINE

## 2020-01-15 NOTE — PROGRESS NOTES
Subjective:       Patient ID: Isha Leonard is a 64 y.o. female.    Chief Complaint: Gastroesophageal Reflux    Multiple issues    GERD and URI    URI sx, given antibiotics at another visit.  Amoxicillin 11/19 in .    [place] to [facility], she had a cold and coughing.  In contact with TB patient in [facility].  CXR and Quantiferon negative.  Used Netti pot and sx better     Took antibiotics from [facility] doctor, had 10 days of Amoxicillin.     Further hx -- Decreased chronic sinus congestion, post nasal gtt, cough, some purulent nasal mucous. Sinus pain L>R.    H pylori diagnosed way back in 2010 and was treated.  However, nose stool analysis was ever done.  She has minimal GERD but not really any pyloric pain. No weight loss or blood in her stools.    Patient Active Problem List:     Other hyperlipidemia     Acquired hypothyroidism     AR (allergic rhinitis)     Glucose intolerance (impaired glucose tolerance)     Intramural leiomyoma of uterus     Gallstones: see ultrasound 2014; CT 2017     Colon adenoma: 2011- also 3/17: 5 year follow up recommended     Fatty liver: see u/s 2014; stable CT 2017     Midline cystocele     Rectus diastasis     Urinary, incontinence, stress female     Diverticulosis of large intestine without hemorrhage: see CT scan May 2017     Bilateral renal cysts: see CT scan May 2017     Osteopenia of multiple sites: see DEXA 2/18     Mild vitamin D deficiency     Helicobacter pylori ab+ in 2010, treated      Review of Systems   Constitutional: Positive for fatigue. Negative for activity change, appetite change, chills and fever.   HENT: Positive for sinus pressure and sinus pain. Negative for congestion, hearing loss and sore throat.    Eyes: Negative for visual disturbance.   Respiratory: Negative for apnea, cough, shortness of breath and wheezing.    Cardiovascular: Negative for chest pain, palpitations and leg swelling.   Gastrointestinal: Negative for abdominal distention,  abdominal pain, constipation, diarrhea, nausea and vomiting.        GERD   Genitourinary: Negative for dysuria, frequency, hematuria and vaginal bleeding.   Musculoskeletal: Negative for gait problem, joint swelling and myalgias.   Skin: Negative for rash.   Neurological: Negative for dizziness, weakness, light-headedness and headaches.   Hematological: Negative for adenopathy. Does not bruise/bleed easily.   Psychiatric/Behavioral: Negative for confusion, hallucinations, sleep disturbance and suicidal ideas.       Objective:      Physical Exam   Constitutional: She is oriented to person, place, and time. She appears well-developed and well-nourished.   HENT:   Head: Normocephalic and atraumatic.   Right Ear: External ear normal.   Left Ear: External ear normal.   Mouth/Throat: Oropharynx is clear and moist.   Eyes: Conjunctivae are normal.   Neck: Normal range of motion. Neck supple. Thyromegaly present.   Well healed thyroid scar   Cardiovascular: Normal rate, regular rhythm and normal heart sounds.   Pulmonary/Chest: No respiratory distress. She has no wheezes. She has no rales.   Abdominal: Soft. Bowel sounds are normal. She exhibits no distension and no mass. There is no tenderness. There is no guarding.   Musculoskeletal: She exhibits no edema.   Lymphadenopathy:     She has no cervical adenopathy.   Neurological: She is alert and oriented to person, place, and time.   Skin: Skin is warm and dry. No rash noted. No erythema.       Assessment:       1. Helicobacter pylori ab+ in 2010, treated    2. Recurrent sinusitis    3. Screening mammogram, encounter for    4. Acquired hypothyroidism    5. Diverticulosis of large intestine without hemorrhage: see CT scan May 2017    6. Gallstones: see ultrasound 2014; CT 2017; also 2019    7. Gastroesophageal reflux disease, esophagitis presence not specified    8. Fatty liver: see u/s 2014; stable CT 2017: fibroscan 2019 F0/4 fibrosis and S3 (>2/3 steatosis).        Plan:          Isha was seen today for gastroesophageal reflux.    Diagnoses and all orders for this visit:    Helicobacter pylori ab+ in 2010, treated  -     H. pylori antigen, stool; Future    Recurrent sinusitis  -     Ambulatory referral/consult to Allergy  -     CT [employer] Sinuses without; Future.  Keep ENT appointment which is already scheduled    Screening mammogram, encounter for  -     Mammo Digital Screening Bilat w/ Herman; Standing    Acquired hypothyroidism; labs and last year stable.  Continue regimen    Diverticulosis of large intestine without hemorrhage: see CT scan May 2017; no alarm symptoms, will continue to monitor    Gallstones: see ultrasound 2014; CT 2017; also 2019; issues reviewed, diet issues discussed, may need to consider HIDA scan or further assessment, she currently declines    Gastroesophageal reflux disease, esophagitis presence not specified; see above.  Food diary.  Keep scrupulous track of symptoms    Fatty liver: see u/s 2014; stable CT 2017: fibroscan 2019 F0/4 fibrosis and S3 (>2/3 steatosis).  Keep hepatology follow-up.  Keep working on diet and exercise    May need to consider formal Gastro follow-up  Physical exam with mammogram and full labs in the next 3-4 months  I will review all studies and determine further tx depending on findings

## 2020-02-11 ENCOUNTER — LAB VISIT (OUTPATIENT)
Dept: LAB | Facility: HOSPITAL | Age: 65
End: 2020-02-11
Attending: INTERNAL MEDICINE
Payer: COMMERCIAL

## 2020-02-11 DIAGNOSIS — R76.8 HELICOBACTER PYLORI AB+: ICD-10-CM

## 2020-02-11 PROCEDURE — 87338 HPYLORI STOOL AG IA: CPT

## 2020-02-15 LAB — H PYLORI AG STL QL IA: NOT DETECTED

## 2020-02-17 ENCOUNTER — PATIENT MESSAGE (OUTPATIENT)
Dept: INTERNAL MEDICINE | Facility: CLINIC | Age: 65
End: 2020-02-17

## 2020-03-07 DIAGNOSIS — R09.81 CHRONIC NASAL CONGESTION: ICD-10-CM

## 2020-03-09 RX ORDER — FLUTICASONE PROPIONATE 50 MCG
SPRAY, SUSPENSION (ML) NASAL
Qty: 16 ML | Refills: 1 | Status: SHIPPED | OUTPATIENT
Start: 2020-03-09 | End: 2021-03-02

## 2020-04-24 ENCOUNTER — TELEPHONE (OUTPATIENT)
Dept: INTERNAL MEDICINE | Facility: CLINIC | Age: 65
End: 2020-04-24

## 2020-04-24 NOTE — TELEPHONE ENCOUNTER
She needs a physical exam with me in the next 3-4 months with labs prior, orders are in, please schedule.  She also it will be due for mammogram which she can do in June, orders are in for that as well    Please let me know when scheduled thanks

## 2020-06-10 ENCOUNTER — TELEPHONE (OUTPATIENT)
Dept: INTERNAL MEDICINE | Facility: CLINIC | Age: 65
End: 2020-06-10

## 2020-06-11 ENCOUNTER — TELEPHONE (OUTPATIENT)
Dept: INTERNAL MEDICINE | Facility: CLINIC | Age: 65
End: 2020-06-11

## 2020-06-11 NOTE — TELEPHONE ENCOUNTER
----- Message from Dinorah Campoverde sent at 6/11/2020  3:17 PM CDT -----  Contact: Patient [phone]  Patient has appt on 06/16/2020 and is requesting labs.    Please call and advise.    Thank You

## 2020-06-12 ENCOUNTER — LAB VISIT (OUTPATIENT)
Dept: LAB | Facility: HOSPITAL | Age: 65
End: 2020-06-12
Attending: INTERNAL MEDICINE
Payer: COMMERCIAL

## 2020-06-12 DIAGNOSIS — Z00.00 ANNUAL PHYSICAL EXAM: ICD-10-CM

## 2020-06-12 LAB
25(OH)D3+25(OH)D2 SERPL-MCNC: 28 NG/ML (ref 30–96)
ALBUMIN SERPL BCP-MCNC: 3.7 G/DL (ref 3.5–5.2)
ALP SERPL-CCNC: 175 U/L (ref 55–135)
ALT SERPL W/O P-5'-P-CCNC: 87 U/L (ref 10–44)
ANION GAP SERPL CALC-SCNC: 11 MMOL/L (ref 8–16)
AST SERPL-CCNC: 68 U/L (ref 10–40)
BASOPHILS # BLD AUTO: 0.04 K/UL (ref 0–0.2)
BASOPHILS NFR BLD: 0.3 % (ref 0–1.9)
BILIRUB SERPL-MCNC: 1 MG/DL (ref 0.1–1)
BUN SERPL-MCNC: 12 MG/DL (ref 8–23)
CALCIUM SERPL-MCNC: 9.5 MG/DL (ref 8.7–10.5)
CHLORIDE SERPL-SCNC: 105 MMOL/L (ref 95–110)
CHOLEST SERPL-MCNC: 205 MG/DL (ref 120–199)
CHOLEST/HDLC SERPL: 3.6 {RATIO} (ref 2–5)
CO2 SERPL-SCNC: 23 MMOL/L (ref 23–29)
CREAT SERPL-MCNC: 0.7 MG/DL (ref 0.5–1.4)
DIFFERENTIAL METHOD: ABNORMAL
EOSINOPHIL # BLD AUTO: 0.2 K/UL (ref 0–0.5)
EOSINOPHIL NFR BLD: 1.1 % (ref 0–8)
ERYTHROCYTE [DISTWIDTH] IN BLOOD BY AUTOMATED COUNT: 12.7 % (ref 11.5–14.5)
EST. GFR  (AFRICAN AMERICAN): >60 ML/MIN/1.73 M^2
EST. GFR  (NON AFRICAN AMERICAN): >60 ML/MIN/1.73 M^2
ESTIMATED AVG GLUCOSE: 126 MG/DL (ref 68–131)
GLUCOSE SERPL-MCNC: 115 MG/DL (ref 70–110)
HBA1C MFR BLD HPLC: 6 % (ref 4–5.6)
HCT VFR BLD AUTO: 44.2 % (ref 37–48.5)
HDLC SERPL-MCNC: 57 MG/DL (ref 40–75)
HDLC SERPL: 27.8 % (ref 20–50)
HGB BLD-MCNC: 14 G/DL (ref 12–16)
IMM GRANULOCYTES # BLD AUTO: 0.05 K/UL (ref 0–0.04)
IMM GRANULOCYTES NFR BLD AUTO: 0.4 % (ref 0–0.5)
LDLC SERPL CALC-MCNC: 123.8 MG/DL (ref 63–159)
LYMPHOCYTES # BLD AUTO: 3.1 K/UL (ref 1–4.8)
LYMPHOCYTES NFR BLD: 22.8 % (ref 18–48)
MCH RBC QN AUTO: 28.5 PG (ref 27–31)
MCHC RBC AUTO-ENTMCNC: 31.7 G/DL (ref 32–36)
MCV RBC AUTO: 90 FL (ref 82–98)
MONOCYTES # BLD AUTO: 0.9 K/UL (ref 0.3–1)
MONOCYTES NFR BLD: 7 % (ref 4–15)
NEUTROPHILS # BLD AUTO: 9.2 K/UL (ref 1.8–7.7)
NEUTROPHILS NFR BLD: 68.4 % (ref 38–73)
NONHDLC SERPL-MCNC: 148 MG/DL
NRBC BLD-RTO: 0 /100 WBC
PLATELET # BLD AUTO: 355 K/UL (ref 150–350)
PMV BLD AUTO: 10.3 FL (ref 9.2–12.9)
POTASSIUM SERPL-SCNC: 4.2 MMOL/L (ref 3.5–5.1)
PROT SERPL-MCNC: 7.8 G/DL (ref 6–8.4)
RBC # BLD AUTO: 4.91 M/UL (ref 4–5.4)
SODIUM SERPL-SCNC: 139 MMOL/L (ref 136–145)
T4 FREE SERPL-MCNC: 1.06 NG/DL (ref 0.71–1.51)
TRIGL SERPL-MCNC: 121 MG/DL (ref 30–150)
TSH SERPL DL<=0.005 MIU/L-ACNC: 4.93 UIU/ML (ref 0.4–4)
VIT B12 SERPL-MCNC: 417 PG/ML (ref 210–950)
WBC # BLD AUTO: 13.48 K/UL (ref 3.9–12.7)

## 2020-06-12 PROCEDURE — 82306 VITAMIN D 25 HYDROXY: CPT

## 2020-06-12 PROCEDURE — 80053 COMPREHEN METABOLIC PANEL: CPT

## 2020-06-12 PROCEDURE — 82607 VITAMIN B-12: CPT

## 2020-06-12 PROCEDURE — 36415 COLL VENOUS BLD VENIPUNCTURE: CPT | Mod: PO

## 2020-06-12 PROCEDURE — 84443 ASSAY THYROID STIM HORMONE: CPT

## 2020-06-12 PROCEDURE — 80061 LIPID PANEL: CPT

## 2020-06-12 PROCEDURE — 84439 ASSAY OF FREE THYROXINE: CPT

## 2020-06-12 PROCEDURE — 83036 HEMOGLOBIN GLYCOSYLATED A1C: CPT

## 2020-06-12 PROCEDURE — 85025 COMPLETE CBC W/AUTO DIFF WBC: CPT

## 2020-06-13 ENCOUNTER — OFFICE VISIT (OUTPATIENT)
Dept: INTERNAL MEDICINE | Facility: CLINIC | Age: 65
End: 2020-06-13
Payer: COMMERCIAL

## 2020-06-13 VITALS
HEART RATE: 84 BPM | BODY MASS INDEX: 27.55 KG/M2 | HEIGHT: 62 IN | DIASTOLIC BLOOD PRESSURE: 78 MMHG | SYSTOLIC BLOOD PRESSURE: 116 MMHG | OXYGEN SATURATION: 96 % | WEIGHT: 149.69 LBS

## 2020-06-13 DIAGNOSIS — R10.32 LEFT LOWER QUADRANT PAIN: ICD-10-CM

## 2020-06-13 LAB
BILIRUB UR QL STRIP: NEGATIVE
CLARITY UR REFRACT.AUTO: CLEAR
COLOR UR AUTO: YELLOW
GLUCOSE UR QL STRIP: NEGATIVE
HGB UR QL STRIP: NEGATIVE
KETONES UR QL STRIP: NEGATIVE
LEUKOCYTE ESTERASE UR QL STRIP: ABNORMAL
MICROSCOPIC COMMENT: NORMAL
NITRITE UR QL STRIP: NEGATIVE
PH UR STRIP: 6 [PH] (ref 5–8)
PROT UR QL STRIP: NEGATIVE
RBC #/AREA URNS AUTO: 2 /HPF (ref 0–4)
SP GR UR STRIP: 1.02 (ref 1–1.03)
SQUAMOUS #/AREA URNS AUTO: 2 /HPF
URN SPEC COLLECT METH UR: ABNORMAL
WBC #/AREA URNS AUTO: 1 /HPF (ref 0–5)

## 2020-06-13 PROCEDURE — 99214 OFFICE O/P EST MOD 30 MIN: CPT | Mod: S$GLB,,, | Performed by: INTERNAL MEDICINE

## 2020-06-13 PROCEDURE — 1159F PR MEDICATION LIST DOCUMENTED IN MEDICAL RECORD: ICD-10-PCS | Mod: S$GLB,,, | Performed by: INTERNAL MEDICINE

## 2020-06-13 PROCEDURE — 1125F AMNT PAIN NOTED PAIN PRSNT: CPT | Mod: S$GLB,,, | Performed by: INTERNAL MEDICINE

## 2020-06-13 PROCEDURE — 1159F MED LIST DOCD IN RCRD: CPT | Mod: S$GLB,,, | Performed by: INTERNAL MEDICINE

## 2020-06-13 PROCEDURE — 1101F PR PT FALLS ASSESS DOC 0-1 FALLS W/OUT INJ PAST YR: ICD-10-PCS | Mod: CPTII,S$GLB,, | Performed by: INTERNAL MEDICINE

## 2020-06-13 PROCEDURE — 99999 PR PBB SHADOW E&M-EST. PATIENT-LVL IV: CPT | Mod: PBBFAC,,, | Performed by: INTERNAL MEDICINE

## 2020-06-13 PROCEDURE — 87086 URINE CULTURE/COLONY COUNT: CPT

## 2020-06-13 PROCEDURE — 99999 PR PBB SHADOW E&M-EST. PATIENT-LVL IV: ICD-10-PCS | Mod: PBBFAC,,, | Performed by: INTERNAL MEDICINE

## 2020-06-13 PROCEDURE — 99214 PR OFFICE/OUTPT VISIT, EST, LEVL IV, 30-39 MIN: ICD-10-PCS | Mod: S$GLB,,, | Performed by: INTERNAL MEDICINE

## 2020-06-13 PROCEDURE — 1101F PT FALLS ASSESS-DOCD LE1/YR: CPT | Mod: CPTII,S$GLB,, | Performed by: INTERNAL MEDICINE

## 2020-06-13 PROCEDURE — 1125F PR PAIN SEVERITY QUANTIFIED, PAIN PRESENT: ICD-10-PCS | Mod: S$GLB,,, | Performed by: INTERNAL MEDICINE

## 2020-06-13 PROCEDURE — 87088 URINE BACTERIA CULTURE: CPT

## 2020-06-13 PROCEDURE — 81001 URINALYSIS AUTO W/SCOPE: CPT

## 2020-06-13 RX ORDER — AMOXICILLIN AND CLAVULANATE POTASSIUM 875; 125 MG/1; MG/1
1 TABLET, FILM COATED ORAL 2 TIMES DAILY
Qty: 14 TABLET | Refills: 0 | Status: SHIPPED | OUTPATIENT
Start: 2020-06-13 | End: 2020-06-20

## 2020-06-13 NOTE — PATIENT INSTRUCTIONS
Diverticulitis    Some people get pouches along the wall of the colon as they get older. The pouches, called diverticuli, usually cause no symptoms. If the pouches become blocked, you can get an infection. This infection is called diverticulitis. It causes pain in your lower abdomen and fever. If not treated, it can become a serious condition, causing an abscess to form inside the pouch. The abscess may block the intestinal tract even or rupture, spreading infection throughout the abdomen.  When treatment is started early, oral antibiotics alone may be enough to cure diverticulitis. This method is tried first. But, if you don't improve or if your condition gets worse while using oral antibiotics, you may need to be admitted to the hospital for IV antibiotics. Severe cases may require surgery.  Home care  The following guidelines will help you care for yourself at home:  · During the acute illness, rest and follow your healthcare provider's instructions about diet. Sometimes you will need to follow a clear liquid diet to rest your bowel. Once your symptoms are better, you may be told to follow a low-fiber diet for some time. Include foods like:  ¨ Flake cereal, mashed potatoes, pancakes, waffles, pasta, white bread, rice, applesauce, bananas, eggs, fish, poultry, tofu, and cooked soft vegetables  · Take antibiotics exactly as instructed. Don't miss any doses or stop taking the medication, even if you feel better.  · Monitor your temperature and tell your healthcare provider if you have rising temperatures.  Preventing future attacks  Once you have an episode of diverticulitis, you are at risk for having it again. After you have recovered from this episode, you may be able to lower your risk by eating a high-fiber diet (20 gm/day to 35 gm/day of fiber). This cleans out the colon pouches that already exist and may prevent new ones from forming. Foods high in fiber include fresh fruits and edible peelings, raw or  lightly cooked vegetables, whole grain cereals and breads, dried beans and peas, and bran.  Other steps that can help prevent future attacks include:  · Take your medicines, such as antibiotics, as your healthcare provider says.  · Drink 6 to 8 glasses of water every day, unless told otherwise.  · Use a heating pad or hot water bottle to help abdominal cramping or pain.  · Begin an exercise program. Ask your healthcare provider how to get started. You can benefit from simple activities such as walking or gardening.  · Treat diarrhea with a bland diet. Start with liquids only; then slowly add fiber over time.  · Watch for changes in your bowel movements (constipation to diarrhea). Avoid constipation by eating a high fiber diet and taking a stool softener if needed.  · Get plenty of rest and sleep.  Follow-up care  Follow up with your healthcare provider as advised or sooner if you are not getting better in the next 2 days.  When to seek medical advice  Call your healthcare provider right away if any of these occur:  · Fever of 100.4°F (38°C) or higher, or as directed by your healthcare provider  · Repeated vomiting or swelling of the abdomen  · Weakness, dizziness, light-headedness  · Pain in your abdomen that gets worse, severe, or spreads to your back  · Pain that moves to the right lower abdomen  · Rectal bleeding (stools that are red, black or maroon color)  · Unexpected vaginal bleeding  Date Last Reviewed: 9/1/2016  © 2327-8268 [email or web address]. [address] [place] [address], [place] [address]. All rights reserved. This information is not intended as a substitute for professional medical care. Always follow your healthcare professional's instructions.

## 2020-06-13 NOTE — PROGRESS NOTES
Subjective:       Patient ID: Isha Leonard is a 65 y.o. female.    Chief Complaint: Follow-up and Abdominal Pain (lower left side, pain rate 4, 1 week )    Here for urgent care --  Severe L sided abd pains. Pains fluctuate in level of severity. Current pain LLQ. Also urine is darker appearing since symptoms onset. Symptoms abt 3-4 days. Never had before. No f/c/ns. No n/v.    Review of Systems   Constitutional: Negative for activity change, fatigue, fever and unexpected weight change.   Gastrointestinal: Positive for abdominal pain. Negative for abdominal distention, constipation, nausea and vomiting.   Genitourinary: Negative for decreased urine volume, dysuria and hematuria.   Skin: Negative for rash.   Psychiatric/Behavioral: Negative for confusion.       Objective:      Physical Exam  Constitutional:       General: She is not in acute distress.     Appearance: Normal appearance. She is well-developed. She is not ill-appearing, toxic-appearing or diaphoretic.   HENT:      Head: Normocephalic and atraumatic.   Eyes:      General: No scleral icterus.     Pupils: Pupils are equal, round, and reactive to light.   Neck:      Musculoskeletal: Normal range of motion.      Thyroid: No thyromegaly.   Cardiovascular:      Rate and Rhythm: Normal rate and regular rhythm.      Heart sounds: Normal heart sounds. No murmur. No friction rub. No gallop.    Pulmonary:      Effort: Pulmonary effort is normal. No respiratory distress.      Breath sounds: Normal breath sounds. No wheezing or rales.   Abdominal:      General: Bowel sounds are normal. There is no distension.      Palpations: Abdomen is soft. There is no mass.      Tenderness: There is abdominal tenderness. There is no guarding.      Comments: Focal tenderness LLQ, mild rebound refers to LLQ as well.  No CVAT   Musculoskeletal: Normal range of motion.         General: No tenderness.   Lymphadenopathy:      Cervical: No cervical adenopathy.   Neurological:       General: No focal deficit present.      Mental Status: She is alert and oriented to person, place, and time.   Psychiatric:         Mood and Affect: Mood normal.         Speech: Speech normal.         Behavior: Behavior normal.         Assessment:       1. Left lower quadrant pain        Plan:       Isha was seen today for follow-up and abdominal pain.    Diagnoses and all orders for this visit:    Left lower quadrant pain  -     Urinalysis  -     Urine culture  -     CT Abdomen Pelvis With Contrast; Future  -     amoxicillin-clavulanate 875-125mg (AUGMENTIN) 875-125 mg per tablet; Take 1 tablet by mouth 2 (two) times daily. for 7 days  Presumed diverticulitis  Abx, clear liquids, to ED if symptoms worsen  Explained that if not better in 1-2 weeks, pt should rtc/call PCP      cc Gwen Duarte MD      Future Appointments   Date Time Provider Department Center   6/15/2020 10:00 AM [facility] CT1 LIMIT 400 LBS WB CT SCAN [facility]   6/16/2020 11:00 AM Gwen Duarte MD [facility] IM [facility] ERIC   6/19/2020  8:45 AM [facility] OIC-US1 MASTER [facility] ULTR [facility]   7/15/2020  9:00 AM Geneva Dickson MD [facility] [id number]   7/23/2020 10:00 AM Gwen Duarte MD [facility] IM [facility] HARRELL[facility]

## 2020-06-15 LAB — BACTERIA UR CULT: ABNORMAL

## 2020-06-16 ENCOUNTER — OFFICE VISIT (OUTPATIENT)
Dept: INTERNAL MEDICINE | Facility: CLINIC | Age: 65
End: 2020-06-16
Payer: COMMERCIAL

## 2020-06-16 VITALS
BODY MASS INDEX: 27.05 KG/M2 | DIASTOLIC BLOOD PRESSURE: 80 MMHG | HEIGHT: 62 IN | WEIGHT: 147 LBS | SYSTOLIC BLOOD PRESSURE: 125 MMHG

## 2020-06-16 DIAGNOSIS — E03.9 ACQUIRED HYPOTHYROIDISM: ICD-10-CM

## 2020-06-16 DIAGNOSIS — R74.01 ELEVATED TRANSAMINASE LEVEL: ICD-10-CM

## 2020-06-16 DIAGNOSIS — K57.92 DIVERTICULITIS: ICD-10-CM

## 2020-06-16 DIAGNOSIS — E78.49 OTHER HYPERLIPIDEMIA: ICD-10-CM

## 2020-06-16 DIAGNOSIS — D72.829 LEUKOCYTOSIS, UNSPECIFIED TYPE: ICD-10-CM

## 2020-06-16 DIAGNOSIS — Z12.31 SCREENING MAMMOGRAM, ENCOUNTER FOR: ICD-10-CM

## 2020-06-16 DIAGNOSIS — R73.02 GLUCOSE INTOLERANCE (IMPAIRED GLUCOSE TOLERANCE): ICD-10-CM

## 2020-06-16 DIAGNOSIS — R10.32 LEFT LOWER QUADRANT ABDOMINAL PAIN: Primary | ICD-10-CM

## 2020-06-16 DIAGNOSIS — E55.9 MILD VITAMIN D DEFICIENCY: ICD-10-CM

## 2020-06-16 PROCEDURE — 1101F PR PT FALLS ASSESS DOC 0-1 FALLS W/OUT INJ PAST YR: ICD-10-PCS | Mod: CPTII,S$GLB,, | Performed by: INTERNAL MEDICINE

## 2020-06-16 PROCEDURE — 1126F AMNT PAIN NOTED NONE PRSNT: CPT | Mod: S$GLB,,, | Performed by: INTERNAL MEDICINE

## 2020-06-16 PROCEDURE — 99999 PR PBB SHADOW E&M-EST. PATIENT-LVL III: CPT | Mod: PBBFAC,,, | Performed by: INTERNAL MEDICINE

## 2020-06-16 PROCEDURE — 1159F PR MEDICATION LIST DOCUMENTED IN MEDICAL RECORD: ICD-10-PCS | Mod: S$GLB,,, | Performed by: INTERNAL MEDICINE

## 2020-06-16 PROCEDURE — 1159F MED LIST DOCD IN RCRD: CPT | Mod: S$GLB,,, | Performed by: INTERNAL MEDICINE

## 2020-06-16 PROCEDURE — 1101F PT FALLS ASSESS-DOCD LE1/YR: CPT | Mod: CPTII,S$GLB,, | Performed by: INTERNAL MEDICINE

## 2020-06-16 PROCEDURE — 1126F PR PAIN SEVERITY QUANTIFIED, NO PAIN PRESENT: ICD-10-PCS | Mod: S$GLB,,, | Performed by: INTERNAL MEDICINE

## 2020-06-16 PROCEDURE — 99999 PR PBB SHADOW E&M-EST. PATIENT-LVL III: ICD-10-PCS | Mod: PBBFAC,,, | Performed by: INTERNAL MEDICINE

## 2020-06-16 PROCEDURE — 99214 OFFICE O/P EST MOD 30 MIN: CPT | Mod: S$GLB,,, | Performed by: INTERNAL MEDICINE

## 2020-06-16 PROCEDURE — 99214 PR OFFICE/OUTPT VISIT, EST, LEVL IV, 30-39 MIN: ICD-10-PCS | Mod: S$GLB,,, | Performed by: INTERNAL MEDICINE

## 2020-06-16 RX ORDER — VIT C/E/ZN/COPPR/LUTEIN/ZEAXAN 250MG-90MG
2000 CAPSULE ORAL DAILY
Qty: 60 CAPSULE | Refills: 12 | Status: SHIPPED | OUTPATIENT
Start: 2020-06-16 | End: 2021-03-02

## 2020-06-16 NOTE — PATIENT INSTRUCTIONS
Understanding Diverticulosis and Diverticulitis     Pouches or diverticula usually occur in the lower part of the colon called the sigmoid.     The colon (large intestine) is the last part of the digestive tract. It absorbs water from stool and changes it from a liquid to a solid. In certain cases, small pouches called diverticula can form in the colon wall. This condition is called diverticulosis. The pouches can become infected. If this happens, it becomes a more serious problem called diverticulitis. These problems can be painful. But they can be managed.  Managing your condition  Diet changes or medicines may be prescribed.   If you have diverticulosis  Recommendations include:  · Diet changes are often enough to control symptoms. The main changes are adding fiber (roughage) and drinking more water. Fiber absorbs water as it travels through your colon. This helps your stool stay soft and move smoothly. Water helps this process.  · If needed, you may be told to take over-the-counter stool softeners.  · To help relieve pain, antispasmodic medicines may be prescribed.  · Watch for changes in your bowel movements. Tell the healthcare provider if you notice any changes.  · Begin an exercise program. Ask your healthcare provider how to get started.  · Get plenty of rest and sleep.   If you have diverticulitis  Treatment depends on how bad your symptoms are.  · For mild symptoms. You may be put on a liquid diet for a short time. Antibiotics are usually prescribed. If these two steps relieve your symptoms, you may then be prescribed a high-fiber diet. If you still have symptoms, your healthcare provider will discuss more treatment choices with you.  · For severe symptoms. You may need to be admitted to the hospital. There, you can be given IV antibiotics and fluids. You will also be put on a low-fiber or liquid diet. Although not common, surgery is needed in some people with severe symptoms.  [place] to colon health      Diverticulitis occurs when the pouches become infected or inflamed.     Help keep your colon healthy with a diet that includes plenty of high-fiber fruits, vegetables, and whole grains. Drink plenty of liquids like water and juice. Maintain a healthy lifestyle including regular exercise, stress management, and adequate rest and sleep.   Date Last Reviewed: 7/1/2016 © 2000-2017 [employer]. [address] [place] [address]. All rights reserved. This information is not intended as a substitute for professional medical care. Always follow your healthcare professional's instructions.        Diverticulitis    Some people get pouches along the wall of the colon as they get older. The pouches, called diverticuli, usually cause no symptoms. If the pouches become blocked, you can get an infection. This infection is called diverticulitis. It causes pain in your lower abdomen and fever. If not treated, it can become a serious condition, causing an abscess to form inside the pouch. The abscess may block the intestinal tract even or rupture, spreading infection throughout the abdomen.  When treatment is started early, oral antibiotics alone may be enough to cure diverticulitis. This method is tried first. But, if you don't improve or if your condition gets worse while using oral antibiotics, you may need to be admitted to the hospital for IV antibiotics. Severe cases may require surgery.  Home care  The following guidelines will help you care for yourself at home:  · During the acute illness, rest and follow your healthcare provider's instructions about diet. Sometimes you will need to follow a clear liquid diet to rest your bowel. Once your symptoms are better, you may be told to follow a low-fiber diet for some time. Include foods like:  ¨ Flake cereal, mashed potatoes, pancakes, waffles, pasta, white bread, rice, applesauce, bananas, eggs, fish, poultry, tofu, and cooked soft vegetables  · Take  antibiotics exactly as instructed. Don't miss any doses or stop taking the medication, even if you feel better.  · Monitor your temperature and tell your healthcare provider if you have rising temperatures.  Preventing future attacks  Once you have an episode of diverticulitis, you are at risk for having it again. After you have recovered from this episode, you may be able to lower your risk by eating a high-fiber diet (20 gm/day to 35 gm/day of fiber). This cleans out the colon pouches that already exist and may prevent new ones from forming. Foods high in fiber include fresh fruits and edible peelings, raw or lightly cooked vegetables, whole grain cereals and breads, dried beans and peas, and bran.  Other steps that can help prevent future attacks include:  · Take your medicines, such as antibiotics, as your healthcare provider says.  · Drink 6 to 8 glasses of water every day, unless told otherwise.  · Use a heating pad or hot water bottle to help abdominal cramping or pain.  · Begin an exercise program. Ask your healthcare provider how to get started. You can benefit from simple activities such as walking or gardening.  · Treat diarrhea with a bland diet. Start with liquids only; then slowly add fiber over time.  · Watch for changes in your bowel movements (constipation to diarrhea). Avoid constipation by eating a high fiber diet and taking a stool softener if needed.  · Get plenty of rest and sleep.  Follow-up care  Follow up with your healthcare provider as advised or sooner if you are not getting better in the next 2 days.  When to seek medical advice  Call your healthcare provider right away if any of these occur:  · Fever of 100.4°F (38°C) or higher, or as directed by your healthcare provider  · Repeated vomiting or swelling of the abdomen  · Weakness, dizziness, light-headedness  · Pain in your abdomen that gets worse, severe, or spreads to your back  · Pain that moves to the right lower abdomen  · Rectal  bleeding (stools that are red, black or maroon color)  · Unexpected vaginal bleeding  Date Last Reviewed: 9/1/2016  © 8919-1050 [employer]. [address] [place] [address], [place] [address]. All rights reserved. This information is not intended as a substitute for professional medical care. Always follow your healthcare professional's instructions.        Discharge Instructions for Diverticulitis  You have been diagnosed with diverticulitis. This is a condition in which small pouches form in your colon (large intestine) and become inflamed or infected. Follow the guidelines below for home care.  As you recover  Tips for recovery include:  · Eat a low-fiber diet. Your healthcare provider may advise a liquid diet. This gives your bowel a chance to rest so that it can recover.  · Foods to include: flake cereal, mashed potatoes, pancakes, waffles, pasta, white bread, rice, applesauce, bananas, eggs, fish, poultry, tofu, and well-cooked vegetables  · Take your medicines as directed. Do not stop taking the medicines, even if you feel better.  · Monitor your temperature and report any rising temperature to your healthcare provider.  · Take antibiotics exactly as directed. Do not miss any and keep taking them even if you feel better.   · Drink 6 to 8 glasses of water every day, unless directed otherwise.  · Use a heating pad or hot water bottle to reduce abdominal cramping or pain.  Preventing diverticulitis in the future  Tips for prevention include:  · Eat a high-fiber diet. Fiber adds bulk to the stool so that it passes through the large intestine more easily.  · Keep drinking 6 to 8 glasses of water every day, unless directed otherwise.  · Begin an exercise program. Ask your healthcare provider how to get started. You can benefit from simple activities such as walking or gardening.  · Treat diarrhea with a bland diet. Start with liquids only, then slowly add fiber over time.  · Watch for changes in your bowel  movements (constipation to diarrhea).  · Avoid constipation with fiber and add a stool softener if needed.   · Get plenty of rest and sleep.  Follow-up care  Make a follow-up appointment as directed by our staff.  When to call your healthcare provider  Call your healthcare provider immediately if you have any of the following:  · Fever of 100.4°F (38.0°C) or higher, or as directed by your healthcare provider  · Chills  · Severe cramps in the belly, most commonly the lower left side  · Tenderness in the belly, most commonly the lower left side  · Nausea and vomiting  · Bleeding from your rectum   Date Last Reviewed: 7/1/2016  © 3415-9956 [employer]. [address] [place] [address], [place] [address]. All rights reserved. This information is not intended as a substitute for professional medical care. Always follow your healthcare professional's instructions.

## 2020-06-16 NOTE — PROGRESS NOTES
Subjective:       Patient ID: Isha Leonard is a 65 y.o. female.    Chief Complaint: Abdominal Pain    LLQ pain see previous notes.  Symptoms started last week.  She has been on Augmentin.  Symptoms are better at present.  No current fever, chills or sweats.    Did not want CT scan    Diverticulitis reviewed.    Mammogram due, immunizations discussed.    Fatty liver, weight also reviewed.    Urine OK    Labs show high TSH, elevated WBC, elevated liver    Seen in Hepatology 11/19 0 fibrosis 3 steatosis    The 10-year ASCVD risk score (Lincoln OSVALDO JrJoann, et al., 2013) is: 5.9%    Values used to calculate the score:      Age: 65 years      Sex: Female      Is Non- : No      Diabetic: No      Tobacco smoker: No      Systolic Blood Pressure: 125 mmHg      Is BP treated: No      HDL Cholesterol: 57 mg/dL      Total Cholesterol: 205 mg/dL    Patient Active Problem List:     Other hyperlipidemia     Acquired hypothyroidism     AR (allergic rhinitis)     Glucose intolerance (impaired glucose tolerance)     Intramural leiomyoma of uterus     Gallstones: see ultrasound 2014; CT 2017, also 2019     Colon adenoma: 2011- also 3/17: 5 year follow up recommended     Fatty liver: see u/s 2014; stable CT 2017: fibroscan 2019 F0/4 fibrosis and S3 (>2/3 steatosis).     Midline cystocele     Rectus diastasis     Urinary, incontinence, stress female     Diverticulosis of large intestine without hemorrhage: see CT scan May 2017     Bilateral renal cysts: see CT scan May 2017     Osteopenia of multiple sites: see DEXA 2/18     Mild vitamin D deficiency     Helicobacter pylori ab+ in 2010, treated; negative stool 2/20     Gastroesophageal reflux disease      Review of Systems   Constitutional: Positive for fatigue. Negative for chills and fever.   HENT: Negative for congestion and postnasal drip.    Eyes: Negative.    Respiratory: Negative for cough, chest tightness, shortness of breath and wheezing.    Cardiovascular:  Negative.    Gastrointestinal: Positive for abdominal pain.        See above   Genitourinary: Negative for frequency and hematuria.   Musculoskeletal: Negative for arthralgias and back pain.   Psychiatric/Behavioral: Negative for dysphoric mood.       Objective:      Physical Exam  Constitutional:       Appearance: She is well-developed.   HENT:      Head: Normocephalic and atraumatic.      Right Ear: External ear normal.      Left Ear: External ear normal.   Eyes:      Extraocular Movements: Extraocular movements intact.      Conjunctiva/sclera: Conjunctivae normal.   Neck:      Musculoskeletal: Normal range of motion and neck supple.      Thyroid: No thyromegaly.   Cardiovascular:      Rate and Rhythm: Normal rate and regular rhythm.      Heart sounds: Normal heart sounds.   Pulmonary:      Effort: No respiratory distress.      Breath sounds: No wheezing or rales.   Abdominal:      General: Bowel sounds are normal. There is no distension.      Palpations: Abdomen is soft.      Tenderness: There is no abdominal tenderness.   Lymphadenopathy:      Cervical: No cervical adenopathy.   Skin:     General: Skin is warm and dry.      Findings: No erythema or rash.   Neurological:      Mental Status: She is alert and oriented to person, place, and time.         Assessment:       1. Left lower quadrant abdominal pain    2. Acquired hypothyroidism    3. Glucose intolerance (impaired glucose tolerance)    4. Mild vitamin D deficiency    5. Other hyperlipidemia    6. Leukocytosis, unspecified type    7. Elevated transaminase level    8. Diverticulitis    9. Screening mammogram, encounter for        Plan:           Isha was seen today for abdominal pain.    Diagnoses and all orders for this visit:    Left lower quadrant abdominal pain    Acquired hypothyroidism  -     TSH; Future    Glucose intolerance (impaired glucose tolerance)    Mild vitamin D deficiency    Other hyperlipidemia    Leukocytosis, unspecified type  -     CBC  auto differential; Future    Elevated transaminase level  -     Hepatic function panel; Future    Diverticulitis    Screening mammogram, encounter for  -     Mammo Digital Screening Bilat w/ Herman; Standing    Other orders  -     cholecalciferol, vitamin D3, (VITAMIN D3) 25 mcg (1,000 unit) capsule; Take 2 capsules (2,000 Units total) by mouth once daily.    Natural history of diverticulitis reviewed, alarm sx discussed, diet reviewed at length with patient and   CT if sx persist  Repeat labs  I will review all studies and determine further tx depending on findings

## 2020-06-24 NOTE — PATIENT INSTRUCTIONS
"Home Exercise Program: 03/27/2017    NELL WHILE SITTING  1. Sit comfortably with your feet touching the floor and your legs and tummy relaxed.   2. Breathe in AND relax your vaginal muscles.  3. Breathe out like through a straw AND squeeze your vaginal muscles.  4. Repeat 10 times, 3-4 sets per day. Spread throughout the day.    NELL WHILE STANDING  1. Stand comfortably, leaning back on the kitchen counter top with your hands supporting you. Let your tummy and legs relax.    2. Breathe in AND relax your vaginal muscles.  3. Breathe out like through a straw AND squeeze your vaginal muscles.  4. Repeat 10 times, 3-4 sets per day. Spread throughout the day.    FAST WALKING  30 minutes, x3/week.     FROM HEP2GO:  Tandem stance 3x45" bilat  "
calm

## 2020-07-04 RX ORDER — KETOCONAZOLE 20 MG/G
CREAM TOPICAL
Qty: 15 G | Refills: 2 | Status: SHIPPED | OUTPATIENT
Start: 2020-07-04 | End: 2022-03-08

## 2020-07-09 ENCOUNTER — PATIENT OUTREACH (OUTPATIENT)
Dept: ADMINISTRATIVE | Facility: HOSPITAL | Age: 65
End: 2020-07-09

## 2020-07-09 NOTE — PROGRESS NOTES
Health Maintenance Due   Topic Date Due    Shingles Vaccine (2 of 3) 09/13/2016    Mammogram  04/16/2020     Patient has an appointment for her mammogram scheduled on 7/16/2020.  Chart review completed.

## 2020-07-16 ENCOUNTER — LAB VISIT (OUTPATIENT)
Dept: LAB | Facility: HOSPITAL | Age: 65
End: 2020-07-16
Attending: INTERNAL MEDICINE
Payer: COMMERCIAL

## 2020-07-16 DIAGNOSIS — R74.01 ELEVATED TRANSAMINASE LEVEL: ICD-10-CM

## 2020-07-16 DIAGNOSIS — E03.9 ACQUIRED HYPOTHYROIDISM: ICD-10-CM

## 2020-07-16 DIAGNOSIS — D72.829 LEUKOCYTOSIS, UNSPECIFIED TYPE: ICD-10-CM

## 2020-07-16 LAB
ALBUMIN SERPL BCP-MCNC: 4.1 G/DL (ref 3.5–5.2)
ALP SERPL-CCNC: 79 U/L (ref 55–135)
ALT SERPL W/O P-5'-P-CCNC: 27 U/L (ref 10–44)
AST SERPL-CCNC: 24 U/L (ref 10–40)
BASOPHILS # BLD AUTO: 0.04 K/UL (ref 0–0.2)
BASOPHILS NFR BLD: 0.6 % (ref 0–1.9)
BILIRUB DIRECT SERPL-MCNC: 0.2 MG/DL (ref 0.1–0.3)
BILIRUB SERPL-MCNC: 0.8 MG/DL (ref 0.1–1)
DIFFERENTIAL METHOD: ABNORMAL
EOSINOPHIL # BLD AUTO: 0.1 K/UL (ref 0–0.5)
EOSINOPHIL NFR BLD: 1.6 % (ref 0–8)
ERYTHROCYTE [DISTWIDTH] IN BLOOD BY AUTOMATED COUNT: 12.6 % (ref 11.5–14.5)
HCT VFR BLD AUTO: 47.6 % (ref 37–48.5)
HGB BLD-MCNC: 14.2 G/DL (ref 12–16)
IMM GRANULOCYTES # BLD AUTO: 0.01 K/UL (ref 0–0.04)
IMM GRANULOCYTES NFR BLD AUTO: 0.1 % (ref 0–0.5)
LYMPHOCYTES # BLD AUTO: 3.1 K/UL (ref 1–4.8)
LYMPHOCYTES NFR BLD: 46.9 % (ref 18–48)
MCH RBC QN AUTO: 27.6 PG (ref 27–31)
MCHC RBC AUTO-ENTMCNC: 29.8 G/DL (ref 32–36)
MCV RBC AUTO: 92 FL (ref 82–98)
MONOCYTES # BLD AUTO: 0.4 K/UL (ref 0.3–1)
MONOCYTES NFR BLD: 5.5 % (ref 4–15)
NEUTROPHILS # BLD AUTO: 3 K/UL (ref 1.8–7.7)
NEUTROPHILS NFR BLD: 45.3 % (ref 38–73)
NRBC BLD-RTO: 0 /100 WBC
PLATELET # BLD AUTO: 329 K/UL (ref 150–350)
PMV BLD AUTO: 10 FL (ref 9.2–12.9)
PROT SERPL-MCNC: 7.6 G/DL (ref 6–8.4)
RBC # BLD AUTO: 5.15 M/UL (ref 4–5.4)
TSH SERPL DL<=0.005 MIU/L-ACNC: 2.06 UIU/ML (ref 0.4–4)
WBC # BLD AUTO: 6.68 K/UL (ref 3.9–12.7)

## 2020-07-16 PROCEDURE — 84443 ASSAY THYROID STIM HORMONE: CPT

## 2020-07-16 PROCEDURE — 85025 COMPLETE CBC W/AUTO DIFF WBC: CPT

## 2020-07-16 PROCEDURE — 80076 HEPATIC FUNCTION PANEL: CPT

## 2020-07-16 PROCEDURE — 36415 COLL VENOUS BLD VENIPUNCTURE: CPT | Mod: PO

## 2020-07-30 ENCOUNTER — HOSPITAL ENCOUNTER (OUTPATIENT)
Dept: RADIOLOGY | Facility: HOSPITAL | Age: 65
Discharge: HOME | End: 2020-07-30
Attending: INTERNAL MEDICINE
Payer: COMMERCIAL

## 2020-07-30 DIAGNOSIS — Z12.31 SCREENING MAMMOGRAM, ENCOUNTER FOR: ICD-10-CM

## 2020-07-30 PROCEDURE — 77067 MAMMO DIGITAL SCREENING BILAT WITH TOMOSYNTHESIS_CAD: ICD-10-PCS | Mod: 26,,, | Performed by: RADIOLOGY

## 2020-07-30 PROCEDURE — 77063 MAMMO DIGITAL SCREENING BILAT WITH TOMOSYNTHESIS_CAD: ICD-10-PCS | Mod: 26,,, | Performed by: RADIOLOGY

## 2020-07-30 PROCEDURE — 77063 BREAST TOMOSYNTHESIS BI: CPT | Mod: 26,,, | Performed by: RADIOLOGY

## 2020-07-30 PROCEDURE — 77067 SCR MAMMO BI INCL CAD: CPT | Mod: TC

## 2020-07-30 PROCEDURE — 77067 SCR MAMMO BI INCL CAD: CPT | Mod: 26,,, | Performed by: RADIOLOGY

## 2020-09-01 ENCOUNTER — TELEPHONE (OUTPATIENT)
Dept: INTERNAL MEDICINE | Facility: CLINIC | Age: 65
End: 2020-09-01

## 2020-09-01 NOTE — TELEPHONE ENCOUNTER
----- Message from Cortney Sharp sent at 9/1/2020 10:20 AM CDT -----  Regarding: Pt self Mobile [phone] or Home [phone]  Patient would like a call back in regards to her saying that she got a message about a flu shot that she is suppose to have and she would like a call back please.

## 2020-09-21 ENCOUNTER — IMMUNIZATION (OUTPATIENT)
Dept: PHARMACY | Facility: CLINIC | Age: 65
End: 2020-09-21
Payer: COMMERCIAL

## 2020-10-31 ENCOUNTER — PATIENT MESSAGE (OUTPATIENT)
Dept: PHARMACY | Facility: CLINIC | Age: 65
End: 2020-10-31

## 2020-11-02 ENCOUNTER — IMMUNIZATION (OUTPATIENT)
Dept: PHARMACY | Facility: CLINIC | Age: 65
End: 2020-11-02
Payer: COMMERCIAL

## 2020-11-19 RX ORDER — LEVOTHYROXINE SODIUM 125 UG/1
125 TABLET ORAL DAILY
Qty: 90 TABLET | Refills: 3 | Status: SHIPPED | OUTPATIENT
Start: 2020-11-19 | End: 2021-12-16

## 2020-11-22 ENCOUNTER — PATIENT OUTREACH (OUTPATIENT)
Dept: ADMINISTRATIVE | Facility: OTHER | Age: 65
End: 2020-11-22

## 2020-11-24 ENCOUNTER — OFFICE VISIT (OUTPATIENT)
Dept: OBSTETRICS AND GYNECOLOGY | Facility: CLINIC | Age: 65
End: 2020-11-24
Payer: COMMERCIAL

## 2020-11-24 VITALS
BODY MASS INDEX: 27.87 KG/M2 | SYSTOLIC BLOOD PRESSURE: 118 MMHG | HEIGHT: 62 IN | WEIGHT: 151.44 LBS | DIASTOLIC BLOOD PRESSURE: 80 MMHG

## 2020-11-24 DIAGNOSIS — N81.11 MIDLINE CYSTOCELE: ICD-10-CM

## 2020-11-24 DIAGNOSIS — N95.9 MENOPAUSAL AND PERIMENOPAUSAL DISORDER: ICD-10-CM

## 2020-11-24 DIAGNOSIS — Z01.419 VISIT FOR GYNECOLOGIC EXAMINATION: Primary | ICD-10-CM

## 2020-11-24 DIAGNOSIS — Z46.89 ENCOUNTER FOR FITTING AND ADJUSTMENT OF PESSARY: ICD-10-CM

## 2020-11-24 PROCEDURE — 99397 PR PREVENTIVE VISIT,EST,65 & OVER: ICD-10-PCS | Mod: S$GLB,,, | Performed by: OBSTETRICS & GYNECOLOGY

## 2020-11-24 PROCEDURE — 1126F AMNT PAIN NOTED NONE PRSNT: CPT | Mod: S$GLB,,, | Performed by: OBSTETRICS & GYNECOLOGY

## 2020-11-24 PROCEDURE — 1126F PR PAIN SEVERITY QUANTIFIED, NO PAIN PRESENT: ICD-10-PCS | Mod: S$GLB,,, | Performed by: OBSTETRICS & GYNECOLOGY

## 2020-11-24 PROCEDURE — 1101F PT FALLS ASSESS-DOCD LE1/YR: CPT | Mod: CPTII,S$GLB,, | Performed by: OBSTETRICS & GYNECOLOGY

## 2020-11-24 PROCEDURE — 3288F PR FALLS RISK ASSESSMENT DOCUMENTED: ICD-10-PCS | Mod: CPTII,S$GLB,, | Performed by: OBSTETRICS & GYNECOLOGY

## 2020-11-24 PROCEDURE — 3288F FALL RISK ASSESSMENT DOCD: CPT | Mod: CPTII,S$GLB,, | Performed by: OBSTETRICS & GYNECOLOGY

## 2020-11-24 PROCEDURE — 99999 PR PBB SHADOW E&M-EST. PATIENT-LVL III: ICD-10-PCS | Mod: PBBFAC,,, | Performed by: OBSTETRICS & GYNECOLOGY

## 2020-11-24 PROCEDURE — 99999 PR PBB SHADOW E&M-EST. PATIENT-LVL III: CPT | Mod: PBBFAC,,, | Performed by: OBSTETRICS & GYNECOLOGY

## 2020-11-24 PROCEDURE — 3008F PR BODY MASS INDEX (BMI) DOCUMENTED: ICD-10-PCS | Mod: CPTII,S$GLB,, | Performed by: OBSTETRICS & GYNECOLOGY

## 2020-11-24 PROCEDURE — 1101F PR PT FALLS ASSESS DOC 0-1 FALLS W/OUT INJ PAST YR: ICD-10-PCS | Mod: CPTII,S$GLB,, | Performed by: OBSTETRICS & GYNECOLOGY

## 2020-11-24 PROCEDURE — 99397 PER PM REEVAL EST PAT 65+ YR: CPT | Mod: S$GLB,,, | Performed by: OBSTETRICS & GYNECOLOGY

## 2020-11-24 PROCEDURE — 3008F BODY MASS INDEX DOCD: CPT | Mod: CPTII,S$GLB,, | Performed by: OBSTETRICS & GYNECOLOGY

## 2020-11-24 NOTE — PROGRESS NOTES
HISTORY OF PRESENT ILLNESS:    Isha Leonard is a 65 y.o. female , presents for a routine exam and has no complaints.  PAP NOT INDICATED AND MAMMO UP TO DATE.  CYSTOCELE MORE SYMPTOMATIC AND DISCUSSEDPESSARY - PRINTED PT INFO FROM [facility], AND IF PT DESIRES F/U TO UROGYN NP FOR FITTING.  UT STABLE SIZE    LAST 2019:   female , presents, WANTS CHECK OF FIBROID AND BLADDER.  DENIES PROBLEMS, ASKING ABOUT PESSARY AND ADVISED AGAINST UNLESS SYMPTOMATIC.  SINCE STABLE COULD CHECK USG FOR FIBROID, BUT WOULD NOT RECOMMEND INTERVENTION AT THIS POINT  LAST VISIT 2018:  DECIDED AGAINST PROLAPSE SURGERY LAST YEAR.  RECENT NL MAMMO AND DUE COTEST.  C/O ITCHING VULVA - AFFIRM, DEFERS VAGINAL MEDICATION SO LOTRISONE AND IF SX PERSIST ADVISED MONISTAT 3 SUPPOS.  NO CHANGE UT SIZE  Last visit 2018:    PAP DUE NEXT YEAR AND MAMMO.   SAW KNOEPP AND CONSIDERING SURGERY FOR UT FIBROIDS, PROLAPSE AND PLANNING ABDOMINOPLASTY AT THE SAME TIME.  RARE HUMZA AND MILD-MOD VAG PROLAPSE.  HAS APPT SET WITH [facility].  COUNSELED PT AND SON THAT DR LR CAN OPEN ABDOMEN, UROGYN CAN PERFORM AS MARIO BURT WITH RONQUILLO, SUSPENSION PROCEDURE, AND THEN PLASTIC SURGERY CAN COMPLETE THE ABDOMINOPLASTY.  DISCUSSED ELECTIVE NATURE OF BOTH PROCEDURES BUT WORTHWHILE IF SX MERIT.  ON EXAM MILD CYSTOCELE AND MILD RECTOCELE, UTERUS 12 WEEKS SIZE  LAST VISIT :  PAP DUE AND MAMMO IS UP TO DATE, LAST 2016.  NO SIGNIF HOT FLUSHES, AND RARE HUMZA.  HAS A HISTORY OF FIBROIDS AND WONDERS IF THEY ARE MAKING HER ABDOMEN DISTENDED.  ON EXAM ? 14 WEEKS SIZE BUT EXAM LIMITED - WILL REF FOR PELVIC USG TO BE SURE NO OVARIAN COMPONENT  LAST VISIT  JOSELINE:  Isha Leonard is a 58 y.o. female  presents for well woman exam. LMP: No LMP recorded. Patient is postmenopausal.. No issues, problems, or complaints.     Past Medical History:   Diagnosis Date    Anemia     AR (allergic rhinitis) 2013    Bilateral renal cysts: see CT scan May 2017  5/5/2017    Colon adenoma: 2011- repeat colonoscopy 12/16 7/19/2016    Colon polyp     Diverticulosis of large intestine without hemorrhage: see CT scan May 2017 5/5/2017    Fatty liver     GERD (gastroesophageal reflux disease)     Glucose intolerance (impaired glucose tolerance)     Hyperlipidemia     Hypertension     Mild vitamin D deficiency 3/19/2018    Pneumonia     Thyroid disease     Thyroid nodule: s/p R thyroidectomy; L side wnl 2014 2/17/2014    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       Past Surgical History:   Procedure Laterality Date    BREAST BIOPSY  2008    Left, benign    COLONOSCOPY N/A 3/3/2017    Procedure: COLONOSCOPY;  Surgeon: Mauri Richardson MD;  Location: [facility] ([address] [place] [address]);  Service: Endoscopy;  Laterality: N/A;  pt req Dr Richardson    COLONOSCOPY W/ POLYPECTOMY      THYROIDECTOMY, PARTIAL      secondary to thyroid nodule        MEDICATIONS AND ALLERGIES:      Current Outpatient Medications:     ammonium lactate 12 % Crea, Apply twice daily to affected parts both feet as needed., Disp: 140 g, Rfl: 11    cholecalciferol, vitamin D3, (VITAMIN D3) 25 mcg (1,000 unit) capsule, Take 2 capsules (2,000 Units total) by mouth once daily., Disp: 60 capsule, Rfl: 12    fluticasone propionate (FLONASE) 50 mcg/actuation nasal spray, INSTILL 2 SPRAYS (100 MCG TOTAL) INTO EACH NOSTRIL ONCE DAILY., Disp: 16 mL, Rfl: 1    ketoconazole (NIZORAL) 2 % cream, APPLY TO AFFECTED AREA EVERY DAY, Disp: 15 g, Rfl: 2    levothyroxine (SYNTHROID) 125 MCG tablet, Take 1 tablet (125 mcg total) by mouth once daily., Disp: 90 tablet, Rfl: 3    sodium chloride (SALINE NASAL) 0.65 % nasal spray, 1 spray by Nasal route as needed for Congestion., Disp: 50 mL, Rfl: 12    varicella-zoster [id number], PF, (SHINGRIX, PF,) 50 mcg/0.5 mL injection, Inject into the muscle., Disp: 1 each, Rfl: 1    Review of patient's allergies indicates:  No Known Allergies    Family History   Problem Relation Age of Onset    Osteoporosis  Mother     Diabetes Mother     Hypertension Mother     Heart disease Mother     Cancer Father         Bladder cancer    Diabetes Sister     No Known Problems Daughter     No Known Problems Son     No Known Problems Daughter     Cancer Brother     Breast cancer Neg Hx     Colon cancer Neg Hx     Eclampsia Neg Hx     Miscarriages / Stillbirths Neg Hx     Ovarian cancer Neg Hx      labor Neg Hx     Stroke Neg Hx     Cervical cancer Neg Hx     Endometrial cancer Neg Hx     Vaginal cancer Neg Hx        Social History     Socioeconomic History    Marital status:      Spouse name: Not on file    Number of children: 3    Years of education: Not on file    Highest education level: Not on file   Occupational History    Occupation: Teacher   Social Needs    Financial resource strain: Not on file    Food insecurity     Worry: Not on file     Inability: Not on file    Transportation needs     Medical: Not on file     Non-medical: Not on file   Tobacco Use    Smoking status: Never Smoker    Smokeless tobacco: Never Used   Substance and Sexual Activity    Alcohol use: No    Drug use: No    Sexual activity: Yes     Partners: Male     Birth control/protection: Post-menopausal   Lifestyle    Physical activity     Days per week: Not on file     Minutes per session: Not on file    Stress: Not on file   Relationships    Social connections     Talks on phone: Not on file     Gets together: Not on file     Attends [employer] service: Not on file     Active member of club or organization: Not on file     Attends meetings of clubs or organizations: Not on file     Relationship status: Not on file   Other Topics Concern    Are you pregnant or think you may be? No    Breast-feeding No   Social History Narrative    Not on file       COMPREHENSIVE GYN HISTORY:  PAP History:  Denies abnormal Paps except a noted above.  Infection History: Denies STDs. Denies PID.  Benign History: Denies uterine  "fibroids. Denies ovarian cysts. Denies endometriosis.  Denies other conditions.  Cancer History: Denies cervical cancer. Denies uterine cancer or hyperplasia. Denies ovarian cancer. Denies vulvar cancer or pre-cancer. Denies vaginal cancer or pre-cancer. Denies breast cancer. Denies colon cancer.    ROS:  GENERAL: No weight changes. No swelling. No fatigue. No fever.  CARDIOVASCULAR: No chest pain. No shortness of breath. No leg cramps.   NEUROLOGICAL: No headaches. No vision changes.  BREASTS: No pain. No lumps. No discharge.  ABDOMEN: No pain. No nausea. No vomiting. No diarrhea. No constipation.  REPRODUCTIVE: No abnormal bleeding.   VULVA: No pain. No lesions. No itching.  VAGINA: No relaxation. No itching. No odor. No discharge. No lesions.  URINARY: No incontinence. No nocturia. No frequency. No dysuria.    /80   Ht 5' 2" (1.575 m)   Wt 68.7 kg (151 lb 7.3 oz)   BMI 27.70 kg/m²     PE:  APPEARANCE: Well nourished, well developed, in no acute distress.  AFFECT: WNL, alert and oriented x 3.  SKIN: No hirsutism or acne.  NECK: Neck symmetric without masses or thyromegaly.  NODES: No inguinal, cervical, axillary or femoral lymph node enlargement.  CHEST: Good respiratory effort.   ABDOMEN: Soft. No tenderness or masses. No hepatosplenomegaly. No hernias.  BREASTS: Symmetrical, no skin changes or visible lesions. No palpable masses, nipple discharge bilaterally.  PELVIC: ATROPHIC EXTERNAL FEMALE GENITALIA without lesions. Normal hair distribution. Adequate perineal body, normal urethral meatus. VAGINA DRY without lesions or discharge. CERVIX STENOTIC without lesions, discharge or tenderness.  3RD DEGREE CYSTOCELE TO INTROITUS WITH VALSALVA AND MILD RECTOCELE, UTERUS 12 WEEKS SIZE, regular, mobile and nontender. Adnexa without masses or tenderness.  EXTREMITIES: No edema.    PROCEDURES:      DIAGNOSIS:  1. Visit for gynecologic examination     2. Menopausal and perimenopausal disorder     3. Midline " cystocele  Ambulatory referral/consult to Urogynecology   4. Encounter for fitting and adjustment of pessary  Ambulatory referral/consult to Urogynecology       PLAN:    LABS AND TESTS ORDERED:  Mammogram    COUNSELING:  The patient was counseled today on osteoporosis prevention, calcium supplementation, and regular weight bearing exercise. The patient was also counseled today on ACS PAP guidelines, with recommendations for yearly pelvic exams unless their uterus, cervix, and ovaries were removed for benign reasons; in that case, examinations every 3-5 years are recommended.  The patient was also counseled regarding monthly breast self-examination, routine STD screening for at-risk populations, prophylactic immunizations for transmitted infections such as  HPV, Pertussis, or Influenza as appropriate, and yearly mammograms when indicated by ACS guidelines.  She was advised to see her primary care physician for all other health maintenance.    FOLLOW-UP with me annually.

## 2020-12-11 ENCOUNTER — TELEPHONE (OUTPATIENT)
Dept: INTERNAL MEDICINE | Facility: CLINIC | Age: 65
End: 2020-12-11

## 2020-12-11 NOTE — TELEPHONE ENCOUNTER
----- Message from Trisha Dueñas sent at 12/11/2020  4:04 PM CST -----  Contact: [phone]  Patient is requesting a call back from the nurse to find out more about the Covid-19 vaccine. Patient states the news are saying vaccines should be available this week. Please call and advise

## 2020-12-11 NOTE — TELEPHONE ENCOUNTER
Pt informed that vaccine will not be available to the public until mid-March, she is aware that they will receive a notification via pt portal when vaccine is ready.     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

## 2020-12-13 ENCOUNTER — PATIENT MESSAGE (OUTPATIENT)
Dept: OBSTETRICS AND GYNECOLOGY | Facility: CLINIC | Age: 65
End: 2020-12-13

## 2020-12-15 ENCOUNTER — IMMUNIZATION (OUTPATIENT)
Dept: PHARMACY | Facility: CLINIC | Age: 65
End: 2020-12-15
Payer: COMMERCIAL

## 2020-12-15 ENCOUNTER — IMMUNIZATION (OUTPATIENT)
Dept: PHARMACY | Facility: CLINIC | Age: 65
End: 2020-12-15

## 2020-12-16 ENCOUNTER — TELEPHONE (OUTPATIENT)
Dept: UROGYNECOLOGY | Facility: CLINIC | Age: 65
End: 2020-12-16

## 2020-12-16 NOTE — TELEPHONE ENCOUNTER
----- Message from Jennie Wilkerson sent at 12/16/2020  3:16 PM CST -----  Regarding: Requesting a call back  Pt calling to request an doctor for visit for February 2, 2021 please if possible referral in system.    Pt can be reached at [phone]      Thank you!

## 2020-12-16 NOTE — TELEPHONE ENCOUNTER
Returned pt call no answer, Left voice message for pt to give the office a call back at [phone].

## 2021-03-02 ENCOUNTER — INITIAL CONSULT (OUTPATIENT)
Dept: UROGYNECOLOGY | Facility: CLINIC | Age: 66
End: 2021-03-02
Payer: MEDICARE

## 2021-03-02 VITALS
WEIGHT: 154 LBS | SYSTOLIC BLOOD PRESSURE: 120 MMHG | HEIGHT: 62 IN | BODY MASS INDEX: 28.34 KG/M2 | DIASTOLIC BLOOD PRESSURE: 60 MMHG

## 2021-03-02 DIAGNOSIS — N95.2 VAGINAL ATROPHY: ICD-10-CM

## 2021-03-02 DIAGNOSIS — K64.9 HEMORRHOIDS, UNSPECIFIED HEMORRHOID TYPE: ICD-10-CM

## 2021-03-02 DIAGNOSIS — Z46.89 ENCOUNTER FOR FITTING AND ADJUSTMENT OF PESSARY: ICD-10-CM

## 2021-03-02 DIAGNOSIS — N81.11 MIDLINE CYSTOCELE: Primary | ICD-10-CM

## 2021-03-02 DIAGNOSIS — N39.46 MIXED INCONTINENCE URGE AND STRESS: ICD-10-CM

## 2021-03-02 PROCEDURE — 1159F MED LIST DOCD IN RCRD: CPT | Mod: S$GLB,,, | Performed by: NURSE PRACTITIONER

## 2021-03-02 PROCEDURE — 99999 PR PBB SHADOW E&M-EST. PATIENT-LVL III: ICD-10-PCS | Mod: PBBFAC,,, | Performed by: NURSE PRACTITIONER

## 2021-03-02 PROCEDURE — 57160 INSERT PESSARY/OTHER DEVICE: CPT | Mod: S$GLB,,, | Performed by: NURSE PRACTITIONER

## 2021-03-02 PROCEDURE — 99999 PR PBB SHADOW E&M-EST. PATIENT-LVL III: CPT | Mod: PBBFAC,,, | Performed by: NURSE PRACTITIONER

## 2021-03-02 PROCEDURE — 57160 PR FIT/INSERT INTRAVAG SUPPORT DEVICE: ICD-10-PCS | Mod: S$GLB,,, | Performed by: NURSE PRACTITIONER

## 2021-03-02 PROCEDURE — 99214 PR OFFICE/OUTPT VISIT, EST, LEVL IV, 30-39 MIN: ICD-10-PCS | Mod: 25,S$GLB,, | Performed by: NURSE PRACTITIONER

## 2021-03-02 PROCEDURE — 87086 URINE CULTURE/COLONY COUNT: CPT

## 2021-03-02 PROCEDURE — 99214 OFFICE O/P EST MOD 30 MIN: CPT | Mod: 25,S$GLB,, | Performed by: NURSE PRACTITIONER

## 2021-03-02 PROCEDURE — 1159F PR MEDICATION LIST DOCUMENTED IN MEDICAL RECORD: ICD-10-PCS | Mod: S$GLB,,, | Performed by: NURSE PRACTITIONER

## 2021-03-02 RX ORDER — ESTRADIOL 0.1 MG/G
1 CREAM VAGINAL
Qty: 42.5 G | Refills: 3 | Status: SHIPPED | OUTPATIENT
Start: 2021-03-04 | End: 2022-03-08 | Stop reason: SDUPTHER

## 2021-03-03 LAB — BACTERIA UR CULT: NO GROWTH

## 2021-03-04 ENCOUNTER — PATIENT MESSAGE (OUTPATIENT)
Dept: UROGYNECOLOGY | Facility: CLINIC | Age: 66
End: 2021-03-04

## 2021-03-18 ENCOUNTER — PATIENT MESSAGE (OUTPATIENT)
Dept: PHARMACY | Facility: CLINIC | Age: 66
End: 2021-03-18

## 2021-04-09 ENCOUNTER — TELEPHONE (OUTPATIENT)
Dept: INTERNAL MEDICINE | Facility: CLINIC | Age: 66
End: 2021-04-09

## 2021-04-09 DIAGNOSIS — Z23 NEED FOR SHINGLES VACCINE: Primary | ICD-10-CM

## 2021-04-12 ENCOUNTER — OFFICE VISIT (OUTPATIENT)
Dept: INTERNAL MEDICINE | Facility: CLINIC | Age: 66
End: 2021-04-12
Payer: MEDICARE

## 2021-04-12 ENCOUNTER — TELEPHONE (OUTPATIENT)
Dept: INTERNAL MEDICINE | Facility: CLINIC | Age: 66
End: 2021-04-12

## 2021-04-12 DIAGNOSIS — K21.9 GASTROESOPHAGEAL REFLUX DISEASE, UNSPECIFIED WHETHER ESOPHAGITIS PRESENT: ICD-10-CM

## 2021-04-12 DIAGNOSIS — E53.8 LOW SERUM VITAMIN B12: ICD-10-CM

## 2021-04-12 DIAGNOSIS — Z12.31 SCREENING MAMMOGRAM, ENCOUNTER FOR: ICD-10-CM

## 2021-04-12 DIAGNOSIS — N28.1 BILATERAL RENAL CYSTS: ICD-10-CM

## 2021-04-12 DIAGNOSIS — K76.0 FATTY LIVER: ICD-10-CM

## 2021-04-12 DIAGNOSIS — R73.02 GLUCOSE INTOLERANCE (IMPAIRED GLUCOSE TOLERANCE): ICD-10-CM

## 2021-04-12 DIAGNOSIS — E78.49 OTHER HYPERLIPIDEMIA: ICD-10-CM

## 2021-04-12 DIAGNOSIS — K76.0 FATTY LIVER: Primary | ICD-10-CM

## 2021-04-12 DIAGNOSIS — K12.1 MOUTH ULCER: Primary | ICD-10-CM

## 2021-04-12 DIAGNOSIS — K80.20 GALLSTONES: ICD-10-CM

## 2021-04-12 DIAGNOSIS — E03.9 ACQUIRED HYPOTHYROIDISM: ICD-10-CM

## 2021-04-12 DIAGNOSIS — M85.89 OSTEOPENIA OF MULTIPLE SITES: ICD-10-CM

## 2021-04-12 DIAGNOSIS — E55.9 MILD VITAMIN D DEFICIENCY: ICD-10-CM

## 2021-04-12 DIAGNOSIS — M81.0 OSTEOPOROSIS WITHOUT CURRENT PATHOLOGICAL FRACTURE, UNSPECIFIED OSTEOPOROSIS TYPE: ICD-10-CM

## 2021-04-12 PROCEDURE — 99214 OFFICE O/P EST MOD 30 MIN: CPT | Mod: 95,,, | Performed by: INTERNAL MEDICINE

## 2021-04-12 PROCEDURE — 99499 RISK ADDL DX/OHS AUDIT: ICD-10-PCS | Mod: 95,,, | Performed by: INTERNAL MEDICINE

## 2021-04-12 PROCEDURE — 99499 UNLISTED E&M SERVICE: CPT | Mod: 95,,, | Performed by: INTERNAL MEDICINE

## 2021-04-12 PROCEDURE — 1159F PR MEDICATION LIST DOCUMENTED IN MEDICAL RECORD: ICD-10-PCS | Mod: 95,,, | Performed by: INTERNAL MEDICINE

## 2021-04-12 PROCEDURE — 1159F MED LIST DOCD IN RCRD: CPT | Mod: 95,,, | Performed by: INTERNAL MEDICINE

## 2021-04-12 PROCEDURE — 99214 PR OFFICE/OUTPT VISIT, EST, LEVL IV, 30-39 MIN: ICD-10-PCS | Mod: 95,,, | Performed by: INTERNAL MEDICINE

## 2021-04-12 RX ORDER — VIT C/E/ZN/COPPR/LUTEIN/ZEAXAN 250MG-90MG
1000 CAPSULE ORAL DAILY
Qty: 30 CAPSULE | Refills: 12
Start: 2021-04-12

## 2021-04-12 RX ORDER — ACYCLOVIR 400 MG/1
400 TABLET ORAL 3 TIMES DAILY
Qty: 21 TABLET | Refills: 0 | Status: SHIPPED | OUTPATIENT
Start: 2021-04-12 | End: 2021-04-12

## 2021-04-12 RX ORDER — ZOSTER VACCINE RECOMBINANT, ADJUVANTED 50 MCG/0.5
0.5 KIT INTRAMUSCULAR ONCE
Qty: 1 EACH | Refills: 0 | Status: SHIPPED | OUTPATIENT
Start: 2021-04-12 | End: 2021-04-12

## 2021-04-12 RX ORDER — ACYCLOVIR 50 MG/G
OINTMENT TOPICAL
Qty: 30 G | Refills: 1 | Status: SHIPPED | OUTPATIENT
Start: 2021-04-12 | End: 2022-03-08

## 2021-04-13 ENCOUNTER — OFFICE VISIT (OUTPATIENT)
Dept: UROGYNECOLOGY | Facility: CLINIC | Age: 66
End: 2021-04-13
Payer: MEDICARE

## 2021-04-13 VITALS
DIASTOLIC BLOOD PRESSURE: 80 MMHG | WEIGHT: 154.13 LBS | HEIGHT: 62 IN | SYSTOLIC BLOOD PRESSURE: 120 MMHG | BODY MASS INDEX: 28.36 KG/M2

## 2021-04-13 DIAGNOSIS — N39.46 MIXED INCONTINENCE URGE AND STRESS: ICD-10-CM

## 2021-04-13 DIAGNOSIS — N95.2 VAGINAL ATROPHY: ICD-10-CM

## 2021-04-13 DIAGNOSIS — Z46.89 PESSARY MAINTENANCE: ICD-10-CM

## 2021-04-13 DIAGNOSIS — N81.11 MIDLINE CYSTOCELE: Primary | ICD-10-CM

## 2021-04-13 PROCEDURE — 3008F PR BODY MASS INDEX (BMI) DOCUMENTED: ICD-10-PCS | Mod: CPTII,S$GLB,, | Performed by: NURSE PRACTITIONER

## 2021-04-13 PROCEDURE — 1159F PR MEDICATION LIST DOCUMENTED IN MEDICAL RECORD: ICD-10-PCS | Mod: S$GLB,,, | Performed by: NURSE PRACTITIONER

## 2021-04-13 PROCEDURE — 3288F PR FALLS RISK ASSESSMENT DOCUMENTED: ICD-10-PCS | Mod: CPTII,S$GLB,, | Performed by: NURSE PRACTITIONER

## 2021-04-13 PROCEDURE — 1101F PR PT FALLS ASSESS DOC 0-1 FALLS W/OUT INJ PAST YR: ICD-10-PCS | Mod: CPTII,S$GLB,, | Performed by: NURSE PRACTITIONER

## 2021-04-13 PROCEDURE — 1101F PT FALLS ASSESS-DOCD LE1/YR: CPT | Mod: CPTII,S$GLB,, | Performed by: NURSE PRACTITIONER

## 2021-04-13 PROCEDURE — 3288F FALL RISK ASSESSMENT DOCD: CPT | Mod: CPTII,S$GLB,, | Performed by: NURSE PRACTITIONER

## 2021-04-13 PROCEDURE — 1126F AMNT PAIN NOTED NONE PRSNT: CPT | Mod: S$GLB,,, | Performed by: NURSE PRACTITIONER

## 2021-04-13 PROCEDURE — 99214 PR OFFICE/OUTPT VISIT, EST, LEVL IV, 30-39 MIN: ICD-10-PCS | Mod: S$GLB,,, | Performed by: NURSE PRACTITIONER

## 2021-04-13 PROCEDURE — 1159F MED LIST DOCD IN RCRD: CPT | Mod: S$GLB,,, | Performed by: NURSE PRACTITIONER

## 2021-04-13 PROCEDURE — 99999 PR PBB SHADOW E&M-EST. PATIENT-LVL III: ICD-10-PCS | Mod: PBBFAC,,, | Performed by: NURSE PRACTITIONER

## 2021-04-13 PROCEDURE — 3008F BODY MASS INDEX DOCD: CPT | Mod: CPTII,S$GLB,, | Performed by: NURSE PRACTITIONER

## 2021-04-13 PROCEDURE — 99999 PR PBB SHADOW E&M-EST. PATIENT-LVL III: CPT | Mod: PBBFAC,,, | Performed by: NURSE PRACTITIONER

## 2021-04-13 PROCEDURE — 1126F PR PAIN SEVERITY QUANTIFIED, NO PAIN PRESENT: ICD-10-PCS | Mod: S$GLB,,, | Performed by: NURSE PRACTITIONER

## 2021-04-13 PROCEDURE — 99214 OFFICE O/P EST MOD 30 MIN: CPT | Mod: S$GLB,,, | Performed by: NURSE PRACTITIONER

## 2021-04-14 ENCOUNTER — OFFICE VISIT (OUTPATIENT)
Dept: OBSTETRICS AND GYNECOLOGY | Facility: CLINIC | Age: 66
End: 2021-04-14
Payer: MEDICARE

## 2021-04-14 ENCOUNTER — IMMUNIZATION (OUTPATIENT)
Dept: PHARMACY | Facility: CLINIC | Age: 66
End: 2021-04-14
Payer: MEDICARE

## 2021-04-14 ENCOUNTER — TELEPHONE (OUTPATIENT)
Dept: HEPATOLOGY | Facility: CLINIC | Age: 66
End: 2021-04-14

## 2021-04-14 VITALS
BODY MASS INDEX: 28.41 KG/M2 | WEIGHT: 155.31 LBS | DIASTOLIC BLOOD PRESSURE: 78 MMHG | SYSTOLIC BLOOD PRESSURE: 114 MMHG

## 2021-04-14 DIAGNOSIS — L73.9 FOLLICULITIS: ICD-10-CM

## 2021-04-14 DIAGNOSIS — N81.2 PROLAPSED, UTEROVAGINAL, INCOMPLETE: Primary | ICD-10-CM

## 2021-04-14 PROCEDURE — 3008F PR BODY MASS INDEX (BMI) DOCUMENTED: ICD-10-PCS | Mod: CPTII,S$GLB,, | Performed by: OBSTETRICS & GYNECOLOGY

## 2021-04-14 PROCEDURE — 99999 PR PBB SHADOW E&M-EST. PATIENT-LVL III: ICD-10-PCS | Mod: PBBFAC,,, | Performed by: OBSTETRICS & GYNECOLOGY

## 2021-04-14 PROCEDURE — 1101F PR PT FALLS ASSESS DOC 0-1 FALLS W/OUT INJ PAST YR: ICD-10-PCS | Mod: CPTII,S$GLB,, | Performed by: OBSTETRICS & GYNECOLOGY

## 2021-04-14 PROCEDURE — 1159F PR MEDICATION LIST DOCUMENTED IN MEDICAL RECORD: ICD-10-PCS | Mod: S$GLB,,, | Performed by: OBSTETRICS & GYNECOLOGY

## 2021-04-14 PROCEDURE — 1126F PR PAIN SEVERITY QUANTIFIED, NO PAIN PRESENT: ICD-10-PCS | Mod: S$GLB,,, | Performed by: OBSTETRICS & GYNECOLOGY

## 2021-04-14 PROCEDURE — 1126F AMNT PAIN NOTED NONE PRSNT: CPT | Mod: S$GLB,,, | Performed by: OBSTETRICS & GYNECOLOGY

## 2021-04-14 PROCEDURE — 99212 OFFICE O/P EST SF 10 MIN: CPT | Mod: S$GLB,,, | Performed by: OBSTETRICS & GYNECOLOGY

## 2021-04-14 PROCEDURE — 3008F BODY MASS INDEX DOCD: CPT | Mod: CPTII,S$GLB,, | Performed by: OBSTETRICS & GYNECOLOGY

## 2021-04-14 PROCEDURE — 3288F PR FALLS RISK ASSESSMENT DOCUMENTED: ICD-10-PCS | Mod: CPTII,S$GLB,, | Performed by: OBSTETRICS & GYNECOLOGY

## 2021-04-14 PROCEDURE — 1101F PT FALLS ASSESS-DOCD LE1/YR: CPT | Mod: CPTII,S$GLB,, | Performed by: OBSTETRICS & GYNECOLOGY

## 2021-04-14 PROCEDURE — 99212 PR OFFICE/OUTPT VISIT, EST, LEVL II, 10-19 MIN: ICD-10-PCS | Mod: S$GLB,,, | Performed by: OBSTETRICS & GYNECOLOGY

## 2021-04-14 PROCEDURE — 1159F MED LIST DOCD IN RCRD: CPT | Mod: S$GLB,,, | Performed by: OBSTETRICS & GYNECOLOGY

## 2021-04-14 PROCEDURE — 99999 PR PBB SHADOW E&M-EST. PATIENT-LVL III: CPT | Mod: PBBFAC,,, | Performed by: OBSTETRICS & GYNECOLOGY

## 2021-04-14 PROCEDURE — 3288F FALL RISK ASSESSMENT DOCD: CPT | Mod: CPTII,S$GLB,, | Performed by: OBSTETRICS & GYNECOLOGY

## 2021-04-15 ENCOUNTER — LAB VISIT (OUTPATIENT)
Dept: LAB | Facility: HOSPITAL | Age: 66
End: 2021-04-15
Attending: INTERNAL MEDICINE
Payer: MEDICARE

## 2021-04-15 ENCOUNTER — PATIENT MESSAGE (OUTPATIENT)
Dept: INTERNAL MEDICINE | Facility: CLINIC | Age: 66
End: 2021-04-15

## 2021-04-15 DIAGNOSIS — E55.9 MILD VITAMIN D DEFICIENCY: ICD-10-CM

## 2021-04-15 DIAGNOSIS — E03.9 ACQUIRED HYPOTHYROIDISM: ICD-10-CM

## 2021-04-15 DIAGNOSIS — R73.02 GLUCOSE INTOLERANCE (IMPAIRED GLUCOSE TOLERANCE): ICD-10-CM

## 2021-04-15 DIAGNOSIS — E78.49 OTHER HYPERLIPIDEMIA: ICD-10-CM

## 2021-04-15 DIAGNOSIS — E53.8 LOW SERUM VITAMIN B12: ICD-10-CM

## 2021-04-15 LAB
25(OH)D3+25(OH)D2 SERPL-MCNC: 42 NG/ML (ref 30–96)
ALBUMIN SERPL BCP-MCNC: 3.7 G/DL (ref 3.5–5.2)
ALP SERPL-CCNC: 74 U/L (ref 55–135)
ALT SERPL W/O P-5'-P-CCNC: 25 U/L (ref 10–44)
ANION GAP SERPL CALC-SCNC: 8 MMOL/L (ref 8–16)
AST SERPL-CCNC: 23 U/L (ref 10–40)
BASOPHILS # BLD AUTO: 0.04 K/UL (ref 0–0.2)
BASOPHILS NFR BLD: 0.6 % (ref 0–1.9)
BILIRUB SERPL-MCNC: 0.9 MG/DL (ref 0.1–1)
BUN SERPL-MCNC: 15 MG/DL (ref 8–23)
CALCIUM SERPL-MCNC: 8.9 MG/DL (ref 8.7–10.5)
CHLORIDE SERPL-SCNC: 106 MMOL/L (ref 95–110)
CHOLEST SERPL-MCNC: 215 MG/DL (ref 120–199)
CHOLEST/HDLC SERPL: 4.3 {RATIO} (ref 2–5)
CO2 SERPL-SCNC: 24 MMOL/L (ref 23–29)
CREAT SERPL-MCNC: 0.8 MG/DL (ref 0.5–1.4)
DIFFERENTIAL METHOD: NORMAL
EOSINOPHIL # BLD AUTO: 0.2 K/UL (ref 0–0.5)
EOSINOPHIL NFR BLD: 2.3 % (ref 0–8)
ERYTHROCYTE [DISTWIDTH] IN BLOOD BY AUTOMATED COUNT: 12.6 % (ref 11.5–14.5)
EST. GFR  (AFRICAN AMERICAN): >60 ML/MIN/1.73 M^2
EST. GFR  (NON AFRICAN AMERICAN): >60 ML/MIN/1.73 M^2
ESTIMATED AVG GLUCOSE: 120 MG/DL (ref 68–131)
GLUCOSE SERPL-MCNC: 107 MG/DL (ref 70–110)
HBA1C MFR BLD: 5.8 % (ref 4–5.6)
HCT VFR BLD AUTO: 43.8 % (ref 37–48.5)
HDLC SERPL-MCNC: 50 MG/DL (ref 40–75)
HDLC SERPL: 23.3 % (ref 20–50)
HGB BLD-MCNC: 14.1 G/DL (ref 12–16)
IMM GRANULOCYTES # BLD AUTO: 0.01 K/UL (ref 0–0.04)
IMM GRANULOCYTES NFR BLD AUTO: 0.1 % (ref 0–0.5)
LDLC SERPL CALC-MCNC: 132.6 MG/DL (ref 63–159)
LYMPHOCYTES # BLD AUTO: 3 K/UL (ref 1–4.8)
LYMPHOCYTES NFR BLD: 43.8 % (ref 18–48)
MCH RBC QN AUTO: 29.1 PG (ref 27–31)
MCHC RBC AUTO-ENTMCNC: 32.2 G/DL (ref 32–36)
MCV RBC AUTO: 90 FL (ref 82–98)
MONOCYTES # BLD AUTO: 0.6 K/UL (ref 0.3–1)
MONOCYTES NFR BLD: 8.6 % (ref 4–15)
NEUTROPHILS # BLD AUTO: 3 K/UL (ref 1.8–7.7)
NEUTROPHILS NFR BLD: 44.6 % (ref 38–73)
NONHDLC SERPL-MCNC: 165 MG/DL
NRBC BLD-RTO: 0 /100 WBC
PLATELET # BLD AUTO: 299 K/UL (ref 150–450)
PMV BLD AUTO: 10.4 FL (ref 9.2–12.9)
POTASSIUM SERPL-SCNC: 4.3 MMOL/L (ref 3.5–5.1)
PROT SERPL-MCNC: 7.6 G/DL (ref 6–8.4)
RBC # BLD AUTO: 4.85 M/UL (ref 4–5.4)
SODIUM SERPL-SCNC: 138 MMOL/L (ref 136–145)
TRIGL SERPL-MCNC: 162 MG/DL (ref 30–150)
TSH SERPL DL<=0.005 MIU/L-ACNC: 3.46 UIU/ML (ref 0.4–4)
VIT B12 SERPL-MCNC: 461 PG/ML (ref 210–950)
WBC # BLD AUTO: 6.83 K/UL (ref 3.9–12.7)

## 2021-04-15 PROCEDURE — 82306 VITAMIN D 25 HYDROXY: CPT | Performed by: INTERNAL MEDICINE

## 2021-04-15 PROCEDURE — 84443 ASSAY THYROID STIM HORMONE: CPT | Performed by: INTERNAL MEDICINE

## 2021-04-15 PROCEDURE — 36415 COLL VENOUS BLD VENIPUNCTURE: CPT | Mod: PO | Performed by: INTERNAL MEDICINE

## 2021-04-15 PROCEDURE — 80053 COMPREHEN METABOLIC PANEL: CPT | Performed by: INTERNAL MEDICINE

## 2021-04-15 PROCEDURE — 85025 COMPLETE CBC W/AUTO DIFF WBC: CPT | Performed by: INTERNAL MEDICINE

## 2021-04-15 PROCEDURE — 80061 LIPID PANEL: CPT | Performed by: INTERNAL MEDICINE

## 2021-04-15 PROCEDURE — 83036 HEMOGLOBIN GLYCOSYLATED A1C: CPT | Performed by: INTERNAL MEDICINE

## 2021-04-15 PROCEDURE — 82607 VITAMIN B-12: CPT | Performed by: INTERNAL MEDICINE

## 2021-04-24 ENCOUNTER — TELEPHONE (OUTPATIENT)
Dept: HEPATOLOGY | Facility: CLINIC | Age: 66
End: 2021-04-24

## 2021-08-04 ENCOUNTER — TELEPHONE (OUTPATIENT)
Dept: INTERNAL MEDICINE | Facility: CLINIC | Age: 66
End: 2021-08-04

## 2021-08-04 DIAGNOSIS — M81.0 OSTEOPOROSIS WITHOUT CURRENT PATHOLOGICAL FRACTURE, UNSPECIFIED OSTEOPOROSIS TYPE: Primary | ICD-10-CM

## 2021-10-18 ENCOUNTER — TELEPHONE (OUTPATIENT)
Dept: INTERNAL MEDICINE | Facility: CLINIC | Age: 66
End: 2021-10-18

## 2021-10-18 DIAGNOSIS — H92.09 DISCOMFORT OF EAR, UNSPECIFIED LATERALITY: Primary | ICD-10-CM

## 2021-12-16 RX ORDER — LEVOTHYROXINE SODIUM 125 UG/1
125 TABLET ORAL DAILY
Qty: 90 TABLET | Refills: 1 | Status: SHIPPED | OUTPATIENT
Start: 2021-12-16 | End: 2022-11-17

## 2022-01-20 ENCOUNTER — TELEPHONE (OUTPATIENT)
Dept: INTERNAL MEDICINE | Facility: CLINIC | Age: 67
End: 2022-01-20
Payer: MEDICARE

## 2022-02-23 ENCOUNTER — PATIENT MESSAGE (OUTPATIENT)
Dept: INTERNAL MEDICINE | Facility: CLINIC | Age: 67
End: 2022-02-23
Payer: MEDICARE

## 2022-02-23 DIAGNOSIS — J06.9 UPPER RESPIRATORY TRACT INFECTION, UNSPECIFIED TYPE: Primary | ICD-10-CM

## 2022-02-24 NOTE — TELEPHONE ENCOUNTER
referral was done on 10/18/2021 see OLE Larsen    10:03 AM  Note     Spoke with pt daughter Aida and referral faxed to her fax number @ [phone]        October 18, 2021    Cinthia Ayers MD  to [facility] Staff          5:09 PM  External order placed, please fax to clinic requested

## 2022-02-28 NOTE — TELEPHONE ENCOUNTER
Please fax to external referrals as well as the letter that I completed today to Kristopher and let her know this was done today thanks

## 2022-03-08 ENCOUNTER — OFFICE VISIT (OUTPATIENT)
Dept: INTERNAL MEDICINE | Facility: CLINIC | Age: 67
End: 2022-03-08
Payer: MEDICARE

## 2022-03-08 VITALS
WEIGHT: 156 LBS | SYSTOLIC BLOOD PRESSURE: 118 MMHG | BODY MASS INDEX: 28.71 KG/M2 | DIASTOLIC BLOOD PRESSURE: 70 MMHG | HEIGHT: 62 IN

## 2022-03-08 DIAGNOSIS — R31.9 HEMATURIA, UNSPECIFIED TYPE: ICD-10-CM

## 2022-03-08 DIAGNOSIS — R73.02 GLUCOSE INTOLERANCE (IMPAIRED GLUCOSE TOLERANCE): ICD-10-CM

## 2022-03-08 DIAGNOSIS — M85.89 OSTEOPENIA OF MULTIPLE SITES: ICD-10-CM

## 2022-03-08 DIAGNOSIS — K80.20 GALLSTONES: ICD-10-CM

## 2022-03-08 DIAGNOSIS — E53.8 LOW SERUM VITAMIN B12: ICD-10-CM

## 2022-03-08 DIAGNOSIS — E03.9 ACQUIRED HYPOTHYROIDISM: ICD-10-CM

## 2022-03-08 DIAGNOSIS — B35.1 ONYCHOMYCOSIS: ICD-10-CM

## 2022-03-08 DIAGNOSIS — E78.49 OTHER HYPERLIPIDEMIA: ICD-10-CM

## 2022-03-08 DIAGNOSIS — D12.6 COLON ADENOMA: ICD-10-CM

## 2022-03-08 DIAGNOSIS — K63.5 POLYP OF COLON, UNSPECIFIED PART OF COLON, UNSPECIFIED TYPE: ICD-10-CM

## 2022-03-08 DIAGNOSIS — K21.9 GASTROESOPHAGEAL REFLUX DISEASE, UNSPECIFIED WHETHER ESOPHAGITIS PRESENT: Primary | ICD-10-CM

## 2022-03-08 DIAGNOSIS — N95.2 VAGINAL ATROPHY: ICD-10-CM

## 2022-03-08 PROCEDURE — 99999 PR PBB SHADOW E&M-EST. PATIENT-LVL V: CPT | Mod: PBBFAC,,, | Performed by: INTERNAL MEDICINE

## 2022-03-08 PROCEDURE — 99214 OFFICE O/P EST MOD 30 MIN: CPT | Mod: S$PBB,,, | Performed by: INTERNAL MEDICINE

## 2022-03-08 PROCEDURE — 99215 OFFICE O/P EST HI 40 MIN: CPT | Mod: PBBFAC | Performed by: INTERNAL MEDICINE

## 2022-03-08 PROCEDURE — 99999 PR PBB SHADOW E&M-EST. PATIENT-LVL V: ICD-10-PCS | Mod: PBBFAC,,, | Performed by: INTERNAL MEDICINE

## 2022-03-08 PROCEDURE — 81003 URINALYSIS AUTO W/O SCOPE: CPT | Performed by: INTERNAL MEDICINE

## 2022-03-08 PROCEDURE — 99214 PR OFFICE/OUTPT VISIT, EST, LEVL IV, 30-39 MIN: ICD-10-PCS | Mod: S$PBB,,, | Performed by: INTERNAL MEDICINE

## 2022-03-08 RX ORDER — LEVOCETIRIZINE DIHYDROCHLORIDE 5 MG/1
TABLET, FILM COATED ORAL
COMMUNITY
Start: 2021-11-24 | End: 2023-07-05

## 2022-03-08 RX ORDER — FLUTICASONE PROPIONATE 50 MCG
2 SPRAY, SUSPENSION (ML) NASAL DAILY
Qty: 16 G | Refills: 3 | Status: SHIPPED | OUTPATIENT
Start: 2022-03-08 | End: 2022-08-15

## 2022-03-08 RX ORDER — FLUTICASONE PROPIONATE 50 MCG
2 SPRAY, SUSPENSION (ML) NASAL DAILY
COMMUNITY
Start: 2022-02-23 | End: 2022-03-08 | Stop reason: SDUPTHER

## 2022-03-08 RX ORDER — INFLUENZA A VIRUS A/VICTORIA/2570/2019 IVR-215 (H1N1) ANTIGEN (FORMALDEHYDE INACTIVATED), INFLUENZA A VIRUS A/TASMANIA/503/2020 IVR-221 (H3N2) ANTIGEN (FORMALDEHYDE INACTIVATED), INFLUENZA B VIRUS B/PHUKET/3073/2013 ANTIGEN (FORMALDEHYDE INACTIVATED), AND INFLUENZA B VIRUS B/WASHINGTON/02/2019 ANTIGEN (FORMALDEHYDE INACTIVATED) 60; 60; 60; 60 UG/.7ML; UG/.7ML; UG/.7ML; UG/.7ML
INJECTION, SUSPENSION INTRAMUSCULAR
COMMUNITY
Start: 2021-10-16 | End: 2022-03-08

## 2022-03-08 RX ORDER — PANTOPRAZOLE SODIUM 40 MG/1
40 TABLET, DELAYED RELEASE ORAL DAILY
COMMUNITY
Start: 2021-10-19 | End: 2022-12-19

## 2022-03-08 RX ORDER — ESTRADIOL 0.1 MG/G
1 CREAM VAGINAL
Qty: 42.5 G | Refills: 3 | Status: SHIPPED | OUTPATIENT
Start: 2022-03-10 | End: 2022-08-15

## 2022-03-08 NOTE — PROGRESS NOTES
"Subjective:       Patient ID: Isha Leonard is a 66 y.o. female.    Chief Complaint: Follow-up    Follow up multiple issues, "annual."     with her.    Had a URI last year, seen in [place] a few times.  Currently better, not SOB.      Did get COVID booster.    Had some hematuria recently and was told she had a UTI.  This was also in [place].    Due for labs and abdominal ultrasound.  Also, would like to see Podiatry due to onychomycosis.    Patient Active Problem List:     Other hyperlipidemia     Acquired hypothyroidism     AR (allergic rhinitis)     Glucose intolerance (impaired glucose tolerance)     Intramural leiomyoma of uterus     Gallstones: see ultrasound 2014; CT 2017, also 2019     Colon adenoma: 2011- also 3/17: 5 year follow up recommended     Fatty liver: see u/s 2014; stable CT 2017: fibroscan 2019 F0/4 fibrosis and S3 (>2/3 steatosis).     Midline cystocele     Rectus diastasis     Urinary, incontinence, stress female     Diverticulosis of large intestine without hemorrhage: see CT scan May 2017     Bilateral renal cysts: see CT scan May 2017; not seen on u/s 2019     Osteopenia of multiple sites: see DEXA 2/18     Mild vitamin D deficiency     Helicobacter pylori ab+ in 2010, treated; negative stool 2/20     Gastroesophageal reflux disease      Review of Systems   Constitutional: Negative for activity change, appetite change, chills, fatigue and fever.   HENT: Negative for congestion, hearing loss, sinus pressure and sore throat.    Eyes: Negative for visual disturbance.   Respiratory: Negative for apnea, cough, shortness of breath and wheezing.         Had URI/cough last year   Cardiovascular: Negative for chest pain, palpitations and leg swelling.   Gastrointestinal: Negative for abdominal distention, abdominal pain, constipation, diarrhea, nausea and vomiting.   Genitourinary: Negative for dysuria, frequency, hematuria and vaginal bleeding.   Musculoskeletal: Negative for gait problem, joint " swelling and myalgias.   Skin: Negative for rash.        Onychomycosis   Neurological: Negative for dizziness, weakness, light-headedness and headaches.   Hematological: Negative for adenopathy. Does not bruise/bleed easily.   Psychiatric/Behavioral: Negative for confusion, hallucinations, sleep disturbance and suicidal ideas.       Objective:      Physical Exam  Vitals and nursing note reviewed.   Constitutional:       Appearance: She is well-developed.   HENT:      Head: Normocephalic and atraumatic.      Right Ear: External ear normal.      Left Ear: External ear normal.      Nose: Nose normal.      Mouth/Throat:      Pharynx: No oropharyngeal exudate.   Eyes:      General: No scleral icterus.     Extraocular Movements: Extraocular movements intact.      Conjunctiva/sclera: Conjunctivae normal.   Neck:      Thyroid: No thyromegaly.      Vascular: No JVD.      Comments: Thyroidectomy  Cardiovascular:      Rate and Rhythm: Normal rate and regular rhythm.      Heart sounds: Normal heart sounds. No murmur heard.    No gallop.   Pulmonary:      Effort: Pulmonary effort is normal. No respiratory distress.      Breath sounds: Normal breath sounds. No wheezing.   Abdominal:      General: Bowel sounds are normal. There is no distension.      Palpations: Abdomen is soft. There is no mass.      Tenderness: There is no abdominal tenderness. There is no guarding or rebound.   Musculoskeletal:         General: No tenderness. Normal range of motion.      Cervical back: Normal range of motion and neck supple.   Lymphadenopathy:      Cervical: No cervical adenopathy.   Skin:     General: Skin is warm.      Findings: No erythema or rash.      Comments: Onychomycosis both great toes   Neurological:      General: No focal deficit present.      Mental Status: She is alert and oriented to person, place, and time.      Cranial Nerves: No cranial nerve deficit.      Coordination: Coordination normal.   Psychiatric:         Behavior:  Behavior normal.         Thought Content: Thought content normal.         Judgment: Judgment normal.         Assessment:       1. Gastroesophageal reflux disease, unspecified whether esophagitis present    2. Vaginal atrophy    3. Other hyperlipidemia    4. Acquired hypothyroidism    5. Glucose intolerance (impaired glucose tolerance)    6. Colon adenoma: 2011- also 3/17: 5 year follow up recommended    7. Gallstones: see ultrasound 2014; CT 2017, also 2019    8. Osteopenia of multiple sites: see DEXA 2/18    9. Polyp of colon, unspecified part of colon, unspecified type     10. Low serum vitamin B12    11. Hematuria, unspecified type    12. Onychomycosis        Plan:           Isha was seen today for follow-up.    Diagnoses and all orders for this visit:    Gastroesophageal reflux disease, unspecified whether esophagitis present  -     Case Request Endoscopy: ESOPHAGOGASTRODUODENOSCOPY (EGD)    Vaginal atrophy  -     estradioL (ESTRACE) 0.01 % (0.1 mg/gram) vaginal cream; Place 1 g vaginally twice a week.    Other hyperlipidemia  -     CBC Auto Differential; Future  -     Comprehensive Metabolic Panel; Future  -     Lipid Panel; Future  -     CT Cardiac Scoring; Future    Acquired hypothyroidism  -     TSH; Future    Glucose intolerance (impaired glucose tolerance)  -     Hemoglobin A1C; Future    Colon adenoma: 2011- also 3/17: 5 year follow up recommended  -     Case Request Endoscopy: COLONOSCOPY    Gallstones: see ultrasound 2014; CT 2017, also 2019  -     US Abdomen Complete; Future    Osteopenia of multiple sites: see DEXA 2/18  -     DXA Bone Density Spine And Hip; Future    Polyp of colon, unspecified part of colon, unspecified type   -     Case Request Endoscopy: COLONOSCOPY    Low serum vitamin B12  -     Vitamin B12; Future    Hematuria, unspecified type  -     Urinalysis, Reflex to Urine Culture Urine, Clean Catch    Onychomycosis  -     Ambulatory referral/consult to Podiatry; Future    Other orders  -      fluticasone propionate (FLONASE) 50 mcg/actuation nasal spray; 2 sprays (100 mcg total) by Each Nostril route once daily.    The 10-year ASCVD risk score (Marcella RILEY Jr., et al., 2013) is: 6.3%    Values used to calculate the score:      Age: 66 years      Sex: Female      Is Non- : No      Diabetic: No      Tobacco smoker: No      Systolic Blood Pressure: 118 mmHg      Is BP treated: No      HDL Cholesterol: 50 mg/dL      Total Cholesterol: 215 mg/dL     I will review all studies and determine further tx depending on findings  Schedule mammogram and bone density exam

## 2022-03-09 LAB
BILIRUB UR QL STRIP: NEGATIVE
CLARITY UR REFRACT.AUTO: ABNORMAL
COLOR UR AUTO: YELLOW
GLUCOSE UR QL STRIP: NEGATIVE
HGB UR QL STRIP: NEGATIVE
KETONES UR QL STRIP: NEGATIVE
LEUKOCYTE ESTERASE UR QL STRIP: NEGATIVE
NITRITE UR QL STRIP: NEGATIVE
PH UR STRIP: 6 [PH] (ref 5–8)
PROT UR QL STRIP: NEGATIVE
SP GR UR STRIP: 1.02 (ref 1–1.03)
URN SPEC COLLECT METH UR: ABNORMAL

## 2022-03-15 ENCOUNTER — LAB VISIT (OUTPATIENT)
Dept: LAB | Facility: HOSPITAL | Age: 67
End: 2022-03-15
Attending: INTERNAL MEDICINE
Payer: MEDICARE

## 2022-03-15 DIAGNOSIS — E03.9 ACQUIRED HYPOTHYROIDISM: ICD-10-CM

## 2022-03-15 DIAGNOSIS — E53.8 LOW SERUM VITAMIN B12: ICD-10-CM

## 2022-03-15 DIAGNOSIS — E78.49 OTHER HYPERLIPIDEMIA: ICD-10-CM

## 2022-03-15 DIAGNOSIS — R73.02 GLUCOSE INTOLERANCE (IMPAIRED GLUCOSE TOLERANCE): ICD-10-CM

## 2022-03-15 LAB
ALBUMIN SERPL BCP-MCNC: 3.7 G/DL (ref 3.5–5.2)
ALP SERPL-CCNC: 65 U/L (ref 55–135)
ALT SERPL W/O P-5'-P-CCNC: 31 U/L (ref 10–44)
ANION GAP SERPL CALC-SCNC: 10 MMOL/L (ref 8–16)
ANISOCYTOSIS BLD QL SMEAR: SLIGHT
AST SERPL-CCNC: 24 U/L (ref 10–40)
BASOPHILS # BLD AUTO: 0.05 K/UL (ref 0–0.2)
BASOPHILS NFR BLD: 0.8 % (ref 0–1.9)
BILIRUB SERPL-MCNC: 0.7 MG/DL (ref 0.1–1)
BUN SERPL-MCNC: 18 MG/DL (ref 8–23)
CALCIUM SERPL-MCNC: 9.4 MG/DL (ref 8.7–10.5)
CHLORIDE SERPL-SCNC: 103 MMOL/L (ref 95–110)
CHOLEST SERPL-MCNC: 199 MG/DL (ref 120–199)
CHOLEST/HDLC SERPL: 4.1 {RATIO} (ref 2–5)
CO2 SERPL-SCNC: 27 MMOL/L (ref 23–29)
CREAT SERPL-MCNC: 0.7 MG/DL (ref 0.5–1.4)
DIFFERENTIAL METHOD: ABNORMAL
EOSINOPHIL # BLD AUTO: 0.1 K/UL (ref 0–0.5)
EOSINOPHIL NFR BLD: 2 % (ref 0–8)
ERYTHROCYTE [DISTWIDTH] IN BLOOD BY AUTOMATED COUNT: 12.7 % (ref 11.5–14.5)
EST. GFR  (AFRICAN AMERICAN): >60 ML/MIN/1.73 M^2
EST. GFR  (NON AFRICAN AMERICAN): >60 ML/MIN/1.73 M^2
ESTIMATED AVG GLUCOSE: 128 MG/DL (ref 68–131)
GLUCOSE SERPL-MCNC: 105 MG/DL (ref 70–110)
HBA1C MFR BLD: 6.1 % (ref 4–5.6)
HCT VFR BLD AUTO: 44.1 % (ref 37–48.5)
HDLC SERPL-MCNC: 49 MG/DL (ref 40–75)
HDLC SERPL: 24.6 % (ref 20–50)
HGB BLD-MCNC: 13.7 G/DL (ref 12–16)
IMM GRANULOCYTES # BLD AUTO: 0.02 K/UL (ref 0–0.04)
IMM GRANULOCYTES NFR BLD AUTO: 0.3 % (ref 0–0.5)
LDLC SERPL CALC-MCNC: 109 MG/DL (ref 63–159)
LYMPHOCYTES # BLD AUTO: 3.1 K/UL (ref 1–4.8)
LYMPHOCYTES NFR BLD: 49 % (ref 18–48)
MCH RBC QN AUTO: 28.5 PG (ref 27–31)
MCHC RBC AUTO-ENTMCNC: 31.1 G/DL (ref 32–36)
MCV RBC AUTO: 92 FL (ref 82–98)
MONOCYTES # BLD AUTO: 0.4 K/UL (ref 0.3–1)
MONOCYTES NFR BLD: 6.4 % (ref 4–15)
NEUTROPHILS # BLD AUTO: 2.7 K/UL (ref 1.8–7.7)
NEUTROPHILS NFR BLD: 41.5 % (ref 38–73)
NONHDLC SERPL-MCNC: 150 MG/DL
NRBC BLD-RTO: 0 /100 WBC
OVALOCYTES BLD QL SMEAR: ABNORMAL
PLATELET # BLD AUTO: 346 K/UL (ref 150–450)
PLATELET BLD QL SMEAR: ABNORMAL
PMV BLD AUTO: 10.1 FL (ref 9.2–12.9)
POIKILOCYTOSIS BLD QL SMEAR: SLIGHT
POLYCHROMASIA BLD QL SMEAR: ABNORMAL
POTASSIUM SERPL-SCNC: 4.3 MMOL/L (ref 3.5–5.1)
PROT SERPL-MCNC: 7 G/DL (ref 6–8.4)
RBC # BLD AUTO: 4.8 M/UL (ref 4–5.4)
SMUDGE CELLS BLD QL SMEAR: PRESENT
SODIUM SERPL-SCNC: 140 MMOL/L (ref 136–145)
TRIGL SERPL-MCNC: 205 MG/DL (ref 30–150)
TSH SERPL DL<=0.005 MIU/L-ACNC: 2.73 UIU/ML (ref 0.4–4)
VIT B12 SERPL-MCNC: 509 PG/ML (ref 210–950)
WBC # BLD AUTO: 6.41 K/UL (ref 3.9–12.7)

## 2022-03-15 PROCEDURE — 85025 COMPLETE CBC W/AUTO DIFF WBC: CPT | Performed by: INTERNAL MEDICINE

## 2022-03-15 PROCEDURE — 83036 HEMOGLOBIN GLYCOSYLATED A1C: CPT | Performed by: INTERNAL MEDICINE

## 2022-03-15 PROCEDURE — 80053 COMPREHEN METABOLIC PANEL: CPT | Performed by: INTERNAL MEDICINE

## 2022-03-15 PROCEDURE — 82607 VITAMIN B-12: CPT | Performed by: INTERNAL MEDICINE

## 2022-03-15 PROCEDURE — 80061 LIPID PANEL: CPT | Performed by: INTERNAL MEDICINE

## 2022-03-15 PROCEDURE — 36415 COLL VENOUS BLD VENIPUNCTURE: CPT | Mod: PO | Performed by: INTERNAL MEDICINE

## 2022-03-15 PROCEDURE — 84443 ASSAY THYROID STIM HORMONE: CPT | Performed by: INTERNAL MEDICINE

## 2022-03-16 ENCOUNTER — OFFICE VISIT (OUTPATIENT)
Dept: OBSTETRICS AND GYNECOLOGY | Facility: CLINIC | Age: 67
End: 2022-03-16
Payer: MEDICARE

## 2022-03-16 VITALS
HEIGHT: 62 IN | DIASTOLIC BLOOD PRESSURE: 70 MMHG | WEIGHT: 158.75 LBS | SYSTOLIC BLOOD PRESSURE: 120 MMHG | BODY MASS INDEX: 29.21 KG/M2

## 2022-03-16 DIAGNOSIS — N81.2 CYSTOCELE AND RECTOCELE WITH INCOMPLETE UTEROVAGINAL PROLAPSE: ICD-10-CM

## 2022-03-16 DIAGNOSIS — D25.9 UTERINE LEIOMYOMA, UNSPECIFIED LOCATION: ICD-10-CM

## 2022-03-16 DIAGNOSIS — Z01.419 VISIT FOR GYNECOLOGIC EXAMINATION: Primary | ICD-10-CM

## 2022-03-16 DIAGNOSIS — N95.9 MENOPAUSAL AND PERIMENOPAUSAL DISORDER: ICD-10-CM

## 2022-03-16 PROCEDURE — 99999 PR PBB SHADOW E&M-EST. PATIENT-LVL III: ICD-10-PCS | Mod: PBBFAC,,, | Performed by: OBSTETRICS & GYNECOLOGY

## 2022-03-16 PROCEDURE — G0101 CA SCREEN;PELVIC/BREAST EXAM: HCPCS | Mod: PBBFAC | Performed by: OBSTETRICS & GYNECOLOGY

## 2022-03-16 PROCEDURE — G0101 CA SCREEN;PELVIC/BREAST EXAM: HCPCS | Mod: S$PBB,,, | Performed by: OBSTETRICS & GYNECOLOGY

## 2022-03-16 PROCEDURE — 99999 PR PBB SHADOW E&M-EST. PATIENT-LVL III: CPT | Mod: PBBFAC,,, | Performed by: OBSTETRICS & GYNECOLOGY

## 2022-03-16 PROCEDURE — 99213 OFFICE O/P EST LOW 20 MIN: CPT | Mod: PBBFAC,25 | Performed by: OBSTETRICS & GYNECOLOGY

## 2022-03-16 PROCEDURE — G0101 PR CA SCREEN;PELVIC/BREAST EXAM: ICD-10-PCS | Mod: S$PBB,,, | Performed by: OBSTETRICS & GYNECOLOGY

## 2022-03-16 NOTE — PROGRESS NOTES
HISTORY OF PRESENT ILLNESS:    Isha Leonard is a 66 y.o. female , presents for a routine exam and has no complaints.  PROLAPSE STABLE - PRIMARILY MODERATE CYSTOCELE AND SOME UTERINE DESCENSUS, NOT USING RING PESSARY DUE TO DIFFICULTIES INSERTING AND REMOVING; ADVISED THAT WE CAN HAVE HER COME EVERY 3 MONTHS FOR PESSARY CLEANING AND REINSERTION IF SHE WOULD LIKE.  STABLE FIBROID UTERUS.  PAP NOT INDICATED AND MAMMO HAS BEEN ORDERED BY PCP    :   problem visit, complaining of VULV BUMPS OCCAS AND WANTS ANTIBIOTIC - ADVISED TINY FOLLICULITIS AND ADVISED HOT SOAKS TO PROMOTE SPON DRAINAGE, NO ABX CREAM.  HAS HAD DIFFICULTY INSERTING AND REMOVE RING PESSARY, SO PATIENT TEACHING FOR BEST TECHNIQUES.  IN CASE SHE HAS DIFFICULTY REMOVING, SHE CAN RETURN EVERY 12 WEEKS FOR PESSARY CLEANING.  REASSURED NO INCREASED SIZE OF FIBROID AND NO PALP ADNEXAL MASS, AND FOLLOW-UP FIBROID WITH ULTRASOUND IS NOT NEEDED, ALSO REASSURED PAP NOT INDICATED AND CERVIX APPEARS NORMAL.  CYSTOCELE IS UNCHANGED.  GRANDCHILD DUE FOR DAUGHTER IN [place] LATER THIS MONTH AND PATIENT WILL GO UP TO HELP HER  2020:   PAP NOT INDICATED AND MAMMO UP TO DATE.  CYSTOCELE MORE SYMPTOMATIC AND DISCUSSED PESSARY - PRINTED PT INFO FROM [facility], AND IF PT DESIRES F/U TO UROGYN NP FOR FITTING.  UT STABLE SIZE  LAST 2019:   female , presents, WANTS CHECK OF FIBROID AND BLADDER.  DENIES PROBLEMS, ASKING ABOUT PESSARY AND ADVISED AGAINST UNLESS SYMPTOMATIC.  SINCE STABLE COULD CHECK USG FOR FIBROID, BUT WOULD NOT RECOMMEND INTERVENTION AT THIS POINT  LAST VISIT 2018:  DECIDED AGAINST PROLAPSE SURGERY LAST YEAR.  RECENT NL MAMMO AND DUE COTEST.  C/O ITCHING VULVA - AFFIRM, DEFERS VAGINAL MEDICATION SO LOTRISONE AND IF SX PERSIST ADVISED MONISTAT 3 SUPPOS.  NO CHANGE UT SIZE  Last visit 2018:    PAP DUE NEXT YEAR AND MAMMO.   SAW KNOEPP AND CONSIDERING SURGERY FOR UT FIBROIDS, PROLAPSE AND PLANNING ABDOMINOPLASTY AT THE SAME TIME.  RARE HUMZA  AND MILD-MOD VAG PROLAPSE.  HAS APPT SET WITH [facility].  COUNSELED PT AND SON THAT DR LR CAN OPEN ABDOMEN, UROGYN CAN PERFORM AS MARIO BURT WITH RONQUILLO, SUSPENSION PROCEDURE, AND THEN PLASTIC SURGERY CAN COMPLETE THE ABDOMINOPLASTY.  DISCUSSED ELECTIVE NATURE OF BOTH PROCEDURES BUT WORTHWHILE IF SX MERIT.  ON EXAM MILD CYSTOCELE AND MILD RECTOCELE, UTERUS 12 WEEKS SIZE  LAST VISIT 2016:  PAP DUE AND MAMMO IS UP TO DATE, LAST 2016.  NO SIGNIF HOT FLUSHES, AND RARE HUMZA.  HAS A HISTORY OF FIBROIDS AND WONDERS IF THEY ARE MAKING HER ABDOMEN DISTENDED.  ON EXAM ? 14 WEEKS SIZE BUT EXAM LIMITED - WILL REF FOR PELVIC USG TO BE SURE NO OVARIAN COMPONENT  LAST VISIT  JOSELINE:  Isha Leonard is a 58 y.o. female  presents for well woman exam. LMP: No LMP recorded. Patient is postmenopausal.. No issues, problems, or complaints.     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       Past Surgical History:   Procedure Laterality Date    BREAST BIOPSY      Left, benign    COLONOSCOPY N/A 3/3/2017    Procedure: COLONOSCOPY;  Surgeon: Mauri Richardson MD;  Location: [address]);  Service: Endoscopy;  Laterality: N/A;  pt req Dr Richardson    COLONOSCOPY W/ POLYPECTOMY      THYROIDECTOMY, PARTIAL      secondary to thyroid nodule        MEDICATIONS AND ALLERGIES:      Current Outpatient Medications:     cholecalciferol,  vitamin D3, (VITAMIN D3) 25 mcg (1,000 unit) capsule, Take 1 capsule (1,000 Units total) by mouth once daily., Disp: 30 capsule, Rfl: 12    estradioL (ESTRACE) 0.01 % (0.1 mg/gram) vaginal cream, Place 1 g vaginally twice a week., Disp: 42.5 g, Rfl: 3    fluticasone propionate (FLONASE) 50 mcg/actuation nasal spray, 2 sprays (100 mcg total) by Each Nostril route once daily., Disp: 16 g, Rfl: 3    levocetirizine (XYZAL) 5 MG tablet, , Disp: , Rfl:     levothyroxine (SYNTHROID) 125 MCG tablet, Take 1 tablet (125 mcg total) by mouth once daily., Disp: 90 tablet, Rfl: 1    pantoprazole (PROTONIX) 40 MG tablet, Take 40 mg by mouth once daily., Disp: , Rfl:     Review of patient's allergies indicates:  No Known Allergies    Family History   Problem Relation Age of Onset    Osteoporosis Mother     Diabetes Mother     Hypertension Mother     Heart disease Mother     Cancer Father         Bladder cancer    Diabetes Sister     Cancer Brother         Lung    No Known Problems Daughter     No Known Problems Daughter     No Known Problems Son     Breast cancer Neg Hx     Colon cancer Neg Hx     Eclampsia Neg Hx     Miscarriages / Stillbirths Neg Hx     Ovarian cancer Neg Hx      labor Neg Hx     Stroke Neg Hx     Cervical cancer Neg Hx     Endometrial cancer Neg Hx     Vaginal cancer Neg Hx        Social History     Socioeconomic History    Marital status:     Number of children: 3   Occupational History    Occupation: Teacher   Tobacco Use    Smoking status: Never Smoker    Smokeless tobacco: Never Used   Substance and Sexual Activity    Alcohol use: No    Drug use: No    Sexual activity: Yes     Partners: Male     Birth control/protection: Post-menopausal   Other Topics Concern    Are you pregnant or think you may be? No    Breast-feeding No       COMPREHENSIVE GYN HISTORY:  PAP History:  Denies abnormal Paps except a noted above.  Infection History: Denies STDs. Denies  "PID.  Benign History: Denies uterine fibroids. Denies ovarian cysts. Denies endometriosis.  Denies other conditions.  Cancer History: Denies cervical cancer. Denies uterine cancer or hyperplasia. Denies ovarian cancer. Denies vulvar cancer or pre-cancer. Denies vaginal cancer or pre-cancer. Denies breast cancer. Denies colon cancer.    ROS:  GENERAL: No weight changes. No swelling. No fatigue. No fever.  CARDIOVASCULAR: No chest pain. No shortness of breath. No leg cramps.   NEUROLOGICAL: No headaches. No vision changes.  BREASTS: No pain. No lumps. No discharge.  ABDOMEN: No pain. No nausea. No vomiting. No diarrhea. No constipation.  REPRODUCTIVE: No abnormal bleeding.   VULVA: No pain. No lesions. No itching.  VAGINA: No relaxation. No itching. No odor. No discharge. No lesions.  URINARY: No incontinence. No nocturia. No frequency. No dysuria.    /70 (BP Location: Left arm, Patient Position: Sitting, BP Method: Medium (Manual))   Ht 5' 2" (1.575 m)   Wt 72 kg (158 lb 11.7 oz)   BMI 29.03 kg/m²     PE:  APPEARANCE: Well nourished, well developed, in no acute distress.  AFFECT: WNL, alert and oriented x 3.  SKIN: No hirsutism or acne.  NECK: Neck symmetric without masses or thyromegaly.  NODES: No inguinal, cervical, axillary or femoral lymph node enlargement.  CHEST: Good respiratory effort.   ABDOMEN: Soft. No tenderness or masses. No hepatosplenomegaly. No hernias.  BREASTS: Symmetrical, no skin changes or visible lesions. No palpable masses, nipple discharge bilaterally.  PELVIC: External female genitalia without lesions.  Female hair distribution. Adequate perineal body, Normal urethral meatus. Vagina moist and well rugated without lesions or discharge.  SECOND DEGREE cystocele MIN rectocele present, MILD DECENSUS. Cervix pink without lesions, discharge or tenderness. Uterus is 8 WEEK size, regular, mobile and nontender. Adnexa without masses or tenderness.  EXTREMITIES: No " edema.    PROCEDURES:      DIAGNOSIS:  1. Visit for gynecologic examination     2. Menopausal and perimenopausal disorder     3. Cystocele and rectocele with incomplete uterovaginal prolapse     4. Uterine leiomyoma, unspecified location         PLAN:    LABS AND TESTS ORDERED:  Mammogram    COUNSELING:  The patient was counseled today on osteoporosis prevention, calcium supplementation, and regular weight bearing exercise. The patient was also counseled today on ACS PAP guidelines, with recommendations for yearly pelvic exams unless their uterus, cervix, and ovaries were removed for benign reasons; in that case, examinations every 3-5 years are recommended.  The patient was also counseled regarding monthly breast self-examination, routine STD screening for at-risk populations, prophylactic immunizations for transmitted infections such as  HPV, Pertussis, or Influenza as appropriate, and yearly mammograms when indicated by ACS guidelines.  She was advised to see her primary care physician for all other health maintenance.    FOLLOW-UP with me annually.

## 2022-03-17 DIAGNOSIS — R73.02 GLUCOSE INTOLERANCE (IMPAIRED GLUCOSE TOLERANCE): Primary | ICD-10-CM

## 2022-03-23 ENCOUNTER — TELEPHONE (OUTPATIENT)
Dept: INTERNAL MEDICINE | Facility: CLINIC | Age: 67
End: 2022-03-23
Payer: MEDICARE

## 2022-03-23 NOTE — TELEPHONE ENCOUNTER
Has not read her my [facility] results; she has a CT scan next week and I will review all after that time

## 2022-03-31 ENCOUNTER — HOSPITAL ENCOUNTER (OUTPATIENT)
Dept: RADIOLOGY | Facility: CLINIC | Age: 67
Discharge: HOME | End: 2022-03-31
Attending: INTERNAL MEDICINE
Payer: MEDICARE

## 2022-03-31 ENCOUNTER — HOSPITAL ENCOUNTER (OUTPATIENT)
Dept: RADIOLOGY | Facility: HOSPITAL | Age: 67
Discharge: HOME | End: 2022-03-31
Attending: INTERNAL MEDICINE
Payer: MEDICARE

## 2022-03-31 DIAGNOSIS — K80.20 GALLSTONES: ICD-10-CM

## 2022-03-31 DIAGNOSIS — M85.89 OSTEOPENIA OF MULTIPLE SITES: ICD-10-CM

## 2022-03-31 DIAGNOSIS — E78.49 OTHER HYPERLIPIDEMIA: ICD-10-CM

## 2022-03-31 PROCEDURE — 76700 US ABDOMEN COMPLETE: ICD-10-PCS | Mod: 26,,, | Performed by: RADIOLOGY

## 2022-03-31 PROCEDURE — 76700 US EXAM ABDOM COMPLETE: CPT | Mod: 26,,, | Performed by: RADIOLOGY

## 2022-03-31 PROCEDURE — 77080 DXA BONE DENSITY AXIAL: CPT | Mod: 26,,, | Performed by: INTERNAL MEDICINE

## 2022-03-31 PROCEDURE — 75571 CT CALCIUM SCORING CARDIAC: ICD-10-PCS | Mod: 26,,, | Performed by: RADIOLOGY

## 2022-03-31 PROCEDURE — 77080 DEXA BONE DENSITY SPINE HIP: ICD-10-PCS | Mod: 26,,, | Performed by: INTERNAL MEDICINE

## 2022-03-31 PROCEDURE — 77080 DXA BONE DENSITY AXIAL: CPT | Mod: TC

## 2022-03-31 PROCEDURE — 76700 US EXAM ABDOM COMPLETE: CPT | Mod: TC

## 2022-03-31 PROCEDURE — 75571 CT HRT W/O DYE W/CA TEST: CPT | Mod: 26,,, | Performed by: RADIOLOGY

## 2022-03-31 PROCEDURE — 75571 CT HRT W/O DYE W/CA TEST: CPT | Mod: TC

## 2022-04-06 ENCOUNTER — OFFICE VISIT (OUTPATIENT)
Dept: OBSTETRICS AND GYNECOLOGY | Facility: CLINIC | Age: 67
End: 2022-04-06
Payer: MEDICARE

## 2022-04-06 VITALS
HEIGHT: 62 IN | DIASTOLIC BLOOD PRESSURE: 68 MMHG | WEIGHT: 157.63 LBS | SYSTOLIC BLOOD PRESSURE: 114 MMHG | BODY MASS INDEX: 29.01 KG/M2

## 2022-04-06 DIAGNOSIS — R92.1 BREAST CALCIFICATIONS: ICD-10-CM

## 2022-04-06 DIAGNOSIS — N95.2 VAGINAL ATROPHY: ICD-10-CM

## 2022-04-06 DIAGNOSIS — N81.11 MIDLINE CYSTOCELE: ICD-10-CM

## 2022-04-06 DIAGNOSIS — N95.9 MENOPAUSAL AND PERIMENOPAUSAL DISORDER: Primary | ICD-10-CM

## 2022-04-06 PROCEDURE — 99212 OFFICE O/P EST SF 10 MIN: CPT | Mod: S$PBB,,, | Performed by: OBSTETRICS & GYNECOLOGY

## 2022-04-06 PROCEDURE — 99212 PR OFFICE/OUTPT VISIT, EST, LEVL II, 10-19 MIN: ICD-10-PCS | Mod: S$PBB,,, | Performed by: OBSTETRICS & GYNECOLOGY

## 2022-04-06 PROCEDURE — 99999 PR PBB SHADOW E&M-EST. PATIENT-LVL II: CPT | Mod: PBBFAC,,, | Performed by: OBSTETRICS & GYNECOLOGY

## 2022-04-06 PROCEDURE — 99999 PR PBB SHADOW E&M-EST. PATIENT-LVL II: ICD-10-PCS | Mod: PBBFAC,,, | Performed by: OBSTETRICS & GYNECOLOGY

## 2022-04-06 PROCEDURE — 99212 OFFICE O/P EST SF 10 MIN: CPT | Mod: PBBFAC | Performed by: OBSTETRICS & GYNECOLOGY

## 2022-04-06 NOTE — PROGRESS NOTES
HISTORY OF PRESENT ILLNESS:    Isha Leonard is a 66 y.o. female , presents to discuss RESULTS OF HER CT ORDERED BY HER PCP SHOWING CALCIFICATIONS IN THE AREA OF THE RIGHT BREAST.  DISCUSSED CAUSES OF BREAST CALCIFICATIONS.  MAMMOGRAM ORDER PLACED - SCHEDULED FOR .  ALSO DISCUSSED COMFORT MEASURES FOR VAGINAL ATROPHY.    LAST 3/2022:   PROLAPSE STABLE - PRIMARILY MODERATE CYSTOCELE AND SOME UTERINE DESCENSUS, NOT USING RING PESSARY DUE TO DIFFICULTIES INSERTING AND REMOVING; ADVISED THAT WE CAN HAVE HER COME EVERY 3 MONTHS FOR PESSARY CLEANING AND REINSERTION IF SHE WOULD LIKE.  STABLE FIBROID UTERUS.  PAP NOT INDICATED AND MAMMO HAS BEEN ORDERED BY PCP  :   problem visit, complaining of VULV BUMPS OCCAS AND WANTS ANTIBIOTIC - ADVISED TINY FOLLICULITIS AND ADVISED HOT SOAKS TO PROMOTE SPON DRAINAGE, NO ABX CREAM.  HAS HAD DIFFICULTY INSERTING AND REMOVE RING PESSARY, SO PATIENT TEACHING FOR BEST TECHNIQUES.  IN CASE SHE HAS DIFFICULTY REMOVING, SHE CAN RETURN EVERY 12 WEEKS FOR PESSARY CLEANING.  REASSURED NO INCREASED SIZE OF FIBROID AND NO PALP ADNEXAL MASS, AND FOLLOW-UP FIBROID WITH ULTRASOUND IS NOT NEEDED, ALSO REASSURED PAP NOT INDICATED AND CERVIX APPEARS NORMAL.  CYSTOCELE IS UNCHANGED.  GRANDCHILD DUE FOR DAUGHTER IN [place] LATER THIS MONTH AND PATIENT WILL GO UP TO HELP HER  2020:   PAP NOT INDICATED AND MAMMO UP TO DATE.  CYSTOCELE MORE SYMPTOMATIC AND DISCUSSED PESSARY - PRINTED PT INFO FROM [facility], AND IF PT DESIRES F/U TO UROGYN NP FOR FITTING.  UT STABLE SIZE  LAST 2019:   female , presents, WANTS CHECK OF FIBROID AND BLADDER.  DENIES PROBLEMS, ASKING ABOUT PESSARY AND ADVISED AGAINST UNLESS SYMPTOMATIC.  SINCE STABLE COULD CHECK USG FOR FIBROID, BUT WOULD NOT RECOMMEND INTERVENTION AT THIS POINT  LAST VISIT 2018:  DECIDED AGAINST PROLAPSE SURGERY LAST YEAR.  RECENT NL MAMMO AND DUE COTEST.  C/O ITCHING VULVA - AFFIRM, DEFERS VAGINAL MEDICATION SO LOTRISONE AND IF SX PERSIST  ADVISED MONISTAT 3 SUPPOS.  NO CHANGE UT SIZE  Last visit 2018:    PAP DUE NEXT YEAR AND MAMMO.   SAW KNOEPP AND CONSIDERING SURGERY FOR UT FIBROIDS, PROLAPSE AND PLANNING ABDOMINOPLASTY AT THE SAME TIME.  RARE HUMZA AND MILD-MOD VAG PROLAPSE.  HAS APPT SET WITH [facility].  COUNSELED PT AND SON THAT DR LR CAN OPEN ABDOMEN, UROGYN CAN PERFORM AS EMILIA JULIO, POSS WITH RONQUILLO, SUSPENSION PROCEDURE, AND THEN PLASTIC SURGERY CAN COMPLETE THE ABDOMINOPLASTY.  DISCUSSED ELECTIVE NATURE OF BOTH PROCEDURES BUT WORTHWHILE IF SX MERIT.  ON EXAM MILD CYSTOCELE AND MILD RECTOCELE, UTERUS 12 WEEKS SIZE  LAST VISIT 2016:  PAP DUE AND MAMMO IS UP TO DATE, LAST 2016.  NO SIGNIF HOT FLUSHES, AND RARE HUMZA.  HAS A HISTORY OF FIBROIDS AND WONDERS IF THEY ARE MAKING HER ABDOMEN DISTENDED.  ON EXAM ? 14 WEEKS SIZE BUT EXAM LIMITED - WILL REF FOR PELVIC USG TO BE SURE NO OVARIAN COMPONENT  LAST VISIT  JOSELINE:  Isha Leonard is a 58 y.o. female  presents for well woman exam. LMP: No LMP recorded. Patient is postmenopausal.. No issues, problems, or complaints. .      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       Past Surgical History:   Procedure Laterality Date    BREAST BIOPSY      Left, benign    COLONOSCOPY N/A 3/3/2017    Procedure: COLONOSCOPY;  Surgeon: Mauri Richardson MD;  Location: [facility] ([address] [place] [address]);  Service: Endoscopy;   Laterality: N/A;  pt req Dr Richardson    COLONOSCOPY W/ POLYPECTOMY      THYROIDECTOMY, PARTIAL      secondary to thyroid nodule        MEDICATIONS AND ALLERGIES:      Current Outpatient Medications:     cholecalciferol, vitamin D3, (VITAMIN D3) 25 mcg (1,000 unit) capsule, Take 1 capsule (1,000 Units total) by mouth once daily., Disp: 30 capsule, Rfl: 12    estradioL (ESTRACE) 0.01 % (0.1 mg/gram) vaginal cream, Place 1 g vaginally twice a week., Disp: 42.5 g, Rfl: 3    fluticasone propionate (FLONASE) 50 mcg/actuation nasal spray, 2 sprays (100 mcg total) by Each Nostril route once daily., Disp: 16 g, Rfl: 3    levocetirizine (XYZAL) 5 MG tablet, , Disp: , Rfl:     levothyroxine (SYNTHROID) 125 MCG tablet, Take 1 tablet (125 mcg total) by mouth once daily., Disp: 90 tablet, Rfl: 1    pantoprazole (PROTONIX) 40 MG tablet, Take 40 mg by mouth once daily., Disp: , Rfl:     clotrimazole-betamethasone 1-0.05% (LOTRISONE) cream, Apply topically 2 (two) times daily., Disp: 45 g, Rfl: 2    terbinafine HCL (LAMISIL) 250 mg tablet, Take 1 tablet (250 mg total) by mouth once daily., Disp: 90 tablet, Rfl: 0    Review of patient's allergies indicates:  No Known Allergies    Family History   Problem Relation Age of Onset    Osteoporosis Mother     Diabetes Mother     Hypertension Mother     Heart disease Mother     Cancer Father         Bladder cancer    Diabetes Sister     Cancer Brother         Lung    No Known Problems Daughter     No Known Problems Daughter     No Known Problems Son     Breast cancer Neg Hx     Colon cancer Neg Hx     Eclampsia Neg Hx     Miscarriages / Stillbirths Neg Hx     Ovarian cancer Neg Hx      labor Neg Hx     Stroke Neg Hx     Cervical cancer Neg Hx     Endometrial cancer Neg Hx     Vaginal cancer Neg Hx        Social History     Socioeconomic History    Marital status:     Number of children: 3   Occupational History    Occupation: Teacher   Tobacco Use  "   Smoking status: Never Smoker    Smokeless tobacco: Never Used   Substance and Sexual Activity    Alcohol use: No    Drug use: No    Sexual activity: Yes     Partners: Male     Birth control/protection: Post-menopausal   Other Topics Concern    Are you pregnant or think you may be? No    Breast-feeding No       COMPREHENSIVE GYN HISTORY:  PAP History:  Denies abnormal Paps except a noted above.  Infection History: Denies STDs. Denies PID.  Benign History: Denies uterine fibroids. Denies ovarian cysts. Denies endometriosis.  Denies other conditions.  Cancer History: Denies cervical cancer. Denies uterine cancer or hyperplasia. Denies ovarian cancer. Denies vulvar cancer or pre-cancer. Denies vaginal cancer or pre-cancer. Denies breast cancer. Denies colon cancer.    ROS:  GENERAL: No weight changes. No swelling. No fatigue. No fever.  CARDIOVASCULAR: No chest pain. No shortness of breath. No leg cramps.   NEUROLOGICAL: No headaches. No vision changes.  BREASTS: No pain. No lumps. No discharge.  ABDOMEN: No pain. No nausea. No vomiting. No diarrhea. No constipation.  REPRODUCTIVE: No abnormal bleeding.   VULVA: No pain. No lesions. No itching.  VAGINA: No relaxation. No itching. No odor. No discharge. No lesions.  URINARY: No incontinence. No nocturia. No frequency. No dysuria.    /68 (BP Location: Right arm, Patient Position: Sitting, BP Method: Medium (Manual))   Ht 5' 2" (1.575 m)   Wt 71.5 kg (157 lb 10.1 oz)   BMI 28.83 kg/m²     PE:  APPEARANCE: Well nourished, well developed, in no acute distress.  AFFECT: WNL, alert and oriented x 3.  SKIN: No hirsutism or acne.  NECK: Neck symmetric without masses or thyromegaly.  NODES: No inguinal, cervical, axillary or femoral lymph node enlargement.  CHEST: Good respiratory effort.   ABDOMEN: Soft. No tenderness or masses. No hepatosplenomegaly. No hernias.  BREASTS: Symmetrical, no skin changes or visible lesions. No palpable masses, nipple discharge " bilaterally.  PELVIC:DEFERRED  EXTREMITIES: No edema.    PROCEDURES:    DIAGNOSIS:  1. Visit for gynecologic examination     2. Menopausal and perimenopausal disorder     3. Midline cystocele     4. Vaginal atrophy         PLAN:    LABS AND TESTS ORDERED:  Mammogram    COUNSELING:  The patient was counseled today on osteoporosis prevention, calcium supplementation, and regular weight bearing exercise. The patient was also counseled today on ACS PAP guidelines, with recommendations for yearly pelvic exams unless their uterus, cervix, and ovaries were removed for benign reasons; in that case, examinations every 3-5 years are recommended.  The patient was also counseled regarding monthly breast self-examination, routine STD screening for at-risk populations, prophylactic immunizations for transmitted infections such as  HPV, Pertussis, or Influenza as appropriate, and yearly mammograms when indicated by ACS guidelines.  She was advised to see her primary care physician for all other health maintenance.    FOLLOW-UP with me annually.

## 2022-04-07 ENCOUNTER — OFFICE VISIT (OUTPATIENT)
Dept: PODIATRY | Facility: CLINIC | Age: 67
End: 2022-04-07
Payer: MEDICARE

## 2022-04-07 VITALS
BODY MASS INDEX: 28.89 KG/M2 | DIASTOLIC BLOOD PRESSURE: 79 MMHG | SYSTOLIC BLOOD PRESSURE: 135 MMHG | WEIGHT: 157.94 LBS | HEART RATE: 80 BPM

## 2022-04-07 DIAGNOSIS — L60.0 INGROWN NAIL: ICD-10-CM

## 2022-04-07 DIAGNOSIS — B35.3 TINEA PEDIS OF BOTH FEET: ICD-10-CM

## 2022-04-07 DIAGNOSIS — L60.9 DISEASE OF NAIL: ICD-10-CM

## 2022-04-07 DIAGNOSIS — B35.1 ONYCHOMYCOSIS DUE TO DERMATOPHYTE: ICD-10-CM

## 2022-04-07 DIAGNOSIS — K76.0 FATTY LIVER: Primary | ICD-10-CM

## 2022-04-07 PROCEDURE — 99999 PR PBB SHADOW E&M-EST. PATIENT-LVL III: ICD-10-PCS | Mod: PBBFAC,,, | Performed by: PODIATRIST

## 2022-04-07 PROCEDURE — 99214 OFFICE O/P EST MOD 30 MIN: CPT | Mod: S$PBB,,, | Performed by: PODIATRIST

## 2022-04-07 PROCEDURE — 99214 PR OFFICE/OUTPT VISIT, EST, LEVL IV, 30-39 MIN: ICD-10-PCS | Mod: S$PBB,,, | Performed by: PODIATRIST

## 2022-04-07 PROCEDURE — 99999 PR PBB SHADOW E&M-EST. PATIENT-LVL III: CPT | Mod: PBBFAC,,, | Performed by: PODIATRIST

## 2022-04-07 PROCEDURE — 99213 OFFICE O/P EST LOW 20 MIN: CPT | Mod: PBBFAC | Performed by: PODIATRIST

## 2022-04-07 RX ORDER — CLOTRIMAZOLE AND BETAMETHASONE DIPROPIONATE 10; .64 MG/G; MG/G
CREAM TOPICAL 2 TIMES DAILY
Qty: 45 G | Refills: 2 | Status: SHIPPED | OUTPATIENT
Start: 2022-04-07 | End: 2022-08-15

## 2022-04-07 RX ORDER — TERBINAFINE HYDROCHLORIDE 250 MG/1
250 TABLET ORAL DAILY
Qty: 90 TABLET | Refills: 0 | Status: SHIPPED | OUTPATIENT
Start: 2022-04-07 | End: 2022-05-07

## 2022-04-12 ENCOUNTER — HOSPITAL ENCOUNTER (OUTPATIENT)
Dept: RADIOLOGY | Facility: HOSPITAL | Age: 67
Discharge: HOME | End: 2022-04-12
Attending: OBSTETRICS & GYNECOLOGY
Payer: MEDICARE

## 2022-04-12 DIAGNOSIS — N64.59 ABNORMAL BREAST TISSUE: ICD-10-CM

## 2022-04-12 PROCEDURE — 77066 MAMMO DIGITAL DIAGNOSTIC BILAT WITH TOMO: ICD-10-PCS | Mod: 26,,, | Performed by: RADIOLOGY

## 2022-04-12 PROCEDURE — 77066 DX MAMMO INCL CAD BI: CPT | Mod: 26,,, | Performed by: RADIOLOGY

## 2022-04-12 PROCEDURE — 77062 MAMMO DIGITAL DIAGNOSTIC BILAT WITH TOMO: ICD-10-PCS | Mod: 26,,, | Performed by: RADIOLOGY

## 2022-04-12 PROCEDURE — 77062 BREAST TOMOSYNTHESIS BI: CPT | Mod: 26,,, | Performed by: RADIOLOGY

## 2022-04-12 PROCEDURE — 77066 DX MAMMO INCL CAD BI: CPT | Mod: TC

## 2022-04-16 NOTE — PROGRESS NOTES
Subjective:      Patient ID: Isha Leonard is a 66 y.o. female.    Chief Complaint: Nail Problem (Nail discoloration  and red spots on feet) and Foot Swelling    Isha is a 66 y.o. female who presents to the clinic complaining of thick and discolored toenails on both feet. Isha is inquiring about treatment options.      Review of Systems   Constitutional: Negative for chills, decreased appetite, fever and malaise/fatigue.   HENT: Negative for congestion, hearing loss, nosebleeds and tinnitus.    Eyes: Negative for double vision, pain, photophobia and visual disturbance.   Cardiovascular: Negative for chest pain, claudication, cyanosis and leg swelling.   Respiratory: Negative for cough, hemoptysis, shortness of breath and wheezing.    Endocrine: Negative for cold intolerance and heat intolerance.   Hematologic/Lymphatic: Negative for adenopathy and bleeding problem.   Skin: Positive for color change, dry skin, itching and nail changes. Negative for suspicious lesions.   Musculoskeletal: Negative for arthritis, joint pain, myalgias and stiffness.   Gastrointestinal: Negative for abdominal pain, jaundice, nausea and vomiting.   Genitourinary: Negative for dysuria, frequency and hematuria.   Neurological: Negative for difficulty with concentration, loss of balance, numbness, paresthesias and sensory change.   Psychiatric/Behavioral: Negative for altered mental status, hallucinations and suicidal ideas. The patient is not nervous/anxious.    Allergic/Immunologic: Negative for environmental allergies and persistent infections.           Objective:      Physical Exam  Vitals reviewed.   Constitutional:       Appearance: She is well-developed.   HENT:      Head: Normocephalic and atraumatic.   Cardiovascular:      Pulses:           Dorsalis pedis pulses are 2+ on the right side and 2+ on the left side.        Posterior tibial pulses are 2+ on the right side and 2+ on the left side.   Pulmonary:      Effort: Pulmonary effort is  normal.   Musculoskeletal:         General: Normal range of motion.      Comments: Inspection and palpation of the muscles joints and bones of both lower extremities reveal that muscle strength for the anterior lateral and posterior muscle groups and intrinsic muscle groups of the foot are all 5 over 5 symmetrical.  Ankle subtalar midtarsal and digital joint range of motion are within normal limits, nonpainful, without crepitus or effusion.  Patient exhibits a normal angle and base of gait.  Palpation of the tendons reveal no defects.   Skin:     General: Skin is warm and dry.      Capillary Refill: Capillary refill takes 2 to 3 seconds.      Comments: Skin turgor is normal bilaterally.  Skin texture is well hydrated to both lower extremities.  No lesions or rashes or wounds appreciated bilaterally.    Long, thickened, discolored  toenails 1-5 with subungual debris     Neurological:      Mental Status: She is alert and oriented to person, place, and time.      Comments: Sharp dull light touch vibratory proprioceptive sensation are intact bilaterally.  Deep tendon reflexes to patellar and Achilles tendon are symmetrical 2 over 4 bilaterally.  No ankle clonus or Babinski reflexes noted bilaterally.  Coordination is normal to both feet and lower extremities.   Psychiatric:         Behavior: Behavior normal.               Assessment:       Encounter Diagnoses   Name Primary?    Fatty liver: see u/s 2014; stable CT 2017: fibroscan 2019 F0/4 fibrosis and S3 (>2/3 steatosis). Yes    Onychomycosis due to dermatophyte     Ingrown nail     Disease of nail     Tinea pedis of both feet          Plan:       Isha was seen today for nail problem and foot swelling.    Diagnoses and all orders for this visit:    Fatty liver: see u/s 2014; stable CT 2017: fibroscan 2019 F0/4 fibrosis and S3 (>2/3 steatosis).  -     Hepatic function panel; Future    Onychomycosis due to dermatophyte    Ingrown nail    Disease of nail    Tinea pedis  of both feet    Other orders  -     terbinafine HCL (LAMISIL) 250 mg tablet; Take 1 tablet (250 mg total) by mouth once daily.  -     clotrimazole-betamethasone 1-0.05% (LOTRISONE) cream; Apply topically 2 (two) times daily.      I counseled the patient on her conditions, their implications and medical management.    The nature of the condition, options for management, as well as potential risks and complications were discussed in detail with patient. Patient was amenable to my recommendations and left my office fully informed and will follow up as instructed or sooner if necessary.      We discussed using Lamisil for onychomycosis. This drug offers a fairly high but not universal cure rate. A 12 week treatment course is recommended. The patient is aware that rare cases of liver injury have been reported; and agrees to come in for liver function tests at 6 weeks of treatment. The symptoms of liver disease have been discussed; call if such occurs. In addition, some insurance plans do not cover the expense of this drug for treating a cosmetic condition, and the patient understands they may have to pay for the medication. Other side effects, such as headaches and rashes, have also been discussed.    .

## 2022-05-16 ENCOUNTER — IMMUNIZATION (OUTPATIENT)
Dept: PHARMACY | Facility: CLINIC | Age: 67
End: 2022-05-16

## 2022-05-16 DIAGNOSIS — Z23 NEED FOR VACCINATION: Primary | ICD-10-CM

## 2022-08-15 ENCOUNTER — LAB VISIT (OUTPATIENT)
Dept: LAB | Facility: HOSPITAL | Age: 67
End: 2022-08-15
Attending: INTERNAL MEDICINE
Payer: MEDICARE

## 2022-08-15 ENCOUNTER — OFFICE VISIT (OUTPATIENT)
Dept: INTERNAL MEDICINE | Facility: CLINIC | Age: 67
End: 2022-08-15
Payer: MEDICARE

## 2022-08-15 VITALS
SYSTOLIC BLOOD PRESSURE: 122 MMHG | DIASTOLIC BLOOD PRESSURE: 80 MMHG | WEIGHT: 158.94 LBS | HEIGHT: 62 IN | BODY MASS INDEX: 29.25 KG/M2

## 2022-08-15 DIAGNOSIS — R73.02 GLUCOSE INTOLERANCE (IMPAIRED GLUCOSE TOLERANCE): ICD-10-CM

## 2022-08-15 DIAGNOSIS — J31.0 RHINITIS, UNSPECIFIED TYPE: Primary | ICD-10-CM

## 2022-08-15 DIAGNOSIS — K21.9 GASTROESOPHAGEAL REFLUX DISEASE, UNSPECIFIED WHETHER ESOPHAGITIS PRESENT: ICD-10-CM

## 2022-08-15 DIAGNOSIS — D12.6 COLON ADENOMA: ICD-10-CM

## 2022-08-15 DIAGNOSIS — E03.9 ACQUIRED HYPOTHYROIDISM: ICD-10-CM

## 2022-08-15 DIAGNOSIS — K76.0 FATTY LIVER: ICD-10-CM

## 2022-08-15 DIAGNOSIS — E66.3 OVERWEIGHT (BMI 25.0-29.9): ICD-10-CM

## 2022-08-15 LAB
ALBUMIN SERPL BCP-MCNC: 3.9 G/DL (ref 3.5–5.2)
ALP SERPL-CCNC: 74 U/L (ref 55–135)
ALT SERPL W/O P-5'-P-CCNC: 47 U/L (ref 10–44)
ANION GAP SERPL CALC-SCNC: 9 MMOL/L (ref 8–16)
AST SERPL-CCNC: 31 U/L (ref 10–40)
BILIRUB SERPL-MCNC: 0.6 MG/DL (ref 0.1–1)
BUN SERPL-MCNC: 20 MG/DL (ref 8–23)
CALCIUM SERPL-MCNC: 9.6 MG/DL (ref 8.7–10.5)
CHLORIDE SERPL-SCNC: 102 MMOL/L (ref 95–110)
CO2 SERPL-SCNC: 26 MMOL/L (ref 23–29)
CREAT SERPL-MCNC: 0.8 MG/DL (ref 0.5–1.4)
EST. GFR  (NO RACE VARIABLE): >60 ML/MIN/1.73 M^2
ESTIMATED AVG GLUCOSE: 123 MG/DL (ref 68–131)
GLUCOSE SERPL-MCNC: 98 MG/DL (ref 70–110)
HBA1C MFR BLD: 5.9 % (ref 4–5.6)
POTASSIUM SERPL-SCNC: 4.1 MMOL/L (ref 3.5–5.1)
PROT SERPL-MCNC: 7.5 G/DL (ref 6–8.4)
SODIUM SERPL-SCNC: 137 MMOL/L (ref 136–145)

## 2022-08-15 PROCEDURE — 99999 PR PBB SHADOW E&M-EST. PATIENT-LVL IV: CPT | Mod: PBBFAC,,, | Performed by: INTERNAL MEDICINE

## 2022-08-15 PROCEDURE — 99999 PR PBB SHADOW E&M-EST. PATIENT-LVL IV: ICD-10-PCS | Mod: PBBFAC,,, | Performed by: INTERNAL MEDICINE

## 2022-08-15 PROCEDURE — 99214 PR OFFICE/OUTPT VISIT, EST, LEVL IV, 30-39 MIN: ICD-10-PCS | Mod: S$PBB,,, | Performed by: INTERNAL MEDICINE

## 2022-08-15 PROCEDURE — 99214 OFFICE O/P EST MOD 30 MIN: CPT | Mod: PBBFAC | Performed by: INTERNAL MEDICINE

## 2022-08-15 PROCEDURE — 83036 HEMOGLOBIN GLYCOSYLATED A1C: CPT | Performed by: INTERNAL MEDICINE

## 2022-08-15 PROCEDURE — 99214 OFFICE O/P EST MOD 30 MIN: CPT | Mod: S$PBB,,, | Performed by: INTERNAL MEDICINE

## 2022-08-15 PROCEDURE — 80053 COMPREHEN METABOLIC PANEL: CPT | Performed by: INTERNAL MEDICINE

## 2022-08-15 PROCEDURE — 36415 COLL VENOUS BLD VENIPUNCTURE: CPT | Performed by: INTERNAL MEDICINE

## 2022-08-15 RX ORDER — KETOCONAZOLE 20 MG/G
CREAM TOPICAL 2 TIMES DAILY
COMMUNITY
Start: 2022-07-17 | End: 2022-10-24

## 2022-08-15 RX ORDER — FLUTICASONE PROPIONATE 50 MCG
2 SPRAY, SUSPENSION (ML) NASAL DAILY
Qty: 16 G | Refills: 3 | Status: SHIPPED | OUTPATIENT
Start: 2022-08-15

## 2022-08-15 NOTE — PROGRESS NOTES
"Subjective:       Patient ID: Isha Leonard is a 67 y.o. female.    Chief Complaint: Nasal Congestion    Nasal congestion, post-nasal drip.  Ear congestion.   Recall had been seen in ENT in [place].  Flonase no help.  Not sneezing.    Seen few months ago for her "annual."    BP slightly elevated today.  Had been traveling to take care of her grandkids.      Due for colonoscopy, this was ordered in March, never scheduled.  Discussed.    Patient Active Problem List:     Other hyperlipidemia     Acquired hypothyroidism     AR (allergic rhinitis)     Glucose intolerance (impaired glucose tolerance)     Intramural leiomyoma of uterus     Gallstones: see ultrasound 2014; CT 2017, also 2019; sludge seen 3/22     Colon adenoma: 2011- also 3/17: 5 year follow up recommended     Fatty liver: see u/s 2014; stable CT 2017: fibroscan 2019 F0/4 fibrosis and S3 (>2/3 steatosis).     Midline cystocele     Rectus diastasis     Urinary, incontinence, stress female     Diverticulosis of large intestine without hemorrhage: see CT scan May 2017     Bilateral renal cysts: see CT scan May 2017; not seen on u/s 2019     Osteopenia of multiple sites: see DEXA 2/18, also 4/22     Mild vitamin D deficiency     Helicobacter pylori ab+ in 2010, treated; negative stool 2/20     Gastroesophageal reflux disease         Review of Systems   Constitutional: Negative for chills, fatigue and fever.   HENT: Positive for congestion and postnasal drip. Negative for ear pain.    Eyes: Negative.    Respiratory: Negative for cough, chest tightness and shortness of breath.    Cardiovascular: Negative.    Gastrointestinal: Negative.  Negative for abdominal pain.   Musculoskeletal: Negative for arthralgias.   Skin: Negative for rash.       Objective:      Physical Exam  Constitutional:       Appearance: She is well-developed.   HENT:      Head: Normocephalic and atraumatic.      Right Ear: External ear normal.      Left Ear: External ear normal.   Eyes:      " Extraocular Movements: Extraocular movements intact.      Conjunctiva/sclera: Conjunctivae normal.   Neck:      Thyroid: No thyromegaly.   Cardiovascular:      Rate and Rhythm: Normal rate and regular rhythm.      Heart sounds: Normal heart sounds.   Pulmonary:      Effort: No respiratory distress.      Breath sounds: No wheezing or rales.   Abdominal:      General: Bowel sounds are normal.      Palpations: Abdomen is soft.   Musculoskeletal:      Cervical back: Normal range of motion and neck supple.   Lymphadenopathy:      Cervical: No cervical adenopathy.   Skin:     General: Skin is warm and dry.      Findings: No erythema or rash.   Neurological:      General: No focal deficit present.      Mental Status: She is alert and oriented to person, place, and time.   Psychiatric:         Mood and Affect: Mood normal.         Behavior: Behavior normal.         Thought Content: Thought content normal.         Judgment: Judgment normal.         Assessment:       1. Rhinitis, unspecified type    2. Glucose intolerance (impaired glucose tolerance)    3. Acquired hypothyroidism    4. Fatty liver: see u/s 2014; stable CT 2017: fibroscan 2019 F0/4 fibrosis and S3 (>2/3 steatosis).    5. Colon adenoma: 2011- also 3/17: 5 year follow up recommended    6. Gastroesophageal reflux disease, unspecified whether esophagitis present    7. Overweight (BMI 25.0-29.9)        Plan:         Isha was seen today for nasal congestion.    Diagnoses and all orders for this visit:    Rhinitis, unspecified type: no alarm sx or infection sx.  Trial of Flonase.  Follow up poor results    Glucose intolerance (impaired glucose tolerance)  -     Hemoglobin A1C; Future    Acquired hypothyroidism: labs acceptable March 2022    Fatty liver: see u/s 2014; stable CT 2017: fibroscan 2019 F0/4 fibrosis and S3 (>2/3 steatosis).  -     Comprehensive Metabolic Panel; Future    Colon adenoma: 2011- also 3/17: 5 year follow up recommended  -     Ambulatory  referral/consult to Endo Procedure ; Future    Gastroesophageal reflux disease, unspecified whether esophagitis present: diet reviewed.  Alarm sx reviewed.    GERD cautions discussed; diet, weight loss; avoid NSAIDs.  -     Ambulatory referral/consult to Endo Procedure ; Future    Overweight (BMI 25.0-29.9): portion control, exercise, weight loss    Other orders  -     fluticasone propionate (FLONASE) 50 mcg/actuation nasal spray; 2 sprays (100 mcg total) by Each Nostril route once daily.    I will review all studies and determine further tx depending on findings

## 2022-10-04 ENCOUNTER — IMMUNIZATION (OUTPATIENT)
Dept: PHARMACY | Facility: CLINIC | Age: 67
End: 2022-10-04
Payer: MEDICARE

## 2022-10-04 DIAGNOSIS — Z23 NEED FOR VACCINATION: Primary | ICD-10-CM

## 2022-10-06 ENCOUNTER — PATIENT MESSAGE (OUTPATIENT)
Dept: ENDOSCOPY | Facility: HOSPITAL | Age: 67
End: 2022-10-06

## 2022-10-06 ENCOUNTER — CLINICAL SUPPORT (OUTPATIENT)
Dept: ENDOSCOPY | Facility: HOSPITAL | Age: 67
End: 2022-10-06
Attending: INTERNAL MEDICINE
Payer: MEDICARE

## 2022-10-06 VITALS — BODY MASS INDEX: 27.29 KG/M2 | HEIGHT: 63 IN | WEIGHT: 154 LBS

## 2022-10-06 DIAGNOSIS — K21.9 GASTROESOPHAGEAL REFLUX DISEASE, UNSPECIFIED WHETHER ESOPHAGITIS PRESENT: ICD-10-CM

## 2022-10-06 DIAGNOSIS — K21.9 GASTROESOPHAGEAL REFLUX DISEASE, UNSPECIFIED WHETHER ESOPHAGITIS PRESENT: Primary | ICD-10-CM

## 2022-10-06 DIAGNOSIS — D12.6 COLON ADENOMA: ICD-10-CM

## 2022-10-06 RX ORDER — POLYETHYLENE GLYCOL 3350, SODIUM SULFATE ANHYDROUS, SODIUM BICARBONATE, SODIUM CHLORIDE, POTASSIUM CHLORIDE 236; 22.74; 6.74; 5.86; 2.97 G/4L; G/4L; G/4L; G/4L; G/4L
4 POWDER, FOR SOLUTION ORAL ONCE
Qty: 4000 ML | Refills: 0 | Status: SHIPPED | OUTPATIENT
Start: 2022-10-06 | End: 2022-10-06

## 2022-10-06 NOTE — PLAN OF CARE
Endoscopy procedures scheduled, prep instructions reviewed, Pt verbalized understanding.    Patient had referral also for EGD with GERD placed by Dr. Duarte. This was added to her Colonoscopy order and scheduled together for 10/24/22 with Dr. Rush.

## 2022-10-12 ENCOUNTER — TELEPHONE (OUTPATIENT)
Dept: GASTROENTEROLOGY | Facility: CLINIC | Age: 67
End: 2022-10-12
Payer: MEDICARE

## 2022-10-12 NOTE — TELEPHONE ENCOUNTER
----- Message from Kiana Stinson MA sent at 10/12/2022  4:49 PM CDT -----  Contact: @[phone]    ----- Message -----  From: Stephen Alas  Sent: 10/12/2022   4:31 PM CDT  To: Adri MORRISON Staff    Pt is calling in to see if she can reschedule her procedure due to a appt , please call to discuss further.

## 2022-10-13 ENCOUNTER — TELEPHONE (OUTPATIENT)
Dept: ENDOSCOPY | Facility: HOSPITAL | Age: 67
End: 2022-10-13
Payer: MEDICARE

## 2022-10-13 NOTE — TELEPHONE ENCOUNTER
Returned pts call about rescheduling EGD & Colonoscopy.  Left voicemail for pt to call the Endoscopy Scheduling Dept to reschedule.

## 2022-10-14 ENCOUNTER — IMMUNIZATION (OUTPATIENT)
Dept: INTERNAL MEDICINE | Facility: CLINIC | Age: 67
End: 2022-10-14
Payer: MEDICARE

## 2022-10-14 PROCEDURE — G0008 ADMIN INFLUENZA VIRUS VAC: HCPCS | Mod: PBBFAC

## 2022-10-21 ENCOUNTER — HOSPITAL ENCOUNTER (OUTPATIENT)
Facility: HOSPITAL | Age: 67
Discharge: HOME | End: 2022-10-21
Attending: INTERNAL MEDICINE | Admitting: INTERNAL MEDICINE
Payer: MEDICARE

## 2022-10-21 ENCOUNTER — ANESTHESIA EVENT (OUTPATIENT)
Dept: ENDOSCOPY | Facility: HOSPITAL | Age: 67
End: 2022-10-21
Payer: MEDICARE

## 2022-10-21 ENCOUNTER — ANESTHESIA (OUTPATIENT)
Dept: ENDOSCOPY | Facility: HOSPITAL | Age: 67
End: 2022-10-21
Payer: MEDICARE

## 2022-10-21 VITALS
SYSTOLIC BLOOD PRESSURE: 164 MMHG | HEIGHT: 63 IN | BODY MASS INDEX: 27.29 KG/M2 | OXYGEN SATURATION: 99 % | WEIGHT: 154 LBS | DIASTOLIC BLOOD PRESSURE: 81 MMHG | HEART RATE: 71 BPM | TEMPERATURE: 97 F | RESPIRATION RATE: 16 BRPM

## 2022-10-21 DIAGNOSIS — D12.6 COLON ADENOMAS: ICD-10-CM

## 2022-10-21 PROCEDURE — 25000003 PHARM REV CODE 250: Performed by: NURSE ANESTHETIST, CERTIFIED REGISTERED

## 2022-10-21 PROCEDURE — 88305 TISSUE EXAM BY PATHOLOGIST: CPT | Mod: 59 | Performed by: PATHOLOGY

## 2022-10-21 PROCEDURE — E9220 PRA ENDO ANESTHESIA: HCPCS | Mod: ,,, | Performed by: NURSE ANESTHETIST, CERTIFIED REGISTERED

## 2022-10-21 PROCEDURE — 43239 EGD BIOPSY SINGLE/MULTIPLE: CPT | Mod: 51,,, | Performed by: INTERNAL MEDICINE

## 2022-10-21 PROCEDURE — 63600175 PHARM REV CODE 636 W HCPCS: Performed by: NURSE ANESTHETIST, CERTIFIED REGISTERED

## 2022-10-21 PROCEDURE — 88305 TISSUE EXAM BY PATHOLOGIST: ICD-10-PCS | Mod: 26,,, | Performed by: PATHOLOGY

## 2022-10-21 PROCEDURE — 27201012 HC FORCEPS, HOT/COLD, DISP: Performed by: INTERNAL MEDICINE

## 2022-10-21 PROCEDURE — 43239 PR EGD, FLEX, W/BIOPSY, SGL/MULTI: ICD-10-PCS | Mod: 51,,, | Performed by: INTERNAL MEDICINE

## 2022-10-21 PROCEDURE — 45385 COLONOSCOPY W/LESION REMOVAL: CPT | Mod: PT | Performed by: INTERNAL MEDICINE

## 2022-10-21 PROCEDURE — E9220 PRA ENDO ANESTHESIA: ICD-10-PCS | Mod: ,,, | Performed by: NURSE ANESTHETIST, CERTIFIED REGISTERED

## 2022-10-21 PROCEDURE — 88342 CHG IMMUNOCYTOCHEMISTRY: ICD-10-PCS | Mod: 26,,, | Performed by: PATHOLOGY

## 2022-10-21 PROCEDURE — 45385 COLONOSCOPY W/LESION REMOVAL: CPT | Mod: PT,,, | Performed by: INTERNAL MEDICINE

## 2022-10-21 PROCEDURE — 25000003 PHARM REV CODE 250: Performed by: INTERNAL MEDICINE

## 2022-10-21 PROCEDURE — 27201089 HC SNARE, DISP (ANY): Performed by: INTERNAL MEDICINE

## 2022-10-21 PROCEDURE — 88342 IMHCHEM/IMCYTCHM 1ST ANTB: CPT | Performed by: PATHOLOGY

## 2022-10-21 PROCEDURE — 45385 PR COLONOSCOPY,REMV LESN,SNARE: ICD-10-PCS | Mod: PT,,, | Performed by: INTERNAL MEDICINE

## 2022-10-21 PROCEDURE — 88305 TISSUE EXAM BY PATHOLOGIST: CPT | Mod: 26,,, | Performed by: PATHOLOGY

## 2022-10-21 PROCEDURE — 88342 IMHCHEM/IMCYTCHM 1ST ANTB: CPT | Mod: 26,,, | Performed by: PATHOLOGY

## 2022-10-21 PROCEDURE — 37000008 HC ANESTHESIA 1ST 15 MINUTES: Performed by: INTERNAL MEDICINE

## 2022-10-21 PROCEDURE — 43239 EGD BIOPSY SINGLE/MULTIPLE: CPT | Performed by: INTERNAL MEDICINE

## 2022-10-21 PROCEDURE — 37000009 HC ANESTHESIA EA ADD 15 MINS: Performed by: INTERNAL MEDICINE

## 2022-10-21 RX ORDER — PROPOFOL 10 MG/ML
VIAL (ML) INTRAVENOUS
Status: DISCONTINUED | OUTPATIENT
Start: 2022-10-21 | End: 2022-10-21

## 2022-10-21 RX ORDER — SODIUM CHLORIDE 9 MG/ML
INJECTION, SOLUTION INTRAVENOUS CONTINUOUS
Status: DISCONTINUED | OUTPATIENT
Start: 2022-10-21 | End: 2022-10-21 | Stop reason: HOSPADM

## 2022-10-21 RX ORDER — LIDOCAINE HYDROCHLORIDE 20 MG/ML
INJECTION INTRAVENOUS
Status: DISCONTINUED | OUTPATIENT
Start: 2022-10-21 | End: 2022-10-21

## 2022-10-21 RX ORDER — PROPOFOL 10 MG/ML
VIAL (ML) INTRAVENOUS CONTINUOUS PRN
Status: DISCONTINUED | OUTPATIENT
Start: 2022-10-21 | End: 2022-10-21

## 2022-10-21 RX ADMIN — SODIUM CHLORIDE: 0.9 INJECTION, SOLUTION INTRAVENOUS at 07:10

## 2022-10-21 RX ADMIN — PROPOFOL 30 MG: 10 INJECTION, EMULSION INTRAVENOUS at 07:10

## 2022-10-21 RX ADMIN — GLYCOPYRROLATE 0.2 MG: 0.2 INJECTION, SOLUTION INTRAMUSCULAR; INTRAVITREAL at 07:10

## 2022-10-21 RX ADMIN — PROPOFOL 50 MG: 10 INJECTION, EMULSION INTRAVENOUS at 07:10

## 2022-10-21 RX ADMIN — LIDOCAINE HYDROCHLORIDE 100 MG: 20 INJECTION INTRAVENOUS at 07:10

## 2022-10-21 RX ADMIN — PROPOFOL 200 MCG/KG/MIN: 10 INJECTION, EMULSION INTRAVENOUS at 07:10

## 2022-10-21 NOTE — H&P
[facility] 4th [facility]  History & Physical    Subjective:      Chief Complaint/Reason for Admission:     Direct access EGD and Colonoscopy for GERD and personal history of colon adenomas.    Isha Leonard is a 67 y.o. female.    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     Past Surgical History:   Procedure Laterality Date    BREAST BIOPSY      Left, benign    COLONOSCOPY N/A 3/3/2017    Procedure: COLONOSCOPY;  Surgeon: Mauri Richardson MD;  Location: [facility] ([address] [place] [address]);  Service: Endoscopy;  Laterality: N/A;  pt req Dr Richardson    COLONOSCOPY W/ POLYPECTOMY      THYROIDECTOMY, PARTIAL      secondary to thyroid nodule     Family History   Problem Relation Age of Onset    Osteoporosis Mother     Diabetes Mother     Hypertension Mother     Heart disease Mother     Cancer Father         Bladder cancer    Diabetes Sister     Cancer Brother         Lung    No Known Problems Daughter     No Known Problems Daughter     No Known Problems Son     Breast cancer Neg Hx     Colon cancer Neg Hx     Eclampsia Neg Hx     Miscarriages / Stillbirths Neg Hx     Ovarian cancer Neg Hx      labor Neg Hx     Stroke Neg Hx     Cervical cancer Neg Hx     Endometrial cancer Neg Hx     Vaginal cancer Neg Hx      Social History     Tobacco Use    Smoking status: Never    Smokeless tobacco: Never   Substance Use Topics    Alcohol use: No    Drug use: No        PTA Medications   Medication Sig    levothyroxine (SYNTHROID) 125 MCG tablet Take 1 tablet (125 mcg total) by mouth once daily.    pantoprazole (PROTONIX) 40 MG tablet Take 40 mg by mouth once daily.    cholecalciferol, vitamin D3, (VITAMIN D3) 25 mcg (1,000 unit) capsule Take 1 capsule (1,000 Units total) by mouth once daily.    fluticasone propionate (FLONASE) 50 mcg/actuation nasal spray 2 sprays (100 mcg total) by Each Nostril route once daily.    ketoconazole (NIZORAL) 2 % cream Apply topically 2 (two) times daily.    levocetirizine (XYZAL) 5 MG tablet     sars-cov-2, covid-19 omicron, (MODERNA COVID BIVAL,18Y UP,-PF) 50 mcg/0.5 mL injection Inject into the muscle.     Review of patient's allergies indicates:  No Known Allergies     Review of Systems   Constitutional:  Negative for fever and weight loss.   Respiratory:  Negative for shortness of breath.    Cardiovascular:  Negative for chest pain.   Gastrointestinal:  Negative for blood in stool, diarrhea and melena.     Objective:      Vital Signs (Most Recent)  Temp: 98.2 °F (36.8 °C) (10/21/22 0736)  Pulse: 72 (10/21/22 0736)  Resp: 18 (10/21/22 0736)  BP: (!) 191/97 (10/21/22 0736)  SpO2: 99 % (10/21/22 0736)    Vital Signs Range (Last 24H):  Temp:  [98.2 °F (36.8 °C)]   Pulse:  [72]   Resp:  [18]   BP: (191)/(97)   SpO2:  [99 %]     Physical Exam  Constitutional:       Appearance: Normal appearance.   Cardiovascular:      Rate and Rhythm: Normal rate.   Pulmonary:      Effort: Pulmonary effort is normal.   Neurological:      Mental Status: She is alert and oriented to person, place, and time.         Assessment:      Direct access EGD and Colonoscopy for GERD and personal history of colon adenomas.      Plan:      Direct access EGD and Colonoscopy for GERD and personal history of colon adenomas.

## 2022-10-21 NOTE — [employer] PATIENT INSTRUCTIONS
Discharge Summary/Instructions after an Endoscopic Procedure  Patient Name: Isha Leonard  Patient MRN: [id number]  Patient YOB: 1955 Friday, October 21, 2022  Gabriel Rush MD  Dear patient,  As a result of recent federal legislation (The Federal Cures Act), you may   receive lab or pathology results from your procedure in your [facility]   account before your physician is able to contact you. Your physician or   their representative will relay the results to you with their   recommendations at their soonest availability.  Thank you,  RESTRICTIONS:  During your procedure today, you received medications for sedation.  These   medications may affect your judgment, balance and coordination.  Therefore,   for 24 hours, you have the following restrictions:   - DO NOT drive a car, operate machinery, make legal/financial decisions,   sign important papers or drink alcohol.    ACTIVITY:  Today: no heavy lifting, straining or running due to procedural   sedation/anesthesia.  The following day: return to full activity including work.  DIET:  Eat and drink normally unless instructed otherwise.     TREATMENT FOR COMMON SIDE EFFECTS:  - Mild abdominal pain, nausea, belching, bloating or excessive gas:  rest,   eat lightly and use a heating pad.  - Sore Throat: treat with throat lozenges and/or gargle with warm salt   water.  - Because air was used during the procedure, expelling large amounts of air   from your rectum or belching is normal.  - If a bowel prep was taken, you may not have a bowel movement for 1-3 days.    This is normal.  SYMPTOMS TO WATCH FOR AND REPORT TO YOUR PHYSICIAN:  1. Abdominal pain or bloating, other than gas cramps.  2. Chest pain.  3. Back pain.  4. Signs of infection such as: chills or fever occurring within 24 hours   after the procedure.  5. Rectal bleeding, which would show as bright red, maroon, or black stools.   (A tablespoon of blood from the rectum is not serious, especially if    hemorrhoids are present.)  6. Vomiting.  7. Weakness or dizziness.  GO DIRECTLY TO THE NEAREST EMERGENCY ROOM IF YOU HAVE ANY OF THE FOLLOWING:      Difficulty breathing              Chills and/or fever over 101 F   Persistent vomiting and/or vomiting blood   Severe abdominal pain   Severe chest pain   Black, tarry stools   Bleeding- more than one tablespoon   Any other symptom or condition that you feel may need urgent attention  Your doctor recommends these additional instructions:  If any biopsies were taken, your doctors clinic will contact you in 1 to 2   weeks with any results.  - Discharge patient to home (ambulatory).   - Resume previous diet.   - Continue present medications.   - Await pathology results.   - Telephone endoscopist for study results in 3 weeks.   - Discharge patient to home.   - Follow an antireflux regimen.   - Await pathology results.   - Telephone endoscopist for pathology results in 3 weeks.   - Return to referring physician.   - The findings and recommendations were discussed with the patient.  For questions, problems or results please call your physician - Gabriel Rush MD at Work:  [phone].  [facility], EMERGENCY ROOM PHONE NUMBER: [phone]  IF A COMPLICATION OR EMERGENCY SITUATION ARISES AND YOU ARE UNABLE TO REACH   YOUR PHYSICIAN - GO DIRECTLY TO THE EMERGENCY ROOM.  Gabriel Rush MD  10/21/2022 8:35:53 AM  This report has been verified and signed electronically.  Dear patient,  As a result of recent federal legislation (The Federal Cures Act), you may   receive lab or pathology results from your procedure in your [facility]   account before your physician is able to contact you. Your physician or   their representative will relay the results to you with their   recommendations at their soonest availability.  Thank you,  PROVATION

## 2022-10-21 NOTE — PROVATION PATIENT INSTRUCTIONS
Discharge Summary/Instructions after an Endoscopic Procedure  Patient Name: Isha Leonard  Patient MRN: [id number]  Patient YOB: 1955 Friday, October 21, 2022  Gabriel Rush MD  Dear patient,  As a result of recent federal legislation (The Federal Cures Act), you may   receive lab or pathology results from your procedure in your [facility]   account before your physician is able to contact you. Your physician or   their representative will relay the results to you with their   recommendations at their soonest availability.  Thank you,  RESTRICTIONS:  During your procedure today, you received medications for sedation.  These   medications may affect your judgment, balance and coordination.  Therefore,   for 24 hours, you have the following restrictions:   - DO NOT drive a car, operate machinery, make legal/financial decisions,   sign important papers or drink alcohol.    ACTIVITY:  Today: no heavy lifting, straining or running due to procedural   sedation/anesthesia.  The following day: return to full activity including work.  DIET:  Eat and drink normally unless instructed otherwise.     TREATMENT FOR COMMON SIDE EFFECTS:  - Mild abdominal pain, nausea, belching, bloating or excessive gas:  rest,   eat lightly and use a heating pad.  - Sore Throat: treat with throat lozenges and/or gargle with warm salt   water.  - Because air was used during the procedure, expelling large amounts of air   from your rectum or belching is normal.  - If a bowel prep was taken, you may not have a bowel movement for 1-3 days.    This is normal.  SYMPTOMS TO WATCH FOR AND REPORT TO YOUR PHYSICIAN:  1. Abdominal pain or bloating, other than gas cramps.  2. Chest pain.  3. Back pain.  4. Signs of infection such as: chills or fever occurring within 24 hours   after the procedure.  5. Rectal bleeding, which would show as bright red, maroon, or black stools.   (A tablespoon of blood from the rectum is not serious, especially if    hemorrhoids are present.)  6. Vomiting.  7. Weakness or dizziness.  GO DIRECTLY TO THE NEAREST EMERGENCY ROOM IF YOU HAVE ANY OF THE FOLLOWING:      Difficulty breathing              Chills and/or fever over 101 F   Persistent vomiting and/or vomiting blood   Severe abdominal pain   Severe chest pain   Black, tarry stools   Bleeding- more than one tablespoon   Any other symptom or condition that you feel may need urgent attention  Your doctor recommends these additional instructions:  If any biopsies were taken, your doctors clinic will contact you in 1 to 2   weeks with any results.  - Discharge patient to home.   - Await pathology results.   - Telephone endoscopist for pathology results in 3 weeks.   - Repeat colonoscopy in 5 years for surveillance.   - Return to referring physician.  For questions, problems or results please call your physician - Gabriel Rush MD at Work:  [phone].  [facility], EMERGENCY ROOM PHONE NUMBER: [phone]  IF A COMPLICATION OR EMERGENCY SITUATION ARISES AND YOU ARE UNABLE TO REACH   YOUR PHYSICIAN - GO DIRECTLY TO THE EMERGENCY ROOM.  Gabriel Rush MD  10/21/2022 8:27:44 AM  This report has been verified and signed electronically.  Dear patient,  As a result of recent federal legislation (The Federal Cures Act), you may   receive lab or pathology results from your procedure in your [facility]   account before your physician is able to contact you. Your physician or   their representative will relay the results to you with their   recommendations at their soonest availability.  Thank you,  PROVATION

## 2022-10-21 NOTE — ANESTHESIA PREPROCEDURE EVALUATION
10/21/2022  Isha Leonard is a 67 y.o., female.    Active Problem List with Overview Notes    Diagnosis Date Noted    Helicobacter pylori ab+ in 2010, treated; negative stool 2/20 01/15/2020    Gastroesophageal reflux disease 01/15/2020    Mild vitamin D deficiency 03/19/2018    Osteopenia of multiple sites: see DEXA 2/18, also 4/22 03/15/2018    Diverticulosis of large intestine without hemorrhage: see CT scan May 2017 05/05/2017    Bilateral renal cysts: see CT scan May 2017; not seen on u/s 2019 05/05/2017    Midline cystocele 02/08/2017    Rectus diastasis 02/08/2017    Urinary, incontinence, stress female 02/08/2017    Colon adenoma: 2011- also 3/17: 5 year follow up recommended 07/19/2016    Fatty liver: see u/s 2014; stable CT 2017: fibroscan 2019 F0/4 fibrosis and S3 (>2/3 steatosis). 07/19/2016    Intramural leiomyoma of uterus 02/17/2014    Gallstones: see ultrasound 2014; CT 2017, also 2019; sludge seen 3/22 02/17/2014    AR (allergic rhinitis) 02/28/2013    Glucose intolerance (impaired glucose tolerance)     Other hyperlipidemia 10/30/2012    Acquired hypothyroidism 10/30/2012     Past Surgical History:   Procedure Laterality Date    BREAST BIOPSY  2008    Left, benign    COLONOSCOPY N/A 3/3/2017    Procedure: COLONOSCOPY;  Surgeon: Mauri Richardson MD;  Location: [address]);  Service: Endoscopy;  Laterality: N/A;  pt req Dr Richardson    COLONOSCOPY W/ POLYPECTOMY      THYROIDECTOMY, PARTIAL      secondary to thyroid nodule       Pre-op Assessment    I have reviewed the Patient Summary Reports.    I have reviewed the NPO Status.   I have reviewed the Medications.     Review of Systems  Anesthesia Hx:  No problems with previous Anesthesia    Social:  Non-Smoker, Social Alcohol Use    Hematology/Oncology:  Hematology Normal   Oncology Normal     EENT/Dental:EENT/Dental Normal    Cardiovascular:   Hypertension    Pulmonary:   Pneumonia    Renal/:   Chronic Renal Disease    Hepatic/GI:   GERD Liver Disease,    Musculoskeletal:  Musculoskeletal Normal    Neurological:   Neuromuscular Disease,    Endocrine:   Hypothyroidism    Dermatological:  Skin Normal    Psych:  Psychiatric Normal           Physical Exam  General: Well nourished, Cooperative, Alert and Oriented    Airway:  Mallampati: II   Mouth Opening: Normal  TM Distance: Normal  Tongue: Normal  Neck ROM: Normal ROM    Dental:  Intact    Chest/Lungs:  Clear to auscultation, Normal Respiratory Rate    Heart:  Rate: Normal  Rhythm: Regular Rhythm        Anesthesia Plan  Type of Anesthesia, risks & benefits discussed:    Anesthesia Type: Gen Natural Airway  Intra-op Monitoring Plan: Standard ASA Monitors  Post Op Pain Control Plan: multimodal analgesia  Induction:  IV  Informed Consent: Informed consent signed with the Patient and all parties understand the risks and agree with anesthesia plan.  All questions answered.   ASA Score: 2  Day of Surgery Review of History & Physical: H&P Update referred to the surgeon/provider.    Ready For Surgery From Anesthesia Perspective.     .

## 2022-10-21 NOTE — TRANSFER OF CARE
"Anesthesia Transfer of Care Note    Patient: Isha Leonard    Procedure(s) Performed: Procedure(s) (LRB):  EGD (ESOPHAGOGASTRODUODENOSCOPY) (N/A)  COLONOSCOPY (N/A)    Patient location: GI    Anesthesia Type: general    Transport from OR: Transported from OR on room air with adequate spontaneous ventilation    Post pain: adequate analgesia    Post assessment: no apparent anesthetic complications and tolerated procedure well    Post vital signs: stable    Level of consciousness: awake, alert and oriented    Nausea/Vomiting: no nausea/vomiting    Complications: none    Transfer of care protocol was followed      Last vitals:   Visit Vitals  BP 95/62   Pulse 85   Temp 36.3 °C (97.4 °F) (Temporal)   Resp 16   Ht 5' 3" (1.6 m)   Wt 69.9 kg (154 lb)   SpO2 96%   Breastfeeding No   BMI 27.28 kg/m²     "

## 2022-10-24 ENCOUNTER — OFFICE VISIT (OUTPATIENT)
Dept: DERMATOLOGY | Facility: CLINIC | Age: 67
End: 2022-10-24
Payer: MEDICARE

## 2022-10-24 DIAGNOSIS — L72.3 INFLAMED EPIDERMOID CYST OF SKIN: Primary | ICD-10-CM

## 2022-10-24 DIAGNOSIS — L82.1 SK (SEBORRHEIC KERATOSIS): ICD-10-CM

## 2022-10-24 DIAGNOSIS — B00.1 HERPES LABIALIS: ICD-10-CM

## 2022-10-24 PROCEDURE — 11900 INJECT SKIN LESIONS </W 7: CPT | Mod: PBBFAC | Performed by: DERMATOLOGY

## 2022-10-24 PROCEDURE — 11900 PR INJECTION INTO SKIN LESIONS, UP TO 7: ICD-10-PCS | Mod: S$PBB,,, | Performed by: DERMATOLOGY

## 2022-10-24 PROCEDURE — 99204 PR OFFICE/OUTPT VISIT, NEW, LEVL IV, 45-59 MIN: ICD-10-PCS | Mod: 25,S$PBB,, | Performed by: DERMATOLOGY

## 2022-10-24 PROCEDURE — 99999 PR PBB SHADOW E&M-EST. PATIENT-LVL II: ICD-10-PCS | Mod: PBBFAC,,, | Performed by: DERMATOLOGY

## 2022-10-24 PROCEDURE — 99204 OFFICE O/P NEW MOD 45 MIN: CPT | Mod: 25,S$PBB,, | Performed by: DERMATOLOGY

## 2022-10-24 PROCEDURE — 99212 OFFICE O/P EST SF 10 MIN: CPT | Mod: PBBFAC | Performed by: DERMATOLOGY

## 2022-10-24 PROCEDURE — 99999 PR PBB SHADOW E&M-EST. PATIENT-LVL II: CPT | Mod: PBBFAC,,, | Performed by: DERMATOLOGY

## 2022-10-24 RX ORDER — VALACYCLOVIR HYDROCHLORIDE 1 G/1
TABLET, FILM COATED ORAL
Qty: 30 TABLET | Refills: 3 | Status: SHIPPED | OUTPATIENT
Start: 2022-10-24 | End: 2022-12-19

## 2022-10-24 NOTE — PROGRESS NOTES
Subjective:       Patient ID:  Isha Leonard is a 67 y.o. female who presents for   Chief Complaint   Patient presents with    Lesion     History of Present Illness: The patient presents for follow up of skin check.    The patient was last seen on: 01/12/2015 for shave bx to L cheek resulted as IDN    Other skin complaints: Pt also c/o lesion, R upper leg, x 1 wk, draining white, itching, tx: otc gel    Patient with new complaint of lesion(s)  Location: bottom lip  Duration: 5 days - h/o HSV labialis  Symptoms: swollen  Relieving factors/Previous treatments: otc gel    Patient with new complaint of lesion(s)  Location: R temple  Duration: few years  Symptoms: none  Relieving factors/Previous treatments: none           Review of Systems   Constitutional:  Negative for fever and chills.   Skin:  Negative for itching and rash.      Objective:    Physical Exam   Constitutional: She appears well-developed and well-nourished. No distress.   Genitourinary:         Neurological: She is alert and oriented to person, place, and time. She is not disoriented.   Psychiatric: She has a normal mood and affect.   Skin:   Areas Examined (abnormalities noted in diagram):   Head / Face Inspection Performed  Genitals / Buttocks / Groin Inspection Performed  RLE Inspected            Diagram Legend     Erythematous scaling macule/papule c/w actinic keratosis       Vascular papule c/w angioma      Pigmented verrucoid papule/plaque c/w seborrheic keratosis      Yellow umbilicated papule c/w sebaceous hyperplasia      Irregularly shaped tan macule c/w lentigo     1-2 mm smooth white papules consistent with Milia      Movable subcutaneous cyst with punctum c/w epidermal inclusion cyst      Subcutaneous movable cyst c/w pilar cyst      Firm pink to brown papule c/w dermatofibroma      Pedunculated fleshy papule(s) c/w skin tag(s)      Evenly pigmented macule c/w junctional nevus     Mildly variegated pigmented, slightly irregular-bordered  macule c/w mildly atypical nevus      Flesh colored to evenly pigmented papule c/w intradermal nevus       Pink pearly papule/plaque c/w basal cell carcinoma      Erythematous hyperkeratotic cursted plaque c/w SCC      Surgical scar with no sign of skin cancer recurrence      Open and closed comedones      Inflammatory papules and pustules      Verrucoid papule consistent consistent with wart     Erythematous eczematous patches and plaques     Dystrophic onycholytic nail with subungual debris c/w onychomycosis     Umbilicated papule    Erythematous-base heme-crusted tan verrucoid plaque consistent with inflamed seborrheic keratosis     Erythematous Silvery Scaling Plaque c/w Psoriasis     See annotation      Assessment / Plan:        Inflamed epidermoid cyst of skin  -     triamcinolone acetonide injection 10 mg  -     MS [id number] ACETONIDE INJ PER 10MG  -     MS INJECTION INTO SKIN LESIONS, UP TO 7    Intralesional Kenalog 5mg/cc (0.5 cc total) injected into 1 lesions on the left groin today after obtaining verbal consent including risk of surrounding hypopigmentation. Patient tolerated procedure well.    Units: 1  NDC for Kenalog 10mg/cc:  [id number]      Herpes labialis  -     valACYclovir (VALTREX) 1000 MG tablet; Take 2 pills po bid x 2 days prn first signs of outbreak  Dispense: 30 tablet; Refill: 3    SK (seborrheic keratosis)  These are benign inherited growths without a malignant potential. Reassurance given to patient. No treatment is necessary.   Treatment of benign, asymptomatic lesions may be considered cosmetic.               No follow-ups on file.

## 2022-10-28 LAB
FINAL PATHOLOGIC DIAGNOSIS: NORMAL
GROSS: NORMAL
Lab: NORMAL

## 2022-11-05 NOTE — PROGRESS NOTES
Bernabe your EGD colonoscopy pathology was benign no evidence gastric intestinal metaplasia no evidence gastric H pylori.  Your colon polyp was an adenoma.  That is a benign polyp.    Recommend you next surveillance colonoscopy in 5 years.    1. Pre pyloric antrum, biopsy:       -  Gastric antral mucosa with findings of reactive gastropathy       -  Immunohistochemical stain with appropriate control is negative for   Helicobacter pylori   Comment:  The specimen shows foveolar hyperplasia with associated lamina   propria edema and vascular ectasia, consistent with reactive gastropathy.   This pattern of injury is often seen in the setting of bile reflux, alcohol   use, stress ulceration and NSAID/aspirin use. There is no evidence of atrophy   or intestinal metaplasia.  The specimen is negative for dysplasia or   malignancy.   2. Random stomach, biopsy:       -  Gastric antral and oxyntic mucosa with minimal chronic superficial   gastritis and mild          reactive gastropathy       -  Immunohistochemical stain appropriate control is negative for   Helicobacter pylori   Comment:  Negative for atrophy or intestinal metaplasia.  Negative for   dysplasia or malignancy.   3. Rectal polyp (1 mm), biopsy:       -  Tubular adenoma       -   Negative for high-grade dysplasia or malignancy    Comment: Interp By Ruth Abreu M.D., Signed on 10/28/2022

## 2022-11-07 NOTE — ANESTHESIA POSTPROCEDURE EVALUATION
Anesthesia Post Evaluation    Patient: Isha Leonard    Procedure(s) Performed: Procedure(s) (LRB):  EGD (ESOPHAGOGASTRODUODENOSCOPY) (N/A)  COLONOSCOPY (N/A)    Final Anesthesia Type: general      Patient location during evaluation: PACU  Patient participation: Yes- Able to Participate  Level of consciousness: awake and alert and oriented  Post-procedure vital signs: reviewed and stable  Pain management: adequate  Airway patency: patent    PONV status at discharge: No PONV  Anesthetic complications: no      Cardiovascular status: hemodynamically stable  Respiratory status: unassisted, spontaneous ventilation and room air  Hydration status: euvolemic  Follow-up not needed.          Vitals Value Taken Time   /81 10/21/22 0853   Temp 36.3 °C (97.4 °F) 10/21/22 0823   Pulse 71 10/21/22 0853   Resp 16 10/21/22 0853   SpO2 99 % 10/21/22 0853         Event Time   Out of Recovery 08:54:30         Pain/Luca Score: No data recorded

## 2022-12-01 ENCOUNTER — TELEPHONE (OUTPATIENT)
Dept: INTERNAL MEDICINE | Facility: CLINIC | Age: 67
End: 2022-12-01
Payer: MEDICARE

## 2022-12-01 DIAGNOSIS — U07.1 COVID-19 VIRUS INFECTION: Primary | ICD-10-CM

## 2022-12-01 NOTE — TELEPHONE ENCOUNTER
----- Message from Kellie Altamirano sent at 12/1/2022  3:17 PM CST -----  Contact: Isha  or    [email or web address]     Patient is calling for Medical Advice regarding: fever cough & throat pain     How long has patient had these symptoms: about 2 days     Pharmacy name and phone#: [facility]  [address] in [place]     Would like response via [facility]: call back     Comments:Isha tested positive this morning for Covid. Pharmacy told her that they have the Paxlovid medication but she will ;need the doctor to call it in

## 2022-12-01 NOTE — TELEPHONE ENCOUNTER
Ind[place]ian tested positive this morning for Covid. Pharmacy told her that they have the Paxlovid medication but she will ;need the doctor to call it in

## 2022-12-19 ENCOUNTER — OFFICE VISIT (OUTPATIENT)
Dept: PRIMARY CARE CLINIC | Facility: CLINIC | Age: 67
End: 2022-12-19
Payer: MEDICARE

## 2022-12-19 VITALS
WEIGHT: 155.44 LBS | HEIGHT: 63 IN | OXYGEN SATURATION: 96 % | DIASTOLIC BLOOD PRESSURE: 76 MMHG | HEART RATE: 82 BPM | BODY MASS INDEX: 27.54 KG/M2 | TEMPERATURE: 98 F | SYSTOLIC BLOOD PRESSURE: 124 MMHG

## 2022-12-19 DIAGNOSIS — R31.9 HEMATURIA, UNSPECIFIED TYPE: Primary | ICD-10-CM

## 2022-12-19 DIAGNOSIS — N28.1 BILATERAL RENAL CYSTS: ICD-10-CM

## 2022-12-19 PROBLEM — N39.3 URINARY, INCONTINENCE, STRESS FEMALE: Status: RESOLVED | Noted: 2017-02-08 | Resolved: 2022-12-19

## 2022-12-19 LAB
BACTERIA #/AREA URNS AUTO: ABNORMAL /HPF
BILIRUB UR QL STRIP: NEGATIVE
CLARITY UR REFRACT.AUTO: ABNORMAL
COLOR UR AUTO: YELLOW
GLUCOSE UR QL STRIP: NEGATIVE
HGB UR QL STRIP: ABNORMAL
HYALINE CASTS UR QL AUTO: 0 /LPF
KETONES UR QL STRIP: NEGATIVE
LEUKOCYTE ESTERASE UR QL STRIP: ABNORMAL
MICROSCOPIC COMMENT: ABNORMAL
NITRITE UR QL STRIP: POSITIVE
PH UR STRIP: 6 [PH] (ref 5–8)
PROT UR QL STRIP: ABNORMAL
RBC #/AREA URNS AUTO: >100 /HPF (ref 0–4)
SP GR UR STRIP: 1.01 (ref 1–1.03)
URN SPEC COLLECT METH UR: ABNORMAL
WBC #/AREA URNS AUTO: >100 /HPF (ref 0–5)

## 2022-12-19 PROCEDURE — 99999 PR PBB SHADOW E&M-EST. PATIENT-LVL III: CPT | Mod: PBBFAC,,, | Performed by: INTERNAL MEDICINE

## 2022-12-19 PROCEDURE — 87077 CULTURE AEROBIC IDENTIFY: CPT | Performed by: INTERNAL MEDICINE

## 2022-12-19 PROCEDURE — 99213 OFFICE O/P EST LOW 20 MIN: CPT | Mod: PBBFAC | Performed by: INTERNAL MEDICINE

## 2022-12-19 PROCEDURE — 87186 SC STD MICRODIL/AGAR DIL: CPT | Performed by: INTERNAL MEDICINE

## 2022-12-19 PROCEDURE — 81001 URINALYSIS AUTO W/SCOPE: CPT | Performed by: INTERNAL MEDICINE

## 2022-12-19 PROCEDURE — 99214 OFFICE O/P EST MOD 30 MIN: CPT | Mod: S$PBB,,, | Performed by: INTERNAL MEDICINE

## 2022-12-19 PROCEDURE — 87088 URINE BACTERIA CULTURE: CPT | Performed by: INTERNAL MEDICINE

## 2022-12-19 PROCEDURE — 87086 URINE CULTURE/COLONY COUNT: CPT | Performed by: INTERNAL MEDICINE

## 2022-12-19 PROCEDURE — 99214 PR OFFICE/OUTPT VISIT, EST, LEVL IV, 30-39 MIN: ICD-10-PCS | Mod: S$PBB,,, | Performed by: INTERNAL MEDICINE

## 2022-12-19 PROCEDURE — 99999 PR PBB SHADOW E&M-EST. PATIENT-LVL III: ICD-10-PCS | Mod: PBBFAC,,, | Performed by: INTERNAL MEDICINE

## 2022-12-19 RX ORDER — CIPROFLOXACIN 500 MG/1
500 TABLET ORAL 2 TIMES DAILY
Qty: 20 TABLET | Refills: 0 | Status: SHIPPED | OUTPATIENT
Start: 2022-12-19 | End: 2022-12-29

## 2022-12-19 NOTE — PROGRESS NOTES
67-year-old woman with a history of allergic rhinitis, history of fibroids associated with a cystocele, history of renal cysts, hypothyroidism, glucose intolerance, vitamin-D deficiency here with a chief complaint of hematuria for 3 days.      History of present illness and pertinent review of systems:  The patient has a 3 day history of noting hematuria.  Patient has urgency, frequency, and had nocturia 3 times last night.  The nocturia is unusual.  Patient has dysuria.  Patient has had no fever chills or sweats.  Patient has no CVA pain.  Patient has had no recent urinary tract infection.  Patient is no longer sexually active.  She does not have any vaginal bleeding.  Patient gives a history of possible atrophic vaginitis.  It should be noted the patient has 1st language is Chinese and not English.    Remaining review of systems:  An 11 system review of systems was negative except for a recent colonoscopy which revealed polyps and approximately 3 weeks ago the patient had COVID but feels herself to be fully recovered except for some nasal symptoms.      .Physical Exam  Vitals and nursing note reviewed.   Constitutional:       General: She is not in acute distress.     Appearance: Normal appearance. She is not ill-appearing.   HENT:      Head: Atraumatic.      Nose: No congestion or rhinorrhea.      Mouth/Throat:      Mouth: Mucous membranes are moist.      Pharynx: Oropharynx is clear.   Eyes:      General: No scleral icterus.     Conjunctiva/sclera: Conjunctivae normal.   Neck:      Comments: Thyroid is without enlargement or bruit.  There is no obvious nodules.  Cardiovascular:      Rate and Rhythm: Normal rate and regular rhythm.      Pulses: Normal pulses.      Heart sounds: Normal heart sounds. No murmur heard.    No gallop.   Pulmonary:      Effort: Pulmonary effort is normal. No respiratory distress.      Breath sounds: Normal breath sounds. No wheezing or rhonchi.   Abdominal:      General: Bowel sounds  are normal. There is no distension.      Palpations: Abdomen is soft.      Tenderness: There is no abdominal tenderness. There is no right CVA tenderness or left CVA tenderness.      Comments: No hepatosplenomegaly.  No inguinal nodes.  No suprapubic tenderness.   Musculoskeletal:         General: No swelling or tenderness.      Cervical back: Neck supple. No tenderness.      Right lower leg: No edema.      Left lower leg: No edema.   Lymphadenopathy:      Cervical: No cervical adenopathy.   Skin:     Coloration: Skin is not jaundiced.      Findings: No bruising or erythema.   Neurological:      General: No focal deficit present.      Mental Status: She is alert and oriented to person, place, and time.      Gait: Gait normal.   Psychiatric:         Mood and Affect: Mood normal.     Assessment/plan:  Hematuria:  Most common cause of hematuria is UTI.  However in a woman who is older pathology of the bladder and kidneys need to be considered.  Because of the hematuria her urine culture should be considered high risk and therefore needs to be done.  Bleeding can be intermittent.  Plan:  Explanation of the above.  UA and culture without reflex because of the high risk nature of hematuria and the need to know she is not infected.  Ciprofloxacin 500 mg b.i.d. for 10 days which can be stopped if the cultures negative.  If the cultures negative the patient should be evaluated with an ultrasound of her bladder and kidney.    She will need a urology evaluation for likely cystoscope.    The patient should consume 30-40 oz of fluid and if she would like it could be in the form of cranberry juice or she can take cranberry tablets.    History of bilateral renal cysts:  The patient has no CVA tenderness and no history of pain yet it is possible for the cyst to either who is or rupture and produce hematuria.  While this is less likely in the presence of renal cyst is common it is a significant consideration if the culture is  negative   Plan:  As above    Time with this patient beginning the appointment at 135 and ending at 2:05 a.m. was 30 minutes.

## 2022-12-19 NOTE — PATIENT INSTRUCTIONS
Thank you for visiting today.  The most common cause of blood in her urine would be a urinary tract infection.  Other causes would include your renal cyst or possible things in your bladder or other problems with your kidney.  Today we have gotten a urinalysis and a urine culture based on your history of seeing blood.  I have started you on an antibiotic called ciprofloxacin which is a common only used antibiotic for urinary tract infection.  I will call you with the results of your urine culture and if it is negative you can stop the antibiotic.  If it is negative then you may need other forms of diagnostic tests they can not be performed at the clinic and would require you to be referred to Urology.  You may need an ultrasound of your kidneys and bladder.  If I call you the number in the evening comes up as no caller ID if I am able to call you during the day it will come up as [facility].

## 2022-12-21 ENCOUNTER — TELEPHONE (OUTPATIENT)
Dept: PRIMARY CARE CLINIC | Facility: CLINIC | Age: 67
End: 2022-12-21
Payer: MEDICARE

## 2022-12-21 LAB — BACTERIA UR CULT: ABNORMAL

## 2022-12-22 NOTE — TELEPHONE ENCOUNTER
Call the patient with the results of the urine culture and left message.  The patient did have a urinary tract infection with E coli which was sensitive to the ciprofloxacin which was prescribed.  Patient should finish all her medication.

## 2023-02-14 ENCOUNTER — OFFICE VISIT (OUTPATIENT)
Dept: DERMATOLOGY | Facility: CLINIC | Age: 68
End: 2023-02-14
Payer: MEDICARE

## 2023-02-14 DIAGNOSIS — B35.1 ONYCHOMYCOSIS: ICD-10-CM

## 2023-02-14 DIAGNOSIS — B35.3 TINEA PEDIS OF BOTH FEET: Primary | ICD-10-CM

## 2023-02-14 DIAGNOSIS — Z79.899 LONG-TERM USE OF HIGH-RISK MEDICATION: ICD-10-CM

## 2023-02-14 PROCEDURE — 99214 OFFICE O/P EST MOD 30 MIN: CPT | Mod: S$GLB,,, | Performed by: DERMATOLOGY

## 2023-02-14 PROCEDURE — 99214 PR OFFICE/OUTPT VISIT, EST, LEVL IV, 30-39 MIN: ICD-10-PCS | Mod: S$GLB,,, | Performed by: DERMATOLOGY

## 2023-02-14 RX ORDER — TERBINAFINE HYDROCHLORIDE 250 MG/1
TABLET ORAL
Qty: 28 TABLET | Refills: 0 | Status: SHIPPED | OUTPATIENT
Start: 2023-02-14 | End: 2023-07-05

## 2023-02-14 RX ORDER — PRENATAL VIT 91/IRON/FOLIC/DHA 28-975-200
COMBINATION PACKAGE (EA) ORAL 2 TIMES DAILY
Qty: 60 G | Refills: 5 | Status: SHIPPED | OUTPATIENT
Start: 2023-02-14

## 2023-02-14 NOTE — PROGRESS NOTES
Patient Information  Name: Isha Leonard  : 1955  MRN: [id number]     Referring Physician:  Self   Primary Care Physician:  Gwen Duarte MD   Date of Visit: 2023      Subjective:     History of Present lllness:    Isha Leonard is a 67 y.o. female who presents with a chief complaint of rash.  Location: right foot  Duration: 1 year  Signs/Symptoms: started as small red dots, then started to spread and itch after she applied Mometasone cream, she stated small blisters were present  Relieving factors/Prior treatments: Mometasone and Ammonium lactate did not help, OTC antifungal has been helping for the past few days    Clinical documentation obtained by nursing staff reviewed.    Review of Systems    Objective:   Physical Exam   Constitutional: She appears well-developed and well-nourished. No distress.   Neurological: She is alert and oriented to person, place, and time. She is not disoriented.   Psychiatric: She has a normal mood and affect.   Skin:   Areas Examined (abnormalities noted in diagram):   RLE Inspected  LLE Inspection Performed  Nails and Digits Inspection Performed         Diagram Legend     Erythematous scaling macule/papule c/w actinic keratosis       Vascular papule c/w angioma      Pigmented verrucoid papule/plaque c/w seborrheic keratosis      Yellow umbilicated papule c/w sebaceous hyperplasia      Irregularly shaped tan macule c/w lentigo     1-2 mm smooth white papules consistent with Milia      Movable subcutaneous cyst with punctum c/w epidermal inclusion cyst      Subcutaneous movable cyst c/w pilar cyst      Firm pink to brown papule c/w dermatofibroma      Pedunculated fleshy papule(s) c/w skin tag(s)      Evenly pigmented macule c/w junctional nevus     Mildly variegated pigmented, slightly irregular-bordered macule c/w mildly atypical nevus      Flesh colored to evenly pigmented papule c/w intradermal nevus       Pink pearly papule/plaque c/w basal cell carcinoma       Erythematous hyperkeratotic cursted plaque c/w SCC      Surgical scar with no sign of skin cancer recurrence      Open and closed comedones      Inflammatory papules and pustules      Verrucoid papule consistent consistent with wart     Erythematous eczematous patches and plaques     Dystrophic onycholytic nail with subungual debris c/w onychomycosis     Umbilicated papule    Erythematous-base heme-crusted tan verrucoid plaque consistent with inflamed seborrheic keratosis     Erythematous Silvery Scaling Plaque c/w Psoriasis     See annotation    No images are attached to the encounter or orders placed in the encounter.      [] Data reviewed  [] Prior external notes reviewed  [] Independent review of test  [] Management discussed with another provider  [] Independent historian    Assessment / Plan:        Tinea pedis of both feet  Onychomycosis  Long-term use of high-risk medication  - chronic problem with exacerbation/progression  Discussed treatment options with the patient, including risks, benefits, and side effects of oral Lamisil vs. topical treatments--will need oral tx in order to treat nails as well. Common side effects may include headache, skin rash, GI upset, and taste disturbances. Rare side effects may include severe skin rash or hepatitis.  Patient elected to proceed with oral Lamisil treatment.     If long term use warranted, will check labs monthly or after 6 weeks of use. Lamisil may interfere with certain medications including tricyclic antidepressants, cimetidine, rifampin. Minimize alcohol intake while on Lamisil, and stop statin medications if cholesterol is within normal limits.     Will take as pulse dose 1 week per month x 4 months to minimize exposure to/side effects of the medication.     -     terbinafine HCL (LAMISIL) 250 mg tablet; Take 1 tab PO daily x 1 week, then 3 weeks off, then repeat.  Dispense: 28 tablet; Refill: 0  -     terbinafine HCL (LAMISIL AT) 1 % cream; Apply topically 2  (two) times daily. Apply to feet BID.  Dispense: 60 g; Refill: 5  -     Hepatic Function Panel; Future; Expected date: 03/14/2023    Follow up in about 4 months (around 6/14/2023).      Lou Astudillo MD, FAAD  [facility]

## 2023-02-18 ENCOUNTER — OFFICE VISIT (OUTPATIENT)
Dept: INTERNAL MEDICINE | Facility: CLINIC | Age: 68
End: 2023-02-18
Payer: MEDICARE

## 2023-02-18 VITALS
DIASTOLIC BLOOD PRESSURE: 86 MMHG | WEIGHT: 158.94 LBS | SYSTOLIC BLOOD PRESSURE: 130 MMHG | BODY MASS INDEX: 28.16 KG/M2 | OXYGEN SATURATION: 97 % | HEIGHT: 63 IN | HEART RATE: 70 BPM

## 2023-02-18 DIAGNOSIS — B36.9 FUNGAL DERMATITIS: ICD-10-CM

## 2023-02-18 DIAGNOSIS — N64.4 BREAST PAIN: Primary | ICD-10-CM

## 2023-02-18 PROCEDURE — 99214 PR OFFICE/OUTPT VISIT, EST, LEVL IV, 30-39 MIN: ICD-10-PCS | Mod: S$PBB,,, | Performed by: INTERNAL MEDICINE

## 2023-02-18 PROCEDURE — 99999 PR PBB SHADOW E&M-EST. PATIENT-LVL IV: CPT | Mod: PBBFAC,,, | Performed by: INTERNAL MEDICINE

## 2023-02-18 PROCEDURE — 99214 OFFICE O/P EST MOD 30 MIN: CPT | Mod: PBBFAC | Performed by: INTERNAL MEDICINE

## 2023-02-18 PROCEDURE — 99999 PR PBB SHADOW E&M-EST. PATIENT-LVL IV: ICD-10-PCS | Mod: PBBFAC,,, | Performed by: INTERNAL MEDICINE

## 2023-02-18 PROCEDURE — 99214 OFFICE O/P EST MOD 30 MIN: CPT | Mod: S$PBB,,, | Performed by: INTERNAL MEDICINE

## 2023-02-18 NOTE — PROGRESS NOTES
Patient ID: Isha Leonard is a 67 y.o. female.    Chief Complaint: Breast Pain      Assessment:       1. Breast pain    2. Fungal dermatitis            Plan:         1. Breast pain  -     Mammo Digital Diagnostic Bilat  -     US Breast Bilateral Complete; Future; Expected date: 02/18/2023    2. Fungal dermatitis       Alarm symptoms reviewed  Continue regimen  Topical Lamisil recommended, follow-up poor results  I will review all studies and determine further tx depending on findings       Subjective:   Urgent visit    Breast discomfort for about a week    No trauma or injury.  Left breast.  No nipple discharge.  No change in exercise.  No exertional symptoms.  Up-to-date with mammography.    Also has dermatitis, recently seen in Dermatology in given oral medication as well as topicals.  She has not been using the topicals.    Patient Active Problem List:     Other hyperlipidemia     Acquired hypothyroidism     AR (allergic rhinitis)     Glucose intolerance (impaired glucose tolerance)     Intramural leiomyoma of uterus     Gallstones: see ultrasound 2014; CT 2017, also 2019; sludge seen 3/22     Colon adenoma: 2011- also 3/17 and 11/22 5 year follow up recommended     Fatty liver: see u/s 2014; stable CT 2017: fibroscan 2019 F0/4 fibrosis and S3 (>2/3 steatosis).     Midline cystocele     Rectus diastasis     Diverticulosis of large intestine without hemorrhage: see CT scan May 2017     Bilateral renal cysts: see CT scan May 2017; not seen on u/s 2019     Osteopenia of multiple sites: see DEXA 2/18, also 4/22     Mild vitamin D deficiency     Helicobacter pylori ab+ in 2010, treated; negative stool 2/20     Gastroesophageal reflux disease: mild gastritits and reactive gastropathy on EGD 11/22 no H pylori       Review of Systems   Constitutional:  Negative for fever.   Respiratory:  Negative for chest tightness.    Cardiovascular:  Negative for chest pain.   Skin:  Positive for rash.       Objective:      Physical  Exam  Constitutional:       Appearance: She is well-developed.   HENT:      Head: Normocephalic and atraumatic.   Eyes:      Extraocular Movements: Extraocular movements intact.      Conjunctiva/sclera: Conjunctivae normal.   Neck:      Thyroid: No thyromegaly.   Cardiovascular:      Rate and Rhythm: Normal rate and regular rhythm.   Pulmonary:      Effort: Pulmonary effort is normal. No respiratory distress.      Breath sounds: Normal breath sounds.   Genitourinary:     Comments: BREASTS:  Fibrocystic tissue left breast, no dominant lesions.  No nipple discharge lymphadenopathy  No masses or nipple discharge on the right.  Slight fullness in the right axillary area- patient states not new  Skin:     Comments: Dermatitis lower leg   Neurological:      General: No focal deficit present.      Mental Status: She is alert and oriented to person, place, and time.   Psychiatric:         Mood and Affect: Mood normal.         Behavior: Behavior normal.         Thought Content: Thought content normal.         Judgment: Judgment normal.           Health Maintenance Due   Topic Date Due    Mammogram  04/12/2023

## 2023-03-08 ENCOUNTER — HOSPITAL ENCOUNTER (OUTPATIENT)
Dept: RADIOLOGY | Facility: HOSPITAL | Age: 68
Discharge: HOME | End: 2023-03-08
Attending: INTERNAL MEDICINE
Payer: MEDICARE

## 2023-03-08 DIAGNOSIS — N64.4 BREAST PAIN: ICD-10-CM

## 2023-03-08 PROCEDURE — 77066 MAMMO DIGITAL DIAGNOSTIC BILAT WITH TOMO: ICD-10-PCS | Mod: 26,,, | Performed by: RADIOLOGY

## 2023-03-08 PROCEDURE — 77062 BREAST TOMOSYNTHESIS BI: CPT | Mod: 26,,, | Performed by: RADIOLOGY

## 2023-03-08 PROCEDURE — 77066 DX MAMMO INCL CAD BI: CPT | Mod: 26,,, | Performed by: RADIOLOGY

## 2023-03-08 PROCEDURE — 77066 DX MAMMO INCL CAD BI: CPT | Mod: TC

## 2023-03-08 PROCEDURE — 77062 MAMMO DIGITAL DIAGNOSTIC BILAT WITH TOMO: ICD-10-PCS | Mod: 26,,, | Performed by: RADIOLOGY

## 2023-06-30 ENCOUNTER — LAB VISIT (OUTPATIENT)
Dept: LAB | Facility: HOSPITAL | Age: 68
End: 2023-06-30
Attending: INTERNAL MEDICINE
Payer: MEDICARE

## 2023-06-30 DIAGNOSIS — E03.9 ACQUIRED HYPOTHYROIDISM: ICD-10-CM

## 2023-06-30 DIAGNOSIS — E78.49 OTHER HYPERLIPIDEMIA: ICD-10-CM

## 2023-06-30 DIAGNOSIS — R73.02 GLUCOSE INTOLERANCE (IMPAIRED GLUCOSE TOLERANCE): ICD-10-CM

## 2023-06-30 LAB
ALBUMIN SERPL BCP-MCNC: 3.7 G/DL (ref 3.5–5.2)
ALP SERPL-CCNC: 92 U/L (ref 55–135)
ALT SERPL W/O P-5'-P-CCNC: 35 U/L (ref 10–44)
ANION GAP SERPL CALC-SCNC: 13 MMOL/L (ref 8–16)
AST SERPL-CCNC: 23 U/L (ref 10–40)
BASOPHILS # BLD AUTO: 0.04 K/UL (ref 0–0.2)
BASOPHILS NFR BLD: 0.6 % (ref 0–1.9)
BILIRUB SERPL-MCNC: 0.8 MG/DL (ref 0.1–1)
BUN SERPL-MCNC: 16 MG/DL (ref 8–23)
CALCIUM SERPL-MCNC: 9.7 MG/DL (ref 8.7–10.5)
CHLORIDE SERPL-SCNC: 102 MMOL/L (ref 95–110)
CHOLEST SERPL-MCNC: 196 MG/DL (ref 120–199)
CHOLEST/HDLC SERPL: 3.6 {RATIO} (ref 2–5)
CO2 SERPL-SCNC: 26 MMOL/L (ref 23–29)
CREAT SERPL-MCNC: 0.8 MG/DL (ref 0.5–1.4)
DIFFERENTIAL METHOD: ABNORMAL
EOSINOPHIL # BLD AUTO: 0.2 K/UL (ref 0–0.5)
EOSINOPHIL NFR BLD: 3.3 % (ref 0–8)
ERYTHROCYTE [DISTWIDTH] IN BLOOD BY AUTOMATED COUNT: 12.7 % (ref 11.5–14.5)
EST. GFR  (NO RACE VARIABLE): >60 ML/MIN/1.73 M^2
ESTIMATED AVG GLUCOSE: 126 MG/DL (ref 68–131)
GLUCOSE SERPL-MCNC: 100 MG/DL (ref 70–110)
HBA1C MFR BLD: 6 % (ref 4–5.6)
HCT VFR BLD AUTO: 42.9 % (ref 37–48.5)
HDLC SERPL-MCNC: 55 MG/DL (ref 40–75)
HDLC SERPL: 28.1 % (ref 20–50)
HGB BLD-MCNC: 13.4 G/DL (ref 12–16)
IMM GRANULOCYTES # BLD AUTO: 0.01 K/UL (ref 0–0.04)
IMM GRANULOCYTES NFR BLD AUTO: 0.1 % (ref 0–0.5)
LDLC SERPL CALC-MCNC: 109.8 MG/DL (ref 63–159)
LYMPHOCYTES # BLD AUTO: 3.3 K/UL (ref 1–4.8)
LYMPHOCYTES NFR BLD: 46 % (ref 18–48)
MCH RBC QN AUTO: 27.9 PG (ref 27–31)
MCHC RBC AUTO-ENTMCNC: 31.2 G/DL (ref 32–36)
MCV RBC AUTO: 89 FL (ref 82–98)
MONOCYTES # BLD AUTO: 0.5 K/UL (ref 0.3–1)
MONOCYTES NFR BLD: 6.6 % (ref 4–15)
NEUTROPHILS # BLD AUTO: 3.1 K/UL (ref 1.8–7.7)
NEUTROPHILS NFR BLD: 43.4 % (ref 38–73)
NONHDLC SERPL-MCNC: 141 MG/DL
NRBC BLD-RTO: 0 /100 WBC
PLATELET # BLD AUTO: 397 K/UL (ref 150–450)
PMV BLD AUTO: 9.9 FL (ref 9.2–12.9)
POTASSIUM SERPL-SCNC: 4 MMOL/L (ref 3.5–5.1)
PROT SERPL-MCNC: 7.5 G/DL (ref 6–8.4)
RBC # BLD AUTO: 4.8 M/UL (ref 4–5.4)
SODIUM SERPL-SCNC: 141 MMOL/L (ref 136–145)
T4 FREE SERPL-MCNC: 1.19 NG/DL (ref 0.71–1.51)
TRIGL SERPL-MCNC: 156 MG/DL (ref 30–150)
TSH SERPL DL<=0.005 MIU/L-ACNC: 0.07 UIU/ML (ref 0.4–4)
WBC # BLD AUTO: 7.24 K/UL (ref 3.9–12.7)

## 2023-06-30 PROCEDURE — 84439 ASSAY OF FREE THYROXINE: CPT | Performed by: INTERNAL MEDICINE

## 2023-06-30 PROCEDURE — 84443 ASSAY THYROID STIM HORMONE: CPT | Performed by: INTERNAL MEDICINE

## 2023-06-30 PROCEDURE — 36415 COLL VENOUS BLD VENIPUNCTURE: CPT | Performed by: INTERNAL MEDICINE

## 2023-06-30 PROCEDURE — 80053 COMPREHEN METABOLIC PANEL: CPT | Performed by: INTERNAL MEDICINE

## 2023-06-30 PROCEDURE — 85025 COMPLETE CBC W/AUTO DIFF WBC: CPT | Performed by: INTERNAL MEDICINE

## 2023-06-30 PROCEDURE — 83036 HEMOGLOBIN GLYCOSYLATED A1C: CPT | Performed by: INTERNAL MEDICINE

## 2023-06-30 PROCEDURE — 80061 LIPID PANEL: CPT | Performed by: INTERNAL MEDICINE

## 2023-07-03 ENCOUNTER — TELEPHONE (OUTPATIENT)
Dept: INTERNAL MEDICINE | Facility: CLINIC | Age: 68
End: 2023-07-03
Payer: MEDICARE

## 2023-07-03 ENCOUNTER — LAB VISIT (OUTPATIENT)
Dept: LAB | Facility: HOSPITAL | Age: 68
End: 2023-07-03
Attending: PHYSICIAN ASSISTANT
Payer: MEDICARE

## 2023-07-03 DIAGNOSIS — E03.9 ACQUIRED HYPOTHYROIDISM: Primary | ICD-10-CM

## 2023-07-03 DIAGNOSIS — K57.30 DIVERTICULOSIS OF LARGE INTESTINE WITHOUT HEMORRHAGE: ICD-10-CM

## 2023-07-03 DIAGNOSIS — R30.0 DYSURIA: ICD-10-CM

## 2023-07-03 DIAGNOSIS — R73.02 GLUCOSE INTOLERANCE (IMPAIRED GLUCOSE TOLERANCE): ICD-10-CM

## 2023-07-03 LAB
BILIRUB UR QL STRIP: NEGATIVE
CLARITY UR REFRACT.AUTO: CLEAR
COLOR UR AUTO: YELLOW
GLUCOSE UR QL STRIP: NEGATIVE
HGB UR QL STRIP: NEGATIVE
KETONES UR QL STRIP: NEGATIVE
LEUKOCYTE ESTERASE UR QL STRIP: ABNORMAL
MICROSCOPIC COMMENT: ABNORMAL
NITRITE UR QL STRIP: NEGATIVE
PH UR STRIP: 6 [PH] (ref 5–8)
PROT UR QL STRIP: NEGATIVE
RBC #/AREA URNS AUTO: 5 /HPF (ref 0–4)
SP GR UR STRIP: 1.02 (ref 1–1.03)
SQUAMOUS #/AREA URNS AUTO: 3 /HPF
URN SPEC COLLECT METH UR: ABNORMAL
WBC #/AREA URNS AUTO: 4 /HPF (ref 0–5)

## 2023-07-03 PROCEDURE — 81003 URINALYSIS AUTO W/O SCOPE: CPT | Mod: 59 | Performed by: INTERNAL MEDICINE

## 2023-07-03 PROCEDURE — 81001 URINALYSIS AUTO W/SCOPE: CPT | Performed by: INTERNAL MEDICINE

## 2023-07-03 NOTE — TELEPHONE ENCOUNTER
Matt. Sorry for the confusion. I saw that she had labs in June, wasn't sure if you wanted more.    I just spoke to her. She will give a urine sample today. Is available 10 or 11 am Wednesday.

## 2023-07-03 NOTE — TELEPHONE ENCOUNTER
Dr. Duarte - OK to schedule Wed at 11am. Notified patient. They will bring paperwork from surgery.  Apt scheduled

## 2023-07-03 NOTE — TELEPHONE ENCOUNTER
This needs an appt  OK to work her in- Susana after you are done OK to route to [facility]    Make sure she brings in papers from [place]    Please have her complete labs and urine before appt orders in thanks

## 2023-07-03 NOTE — TELEPHONE ENCOUNTER
Called and spoke to patient. Pt requested to move annual to 1st week of Sept. Pt leaving town Thursday and will be returning the 1st week of Sept.   Apt moved.    Pt notified Dr. Duarte is out of office until THursday. Pt wants to discuss post surgery ([place]) status. Did have difficulty urinating and that has since improved. Is not an emergent issue.    Pt would also like to discuss lab results with MD, sooner.    911 precautions discussed.   Message routed to Dr. Duarte

## 2023-07-03 NOTE — TELEPHONE ENCOUNTER
----- Message from Maude Lora sent at 7/1/2023 11:38 AM CDT -----  Contact: [phone] Patient  Pt states [place] last [place].  Pt states she had a surgical procedure in [place].  Pt is requesting to discuss with Dr Duarte 7/3/2023 - 7/5/2023. Please reply thru the pt portal or call & advise.

## 2023-07-05 ENCOUNTER — OFFICE VISIT (OUTPATIENT)
Dept: INTERNAL MEDICINE | Facility: CLINIC | Age: 68
End: 2023-07-05
Payer: MEDICARE

## 2023-07-05 VITALS
OXYGEN SATURATION: 99 % | HEART RATE: 70 BPM | WEIGHT: 156.5 LBS | BODY MASS INDEX: 27.73 KG/M2 | DIASTOLIC BLOOD PRESSURE: 84 MMHG | HEIGHT: 63 IN | SYSTOLIC BLOOD PRESSURE: 123 MMHG

## 2023-07-05 DIAGNOSIS — N30.00 ACUTE CYSTITIS WITHOUT HEMATURIA: Primary | ICD-10-CM

## 2023-07-05 DIAGNOSIS — E03.9 ACQUIRED HYPOTHYROIDISM: ICD-10-CM

## 2023-07-05 DIAGNOSIS — N81.11 MIDLINE CYSTOCELE: ICD-10-CM

## 2023-07-05 PROCEDURE — 87086 URINE CULTURE/COLONY COUNT: CPT | Performed by: INTERNAL MEDICINE

## 2023-07-05 PROCEDURE — 99214 OFFICE O/P EST MOD 30 MIN: CPT | Mod: S$PBB,,, | Performed by: INTERNAL MEDICINE

## 2023-07-05 PROCEDURE — 99213 OFFICE O/P EST LOW 20 MIN: CPT | Mod: PBBFAC | Performed by: INTERNAL MEDICINE

## 2023-07-05 PROCEDURE — 99214 PR OFFICE/OUTPT VISIT, EST, LEVL IV, 30-39 MIN: ICD-10-PCS | Mod: S$PBB,,, | Performed by: INTERNAL MEDICINE

## 2023-07-05 PROCEDURE — 99999 PR PBB SHADOW E&M-EST. PATIENT-LVL III: ICD-10-PCS | Mod: PBBFAC,,, | Performed by: INTERNAL MEDICINE

## 2023-07-05 PROCEDURE — 99999 PR PBB SHADOW E&M-EST. PATIENT-LVL III: CPT | Mod: PBBFAC,,, | Performed by: INTERNAL MEDICINE

## 2023-07-05 RX ORDER — LEVOTHYROXINE SODIUM 112 UG/1
112 TABLET ORAL DAILY
Qty: 90 TABLET | Refills: 3 | Status: SHIPPED | OUTPATIENT
Start: 2023-07-05 | End: 2023-11-27

## 2023-07-05 RX ORDER — OMEPRAZOLE 40 MG/1
40 CAPSULE, DELAYED RELEASE ORAL DAILY
Qty: 30 CAPSULE | Refills: 11 | Status: SHIPPED | OUTPATIENT
Start: 2023-07-05 | End: 2024-07-04

## 2023-07-05 NOTE — PROGRESS NOTES
Patient ID: Isha Leonard is a 68 y.o. female.    Chief Complaint:  Hospital follow-up    Assessment:       1. Acute cystitis without hematuria    2. Acquired hypothyroidism    3. Midline cystocele          Plan:         1. Acute cystitis without hematuria  -     CULTURE, URINE    2. Acquired hypothyroidism    3. Midline cystocele    Other orders  -     levothyroxine (SYNTHROID) 112 MCG tablet; Take 1 tablet (112 mcg total) by mouth once daily.  Dispense: 90 tablet; Refill: 3  -     omeprazole (PRILOSEC) 40 MG capsule; Take 1 capsule (40 mg total) by mouth once daily.  Dispense: 30 capsule; Refill: 11       Agree with reestablishing with urogynecologist here in the [place]- she is leaving for [place] tomorrow  Send urine for formal culture, most recent urinalysis was acceptable  Alarm symptoms and ED cautions reviewed  Labs reviewed  Continue current regimen until further notice    Subjective:   Last year mild prolapse- did some pelvic PT    In [facility] for 2 months- then had trouble walking.  Prolapse bad and had surgery in [facility]- vaginal wall anterior repair and Dandridge operation was done.    Daughter who is an MD gives history also.  Daughter is in [place]- [facility] [place]; has her scheduled to see a urogynecologist at her institution and also have an MRI of the spine.  She will be leaving tomorrow.    Shortened cervix and pulled bladder up- no mesh used.  Went home same day, but then had urinary retention and Ramirez placed. Had to keep the Ramirez in for almost a month (several voiding trials failed apparently).    Did see a urologist- had a cysto that showed some bladder inflammation and a UTI.  When Ramirez was removed she then had to self-cath.  Several courses of antibiotics.  She is now voiding normally, roughly every 3 hours.  No fever, chills or sweats or hematuria.    Also some lumbar radiculopathy.    Patient Active Problem List:     Other hyperlipidemia     Acquired  hypothyroidism     AR (allergic rhinitis)     Glucose intolerance (impaired glucose tolerance)     Intramural leiomyoma of uterus     Gallstones: see ultrasound 2014; CT 2017, also 2019; sludge seen 3/22     Colon adenoma: 2011- also 3/17 and 11/22 5 year follow up recommended     Fatty liver: see u/s 2014; stable CT 2017: fibroscan 2019 F0/4 fibrosis and S3 (>2/3 steatosis).     Midline cystocele     Rectus diastasis     Diverticulosis of large intestine without hemorrhage: see CT scan May 2017     Bilateral renal cysts: see CT scan May 2017; not seen on u/s 2019     Osteopenia of multiple sites: see DEXA 2/18, also 4/22     Mild vitamin D deficiency     Helicobacter pylori ab+ in 2010, treated; negative stool 2/20     Gastroesophageal reflux disease: mild gastritits and reactive gastropathy on EGD 11/22 no H pylori             Review of Systems   Constitutional:  Negative for fever.   Respiratory:  Negative for cough and shortness of breath.    Cardiovascular:  Negative for chest pain and leg swelling.   Gastrointestinal:  Negative for abdominal pain and vomiting.   Genitourinary:  Positive for frequency. Negative for flank pain and urgency.        See above   Musculoskeletal:  Positive for back pain.       Objective:      Physical Exam  Vitals and nursing note reviewed.   Constitutional:       Appearance: She is well-developed.   HENT:      Head: Normocephalic and atraumatic.      Right Ear: External ear normal.      Left Ear: External ear normal.      Nose: Nose normal.      Mouth/Throat:      Pharynx: No oropharyngeal exudate.   Eyes:      General: No scleral icterus.     Extraocular Movements: Extraocular movements intact.      Conjunctiva/sclera: Conjunctivae normal.   Neck:      Thyroid: No thyromegaly.      Vascular: No JVD.   Cardiovascular:      Rate and Rhythm: Normal rate and regular rhythm.      Heart sounds: Normal heart sounds. No murmur heard.    No gallop.   Pulmonary:      Effort: Pulmonary effort  is normal. No respiratory distress.      Breath sounds: Normal breath sounds. No wheezing.   Abdominal:      General: Bowel sounds are normal. There is no distension.      Palpations: Abdomen is soft. There is no mass.      Tenderness: There is no abdominal tenderness. There is no guarding or rebound.   Musculoskeletal:         General: No tenderness. Normal range of motion.      Cervical back: Normal range of motion and neck supple.   Lymphadenopathy:      Cervical: No cervical adenopathy.   Skin:     General: Skin is warm.      Findings: No erythema or rash.   Neurological:      General: No focal deficit present.      Mental Status: She is alert and oriented to person, place, and time.      Cranial Nerves: No cranial nerve deficit.      Coordination: Coordination normal.   Psychiatric:         Behavior: Behavior normal.         Thought Content: Thought content normal.         Judgment: Judgment normal.           Health Maintenance Due   Topic Date Due    COVID-19 Vaccine (6 - Moderna series) 02/04/2023

## 2023-07-07 LAB
BACTERIA UR CULT: NORMAL
BACTERIA UR CULT: NORMAL

## 2023-11-27 ENCOUNTER — TELEPHONE (OUTPATIENT)
Dept: PULMONOLOGY | Facility: HOSPITAL | Age: 68
End: 2023-11-27
Payer: MEDICARE

## 2023-11-27 ENCOUNTER — OFFICE VISIT (OUTPATIENT)
Dept: INTERNAL MEDICINE | Facility: CLINIC | Age: 68
End: 2023-11-27
Payer: MEDICARE

## 2023-11-27 VITALS
DIASTOLIC BLOOD PRESSURE: 80 MMHG | SYSTOLIC BLOOD PRESSURE: 130 MMHG | BODY MASS INDEX: 28 KG/M2 | HEIGHT: 63 IN | WEIGHT: 158 LBS

## 2023-11-27 DIAGNOSIS — E78.49 OTHER HYPERLIPIDEMIA: ICD-10-CM

## 2023-11-27 DIAGNOSIS — D12.6 COLON ADENOMA: ICD-10-CM

## 2023-11-27 DIAGNOSIS — D25.1 INTRAMURAL LEIOMYOMA OF UTERUS: ICD-10-CM

## 2023-11-27 DIAGNOSIS — E03.9 ACQUIRED HYPOTHYROIDISM: ICD-10-CM

## 2023-11-27 DIAGNOSIS — I10 ESSENTIAL HYPERTENSION: Primary | ICD-10-CM

## 2023-11-27 DIAGNOSIS — R73.02 GLUCOSE INTOLERANCE (IMPAIRED GLUCOSE TOLERANCE): ICD-10-CM

## 2023-11-27 DIAGNOSIS — M85.89 OSTEOPENIA OF MULTIPLE SITES: ICD-10-CM

## 2023-11-27 DIAGNOSIS — G47.33 OSA (OBSTRUCTIVE SLEEP APNEA): ICD-10-CM

## 2023-11-27 DIAGNOSIS — N94.9 ADNEXAL CYST: ICD-10-CM

## 2023-11-27 PROCEDURE — 99999 PR PBB SHADOW E&M-EST. PATIENT-LVL III: ICD-10-PCS | Mod: PBBFAC,,, | Performed by: INTERNAL MEDICINE

## 2023-11-27 PROCEDURE — 99213 OFFICE O/P EST LOW 20 MIN: CPT | Mod: PBBFAC | Performed by: INTERNAL MEDICINE

## 2023-11-27 PROCEDURE — 99215 PR OFFICE/OUTPT VISIT, EST, LEVL V, 40-54 MIN: ICD-10-PCS | Mod: S$PBB,,, | Performed by: INTERNAL MEDICINE

## 2023-11-27 PROCEDURE — 99999 PR PBB SHADOW E&M-EST. PATIENT-LVL III: CPT | Mod: PBBFAC,,, | Performed by: INTERNAL MEDICINE

## 2023-11-27 PROCEDURE — 99215 OFFICE O/P EST HI 40 MIN: CPT | Mod: S$PBB,,, | Performed by: INTERNAL MEDICINE

## 2023-11-27 RX ORDER — LOSARTAN POTASSIUM 25 MG/1
25 TABLET ORAL
COMMUNITY
Start: 2023-10-21 | End: 2023-11-27 | Stop reason: SDUPTHER

## 2023-11-27 RX ORDER — LOSARTAN POTASSIUM 25 MG/1
50 TABLET ORAL DAILY
Qty: 180 TABLET | Refills: 3 | Status: SHIPPED | OUTPATIENT
Start: 2023-11-27

## 2023-11-27 RX ORDER — ESTRADIOL 0.1 MG/G
1 CREAM VAGINAL
COMMUNITY
Start: 2023-07-17 | End: 2023-11-29

## 2023-11-27 RX ORDER — LEVOTHYROXINE SODIUM 75 UG/1
75 TABLET ORAL
COMMUNITY
Start: 2023-08-31

## 2023-11-27 NOTE — PROGRESS NOTES
" Patient ID: Isha Leonard is a 68 y.o. female.    Chief Complaint: Follow-up      Assessment:       1. Essential hypertension    2. Acquired hypothyroidism    3. Glucose intolerance (impaired glucose tolerance)    4. Colon adenoma: 2011- also 3/17 and 11/22 5 year follow up recommended    5. Osteopenia of multiple sites: see DEXA 2/18, also 4/22    6. Other hyperlipidemia    7. Adnexal cyst: see u/s and MRI from [facility] 2023    8. Intramural leiomyoma of uterus: see u/s and MRI from [facility] 2023    9. NELSON (obstructive sleep apnea)          Plan:         1. Essential hypertension    2. Acquired hypothyroidism    3. Glucose intolerance (impaired glucose tolerance)    4. Colon adenoma: 2011- also 3/17 and 11/22 5 year follow up recommended    5. Osteopenia of multiple sites: see DEXA 2/18, also 4/22    6. Other hyperlipidemia    7. Adnexal cyst: see u/s and MRI from [facility] 2023  -     Ambulatory referral/consult to Gynecology; Future; Expected date: 12/04/2023  -     US Pelvis Complete Non OB; Future; Expected date: 11/27/2023    8. Intramural leiomyoma of uterus: see u/s and MRI from [facility] 2023    9. NELSON (obstructive sleep apnea)  -     Home Sleep Study; Future    Other orders  -     losartan (COZAAR) 25 MG tablet; Take 2 tablets (50 mg total) by mouth once daily.  Dispense: 180 tablet; Refill: 3       NELSON issues reviewed, sleep hygiene discussed  Low salt diet, exercise, 10-# weight loss in next 6-12 months' time.  Call if BP > 130/80 on a regular basis.  Pelvic ultrasound and gyn follow-up  Home sleep study  Increase fluids, fiber, exercise  Would recommend follow-up labs within the next 3 months  I will review all studies and determine further tx depending on findings  Patient evaluated for over 40 minutes with this appoinment, including diagnostic testing and treatment.  All questions answered,  chart reviewed and care coordinated.      Subjective:   Follow-up multiple medical issues, "annual."    Very complicated course " "in the past year: In [place] for 2 months- then had trouble walking.  Prolapse bad and had surgery in [facility]- vaginal wall anterior repair and Yimi operation was done.     Shortened cervix and pulled bladder up- no mesh used.  Went home same day, but then had urinary retention and Ramirez placed. Had to keep the Ramirez in for almost a month (several voiding trials failed apparently).     Did see a urologist- had a cysto that showed some bladder inflammation and a UTI.  When Ramirez was removed she then had to self-cath.  Several courses of antibiotics.  She is now voiding normally.    Also some lumbar radiculopathy.  Daughter who is an MD gave me the history also when I saw her in the summer.  Daughter is in [place]- [facility]- [facility]; had her scheduled to see a urogynecologist at her institution and also to have an MRI of the spine.       This was completed in July 2023:  "1. Degenerative changes most pronounced at L4-5 where there is grade 1 anterolisthesis and an annular fissure as well as at L5-S1 where there is severe facet arthropathy. No nerve/root impingement or high-grade neural foraminal or spinal canal stenosis.   2. Partially imaged right adnexal cystic lesion which may represent a benign ovarian cyst however given postmenopausal status, correlation with pelvic ultrasound suggested. "    MRI of the pelvis was also completed in July 2023:  "1. Myomatous uterus as described above.   2. A 4.4 x 2.2 cm cystic structure along the right adnexa is incompletely imaged. Whether this is related to a paraovarian cyst or hydrosalpinx is unclear. Follow-up pelvic ultrasound for further characterization and to assess for stability or resolution is recommended.   3. Inferior descent of the anorectal junction below the pubococcygeal line may be related to the rectocele. "    She has seen Urology also at [facility] as well as endocrinology and Internal Medicine due to new onset hypertension.    "Bernabe Leonard was " seen on 8/31/2023 for initial endocrine evaluation, accompanied by her  and daughter  Aida Leonard. Patient is a retired , who resides in [place]. PCP is Dr. Gwen Duarte. She reports a history of Graves disease, for which she underwent a partial thyroidectomy in 1980. Reports that she did not start to take levothyroxine until 1995. Levothyroxine dose has been progressively increased from the initial dose of 25 mcg daily to 125 mcg daily over the years. When visiting her daughter in [place], she had a lab work done on 6/30/23, which showed a suppressed TSH at 0.07, and she then decreased her levothyroxine dose to half (half a pill daily). A repeat TFT on 8/2/23 showed an elevated TSH at 7, with a normal free T4 at 0.83 and a normal total T3 at 103. No specific hyper-or hypo-thyroid symptoms were reported during the interval.    Upon inquiry, she takes levothyroxine close to breakfast. She does not take any dietary supplements in the morning (no multivitamin or calcium, etc.)    She has no exophthalmos or conjunctival injection. Although VAN was detected, she has no known history of other autoimmune conditions.    She has a history of osteopenia by DEXA in March 2022 with the lowest T-score at -2.1 in the femoral neck. No history of fragility fracture.    PMH includes a history of cystocele and rectocele, status post [place] procedure and anterior repair in [facility] in 2023. She was seen by Dr. Ayaka Nair here a few weeks ago.    1. Postsurgical hypothyroidism  -history of Graves disease, status post partial thyroidectomy  -no extrathyroidal manifestation  -it appears that her fluctuating thyroid function levels are in part due to suboptimal absorption of the levothyroxine, and we have reviewed how to correctly take thyroid medication at length with no difficulty  -recommend levothyroxine 75 mcg p.o. daily in the morning, at least 30 minutes before breakfast. She will refrain  "from taking any dietary supplements in the morning (she does not)  -it appears that she needs less than a full thyroid replacement dose, which suggests residual functioning thyroid tissue - thyroid ultrasound for further examination  -recommend repeat lab work in about 3 months to include TSH and free T4. Should also check thyroid autoantibodies.    2. Disorder of bone density/structure and vitamin-D deficiency  -osteopenia by DEXA in March 2022  -no specific treatment at this time, but consider repeating DEXA in 2024  -recommend vitamin D3 1000 IU daily  -fall/injury precautions  -labs next year: CMP, 25-OH vitamin-D, phosphorus, PTH, SPEP, c-telopeptide and alkaline phosphatase-bone (future)    Labs also showed hyperglycemia with a plasma glucose of 114 in the morning (patient was unsure if it was a fasting lab). Will repeat fasting labs and check a hemoglobin A1c and a lipid profile.    Will call to review lab and thyroid ultrasound results and further recommendation as appropriate    Patient will need to a repeat lab in 3 month's time to check TFT. She may forward us lab results, and we will recommend further dose adjustment as needed.    Otherwise, she will see us back next year when she visits her daughter in [place]. Repeat DEXA should be considered at that time.    Electronically Signed by Isha Chery MD, August 31, 2023"    She had a thyroid ultrasound in September 2023 which was acceptable.  She also had a pelvic ultrasound in September 2023 at [place] with the following results:  "Persistent cystic structure along the right adnexa which is not significantly changed when compared to the prior MRI. The lesion is cystic, unilocular, without thickened septations or irregular wall. This may be related to a serous cystadenoma, though other cystic ovarian lesions cannot be excluded."    She would like to be established here in gyn and have another pelvic ultrasound.  She would like to have a sleep study.  All of " this has been ordered.  However, she is leaving this week to go to [facility] [place].    General, blood pressure doing better although sometimes the diastolic is still slightly elevated.  She is taking 25 mg of losartan in the evenings.  She denies syncope, chest pain, pressure, tightness or shortness a breath.    Patient Active Problem List:     Other hyperlipidemia     Acquired hypothyroidism     AR (allergic rhinitis)     Glucose intolerance (impaired glucose tolerance)     Intramural leiomyoma of uterus: see u/s and MRI from [facility] 2023     Gallstones: see ultrasound 2014; CT 2017, also 2019; sludge seen 3/22     Colon adenoma: 2011- also 3/17 and 11/22 5 year follow up recommended     Fatty liver: see u/s 2014; stable CT 2017: fibroscan 2019 F0/4 fibrosis and S3 (>2/3 steatosis).     Midline cystocele     Rectus diastasis     Diverticulosis of large intestine without hemorrhage: see CT scan May 2017     Bilateral renal cysts: see CT scan May 2017; not seen on u/s 2019     Osteopenia of multiple sites: see DEXA 2/18, also 4/22     Mild vitamin D deficiency     Helicobacter pylori ab+ in 2010, treated; negative stool 2/20     Gastroesophageal reflux disease: mild gastritits and reactive gastropathy on EGD 11/22 no H pylori     Adnexal cyst: see u/s and MRI from [facility] 2023     Essential hypertension             The 10-year ASCVD risk score (Westley CRAWFORD, et al., 2019) is: 11.6%    Values used to calculate the score:      Age: 68 years      Sex: Female      Is Non- : No      Diabetic: No      Tobacco smoker: No      Systolic Blood Pressure: 130 mmHg      Is BP treated: Yes      HDL Cholesterol: 55 mg/dL      Total Cholesterol: 196 mg/dL     Review of Systems   Constitutional:  Negative for fatigue.   HENT:  Negative for hearing loss.    Eyes:  Negative for visual disturbance.   Respiratory:  Negative for shortness of breath.         Snoring, concern for sleep apnea   Cardiovascular:  Negative for chest pain.         Had a blood pressure monitor, those results are not available to me   Gastrointestinal:  Negative for abdominal pain, blood in stool, constipation, diarrhea and rectal pain.        Slight sluggishness of bowels but no pencil thin stools and no blood in the stool.  She thinks this may have started with her blood pressure meds    Recall colonoscopy last year was acceptable   Genitourinary:  Negative for dysuria, frequency and vaginal bleeding.   Musculoskeletal:  Negative for joint swelling.   Skin:  Negative for rash.   Neurological:  Negative for weakness, light-headedness and headaches.   Psychiatric/Behavioral:  Negative for sleep disturbance.          Objective:      Physical Exam  Vitals and nursing note reviewed.   Constitutional:       Appearance: She is well-developed.   HENT:      Head: Normocephalic and atraumatic.      Right Ear: External ear normal.      Left Ear: External ear normal.      Nose: Nose normal.      Mouth/Throat:      Pharynx: No oropharyngeal exudate.   Eyes:      General: No scleral icterus.     Extraocular Movements: Extraocular movements intact.      Conjunctiva/sclera: Conjunctivae normal.   Neck:      Thyroid: No thyromegaly.      Vascular: No JVD.      Comments: Well healed thyroid scar  Cardiovascular:      Rate and Rhythm: Normal rate and regular rhythm.      Heart sounds: Normal heart sounds. No murmur heard.     No gallop.   Pulmonary:      Effort: Pulmonary effort is normal. No respiratory distress.      Breath sounds: Normal breath sounds. No wheezing.   Abdominal:      General: Bowel sounds are normal. There is no distension.      Palpations: Abdomen is soft. There is no mass.      Tenderness: There is no abdominal tenderness. There is no guarding or rebound.   Musculoskeletal:         General: No tenderness. Normal range of motion.      Cervical back: Normal range of motion and neck supple.   Lymphadenopathy:      Cervical: No cervical adenopathy.   Skin:     General:  Skin is warm.      Findings: No erythema or rash.   Neurological:      General: No focal deficit present.      Mental Status: She is alert and oriented to person, place, and time.      Cranial Nerves: No cranial nerve deficit.      Coordination: Coordination normal.   Psychiatric:         Behavior: Behavior normal.         Thought Content: Thought content normal.         Judgment: Judgment normal.             Health Maintenance Due   Topic Date Due    COVID-19 Vaccine (7 - 2023-24 season) 12/13/2023

## 2023-11-29 ENCOUNTER — OFFICE VISIT (OUTPATIENT)
Dept: OBSTETRICS AND GYNECOLOGY | Facility: CLINIC | Age: 68
End: 2023-11-29
Payer: MEDICARE

## 2023-11-29 VITALS
HEIGHT: 63 IN | SYSTOLIC BLOOD PRESSURE: 121 MMHG | DIASTOLIC BLOOD PRESSURE: 80 MMHG | WEIGHT: 157.88 LBS | BODY MASS INDEX: 27.97 KG/M2

## 2023-11-29 DIAGNOSIS — N94.9 ADNEXAL CYST: ICD-10-CM

## 2023-11-29 DIAGNOSIS — N89.8 VAGINAL ITCHING: ICD-10-CM

## 2023-11-29 DIAGNOSIS — Z01.419 ENCOUNTER FOR ANNUAL ROUTINE GYNECOLOGICAL EXAMINATION: Primary | ICD-10-CM

## 2023-11-29 PROCEDURE — 99999 PR PBB SHADOW E&M-EST. PATIENT-LVL IV: CPT | Mod: PBBFAC,,, | Performed by: STUDENT IN AN ORGANIZED HEALTH CARE EDUCATION/TRAINING PROGRAM

## 2023-11-29 PROCEDURE — 99214 OFFICE O/P EST MOD 30 MIN: CPT | Mod: PBBFAC,25 | Performed by: STUDENT IN AN ORGANIZED HEALTH CARE EDUCATION/TRAINING PROGRAM

## 2023-11-29 PROCEDURE — G0101 PR CA SCREEN;PELVIC/BREAST EXAM: ICD-10-PCS | Mod: S$PBB,GZ,, | Performed by: STUDENT IN AN ORGANIZED HEALTH CARE EDUCATION/TRAINING PROGRAM

## 2023-11-29 PROCEDURE — 81514 NFCT DS BV&VAGINITIS DNA ALG: CPT | Performed by: STUDENT IN AN ORGANIZED HEALTH CARE EDUCATION/TRAINING PROGRAM

## 2023-11-29 PROCEDURE — 99999 PR PBB SHADOW E&M-EST. PATIENT-LVL IV: ICD-10-PCS | Mod: PBBFAC,,, | Performed by: STUDENT IN AN ORGANIZED HEALTH CARE EDUCATION/TRAINING PROGRAM

## 2023-11-29 PROCEDURE — G0101 CA SCREEN;PELVIC/BREAST EXAM: HCPCS | Mod: S$PBB,GZ,, | Performed by: STUDENT IN AN ORGANIZED HEALTH CARE EDUCATION/TRAINING PROGRAM

## 2023-11-29 PROCEDURE — G0101 CA SCREEN;PELVIC/BREAST EXAM: HCPCS | Mod: PBBFAC | Performed by: STUDENT IN AN ORGANIZED HEALTH CARE EDUCATION/TRAINING PROGRAM

## 2023-11-29 RX ORDER — ESTRADIOL 10 UG/1
INSERT VAGINAL
Qty: 30 TABLET | Refills: 5 | Status: SHIPPED | OUTPATIENT
Start: 2023-11-29

## 2023-11-29 RX ORDER — CLOTRIMAZOLE 1 %
CREAM (GRAM) TOPICAL
Qty: 30 G | Refills: 1 | Status: SHIPPED | OUTPATIENT
Start: 2023-11-29 | End: 2024-11-28

## 2023-11-29 NOTE — PROGRESS NOTES
Subjective:      Patient ID: Isha Leonard is a 68 y.o. female.    Chief Complaint:  Ovarian Cyst (Patient presents for an ovarian cyst(right) She does not feel any pain from it.)      History of Present Illness  69 yo  presents for WWE with no acute complaints today  Reports some vulvar itching for about 1 month but no change in discharge. Is sometimes sexually active, had tried estrace cream but found it was hard to apply  Pap UTD and negative last 3 screenings, can discontinue Pap screening.     History of stable, simple appearing 5cm adnexal mass    Annual Exam-Postmenopausal  Patient presents for annual exam. The patient has no complaints today. The patient is not currently sexually active. GYN screening history: last pap: was normal and last mammogram: was normal. The patient is not taking hormone replacement therapy. Patient denies post-menopausal vaginal bleeding. The patient wears seatbelts: yes. The patient participates in regular exercise: yes. Has the patient ever been transfused or tattooed?:  No . The patient reports that there is not domestic violence in her life.    GYN & OB History  No LMP recorded. Patient is postmenopausal.   Date of Last Pap: No result found    OB History    Para Term  AB Living   4 4 4     4   SAB IAB Ectopic Multiple Live Births           4      # Outcome Date GA Lbr Emerson/2nd Weight Sex Delivery Anes PTL Lv   4 Term      Vag-Spont  N JUDI   3 Term      Vag-Spont  N JUDI   2 Term      Vag-Spont  N JUDI   1 Term      Vag-Spont  N JUDI       Review of Systems  Review of Systems   All other systems reviewed and are negative.       Objective:     Physical Exam:   Constitutional: She appears well-developed and well-nourished.    HENT:   Head: Normocephalic and atraumatic.    Eyes: Conjunctivae and EOM are normal.      Pulmonary/Chest: Right breast exhibits no mass and no nipple discharge. Left breast exhibits no mass and no nipple discharge.        Abdominal: Soft.      Genitourinary:    Urethra, bladder, vagina, uterus, left adnexa and rectum normal.      Pelvic exam was performed with patient in the lithotomy position.   The external female genitalia was normal.   Genitalia hair distrobution normal .   Cervix is normal. Right adnexum displays tenderness. Vaginal atrophy noted. Normal urethral meatus.          Musculoskeletal: Normal range of motion.       Neurological: She is alert.    Skin: Skin is warm and dry.          Assessment:     1. Encounter for annual routine gynecological examination    2. Adnexal cyst: see u/s and MRI from [facility] 2023    3. Vaginal itching          Plan:     1. Encounter for annual routine gynecological examination  - Pap no longer indicated.  3 consecutive normal cytology screenings  - Screening tests as ordered.  - Diet and exercise encouraged.    Counseling: injury prevention: Seatbelts  Exercise  Health Screens: Mammography  Colonoscopy:discussed  Bone Density  Health Maintenance reviewed    2. Adnexal cyst: see u/s and MRI from [facility] 2023  - Last imaging in September, simple appearance and stable in size, will CTM with repeat imaging  - Ambulatory referral/consult to Gynecology  - US Pelvis Comp with Transvag NON-OB (xpd; Future    3. Vaginal itching  - Vaginosis swab collected today  - estradioL (VAGIFEM) 10 mcg Tab; Place one tablet vaginally nightly x 14 days, then place one tablet twice weekly  Dispense: 30 tablet; Refill: 5  - clotrimazole (LOTRIMIN) 1 % cream; Apply to affected area 2 times daily  Dispense: 30 g; Refill: 1      Plan to repeat imaging for surveillance of adnexal mass. Other age-based screenings managed by primary  Follow up for WWE in 1 year     Sai Luna III, MD  [facility] - OB GYN   [address]  [place] [id number] [phone]

## 2023-11-30 ENCOUNTER — TELEPHONE (OUTPATIENT)
Dept: SLEEP MEDICINE | Facility: HOSPITAL | Age: 68
End: 2023-11-30
Payer: MEDICARE

## 2024-02-27 DIAGNOSIS — Z00.00 ENCOUNTER FOR MEDICARE ANNUAL WELLNESS EXAM: ICD-10-CM

## 2024-05-01 DIAGNOSIS — Z78.0 MENOPAUSE: ICD-10-CM

## 2024-05-20 ENCOUNTER — HOSPITAL ENCOUNTER (OUTPATIENT)
Dept: RADIOLOGY | Facility: CLINIC | Age: 69
Discharge: HOME | End: 2024-05-20
Attending: INTERNAL MEDICINE
Payer: MEDICARE

## 2024-05-20 DIAGNOSIS — Z78.0 MENOPAUSE: ICD-10-CM

## 2024-05-20 PROCEDURE — 77080 DXA BONE DENSITY AXIAL: CPT | Mod: TC

## 2024-05-20 PROCEDURE — 77080 DXA BONE DENSITY AXIAL: CPT | Mod: 26,,, | Performed by: INTERNAL MEDICINE

## 2024-05-28 ENCOUNTER — TELEPHONE (OUTPATIENT)
Dept: INTERNAL MEDICINE | Facility: CLINIC | Age: 69
End: 2024-05-28

## 2024-05-28 ENCOUNTER — OFFICE VISIT (OUTPATIENT)
Dept: INTERNAL MEDICINE | Facility: CLINIC | Age: 69
End: 2024-05-28
Payer: MEDICARE

## 2024-05-28 VITALS
SYSTOLIC BLOOD PRESSURE: 110 MMHG | DIASTOLIC BLOOD PRESSURE: 60 MMHG | HEIGHT: 63 IN | BODY MASS INDEX: 27.64 KG/M2 | WEIGHT: 156 LBS

## 2024-05-28 DIAGNOSIS — K80.20 GALLSTONES: Primary | ICD-10-CM

## 2024-05-28 DIAGNOSIS — K76.0 FATTY (CHANGE OF) LIVER, NOT ELSEWHERE CLASSIFIED: ICD-10-CM

## 2024-05-28 DIAGNOSIS — Z91.89 AT RISK FOR BLEEDING: ICD-10-CM

## 2024-05-28 DIAGNOSIS — R73.01 IFG (IMPAIRED FASTING GLUCOSE): ICD-10-CM

## 2024-05-28 DIAGNOSIS — N81.6 RECTOCELE: ICD-10-CM

## 2024-05-28 DIAGNOSIS — N94.9 ADNEXAL CYST: ICD-10-CM

## 2024-05-28 DIAGNOSIS — Z01.00 ROUTINE EYE EXAM: ICD-10-CM

## 2024-05-28 DIAGNOSIS — R53.83 FATIGUE, UNSPECIFIED TYPE: ICD-10-CM

## 2024-05-28 DIAGNOSIS — E03.9 ACQUIRED HYPOTHYROIDISM: ICD-10-CM

## 2024-05-28 DIAGNOSIS — D25.1 INTRAMURAL LEIOMYOMA OF UTERUS: ICD-10-CM

## 2024-05-28 DIAGNOSIS — K76.0 FATTY LIVER: ICD-10-CM

## 2024-05-28 DIAGNOSIS — Z01.810 PREOP CARDIOVASCULAR EXAM: Primary | ICD-10-CM

## 2024-05-28 DIAGNOSIS — K83.5 BILIARY CYST: ICD-10-CM

## 2024-05-28 PROCEDURE — G2211 COMPLEX E/M VISIT ADD ON: HCPCS | Mod: S$PBB,,, | Performed by: INTERNAL MEDICINE

## 2024-05-28 PROCEDURE — 99214 OFFICE O/P EST MOD 30 MIN: CPT | Mod: S$PBB,,, | Performed by: INTERNAL MEDICINE

## 2024-05-28 PROCEDURE — 99999 PR PBB SHADOW E&M-EST. PATIENT-LVL IV: CPT | Mod: PBBFAC,,, | Performed by: INTERNAL MEDICINE

## 2024-05-28 PROCEDURE — 99214 OFFICE O/P EST MOD 30 MIN: CPT | Mod: PBBFAC | Performed by: INTERNAL MEDICINE

## 2024-05-28 NOTE — PROGRESS NOTES
Patient ID: Isha Leonard is a 69 y.o. female.    Chief Complaint: Follow-up      Assessment:       1. Gallstones: see ultrasound 2014; CT 2017, also 2019; sludge seen 3/22    2. Intramural leiomyoma of uterus: see u/s and MRI from [facility] 2023    3. Adnexal cyst: see u/s and MRI from [facility] 2023    4. Acquired hypothyroidism    5. Fatty liver: see u/s 2014; stable CT 2017: fibroscan 2019 F0/4 fibrosis and S3 (>2/3 steatosis).    6. Rectocele    7. Routine eye exam          Plan:         1. Gallstones: see ultrasound 2014; CT 2017, also 2019; sludge seen 3/22  -     US Abdomen Complete; Future; Expected date: 05/28/2024    2. Intramural leiomyoma of uterus: see u/s and MRI from [facility] 2023    3. Adnexal cyst: see u/s and MRI from [facility] 2023    4. Acquired hypothyroidism    5. Fatty liver: see u/s 2014; stable CT 2017: fibroscan 2019 F0/4 fibrosis and S3 (>2/3 steatosis).    6. Rectocele    7. Routine eye exam  -     Ambulatory referral/consult to Optometry; Future; Expected date: 06/04/2024     I will review all studies and determine further tx depending on findings  Schedule mammogram  COVID booster recommend  Return for preop in the next 4 months  Visit today manifests increased complexity associated with the care of the multiple chronic and episodic problems I addressed.  I am managing a longitudinal care plan of the patient due to the serious and complex problems listed above.    Subjective:   Follow up multiple issues.   is with her.    Planning rectocoele surgery in [place] in October.  Will need preop prior to surgery.    Due for mammogram now.    Gets her care in a few different places- Endocrinology in [place]; labs done in September elevated TPO however normal TSH, discussed.    Would like a routine eye exam.    COVID booster discussed, she might consider getting that done.    Patient Active Problem List:     Other hyperlipidemia     Acquired hypothyroidism     AR (allergic rhinitis)     Glucose intolerance  (impaired glucose tolerance)     Intramural leiomyoma of uterus: see u/s and MRI from [facility] 2023     Gallstones: see ultrasound 2014; CT 2017, also 2019; sludge seen 3/22     Colon adenoma: 2011- also 3/17 and 11/22 5 year follow up recommended     Fatty liver: see u/s 2014; stable CT 2017: fibroscan 2019 F0/4 fibrosis and S3 (>2/3 steatosis).     Midline cystocele     Rectus diastasis     Diverticulosis of large intestine without hemorrhage: see CT scan May 2017     Bilateral renal cysts: see CT scan May 2017; not seen on u/s 2019     Osteopenia of multiple sites: see DEXA 2/18, also 4/22, stable 5/24     Mild vitamin D deficiency     Helicobacter pylori ab+ in 2010, treated; negative stool 2/20     Gastroesophageal reflux disease: mild gastritits and reactive gastropathy on EGD 11/22 no H pylori     Adnexal cyst: see u/s and MRI from [facility] 2023     Essential hypertension     Rectocele               Review of Systems   Constitutional:  Negative for fatigue.   HENT:  Negative for hearing loss.    Eyes:  Negative for visual disturbance.   Respiratory:  Negative for shortness of breath.    Cardiovascular:  Negative for chest pain.   Gastrointestinal:  Negative for abdominal pain, constipation and diarrhea.        Denies GERD.  Is caries to see about her fatty liver and gallstones   Genitourinary:  Negative for dysuria, frequency and vaginal bleeding.        Pelvic floor issues, having rectocele surgery  Has an upcoming appointment to evaluate fibroid and ovarian cyst   Musculoskeletal:  Negative for joint swelling.   Skin:  Negative for rash.   Neurological:  Negative for weakness, light-headedness and headaches.   Psychiatric/Behavioral:  Negative for sleep disturbance.          Objective:      Physical Exam  Vitals and nursing note reviewed.   Constitutional:       Appearance: She is well-developed.   HENT:      Head: Normocephalic and atraumatic.      Right Ear: External ear normal.      Left Ear: External ear  normal.      Nose: Nose normal.      Mouth/Throat:      Pharynx: No oropharyngeal exudate.   Eyes:      General: No scleral icterus.     Extraocular Movements: Extraocular movements intact.      Conjunctiva/sclera: Conjunctivae normal.   Neck:      Thyroid: No thyromegaly.      Vascular: No JVD.      Comments: Well healed thyroidectomy scar  Cardiovascular:      Rate and Rhythm: Normal rate and regular rhythm.      Heart sounds: Normal heart sounds. No murmur heard.     No gallop.   Pulmonary:      Effort: Pulmonary effort is normal. No respiratory distress.      Breath sounds: Normal breath sounds. No wheezing.   Abdominal:      General: Bowel sounds are normal. There is no distension.      Palpations: Abdomen is soft. There is no mass.      Tenderness: There is no abdominal tenderness. There is no guarding or rebound.   Musculoskeletal:         General: No tenderness. Normal range of motion.      Cervical back: Normal range of motion and neck supple.   Lymphadenopathy:      Cervical: No cervical adenopathy.   Skin:     General: Skin is warm.      Findings: No erythema or rash.   Neurological:      General: No focal deficit present.      Mental Status: She is alert and oriented to person, place, and time.      Cranial Nerves: No cranial nerve deficit.      Coordination: Coordination normal.   Psychiatric:         Behavior: Behavior normal.         Thought Content: Thought content normal.         Judgment: Judgment normal.             Health Maintenance Due   Topic Date Due    COVID-19 Vaccine (7 - 2023-24 season) 12/13/2023    Mammogram  03/08/2024

## 2024-05-29 ENCOUNTER — HOSPITAL ENCOUNTER (OUTPATIENT)
Dept: RADIOLOGY | Facility: OTHER | Age: 69
Discharge: HOME | End: 2024-05-29
Attending: INTERNAL MEDICINE
Payer: MEDICARE

## 2024-05-29 ENCOUNTER — HOSPITAL ENCOUNTER (OUTPATIENT)
Dept: RADIOLOGY | Facility: OTHER | Age: 69
Discharge: HOME | End: 2024-05-29
Attending: STUDENT IN AN ORGANIZED HEALTH CARE EDUCATION/TRAINING PROGRAM
Payer: MEDICARE

## 2024-05-29 DIAGNOSIS — K80.20 GALLSTONES: ICD-10-CM

## 2024-05-29 DIAGNOSIS — N94.9 ADNEXAL CYST: ICD-10-CM

## 2024-05-29 PROCEDURE — 76856 US EXAM PELVIC COMPLETE: CPT | Mod: 26,,, | Performed by: RADIOLOGY

## 2024-05-29 PROCEDURE — 76700 US EXAM ABDOM COMPLETE: CPT | Mod: TC

## 2024-05-29 PROCEDURE — 76856 US EXAM PELVIC COMPLETE: CPT | Mod: TC

## 2024-05-29 PROCEDURE — 76830 TRANSVAGINAL US NON-OB: CPT | Mod: 26,,, | Performed by: RADIOLOGY

## 2024-05-29 PROCEDURE — 76700 US EXAM ABDOM COMPLETE: CPT | Mod: 26,,, | Performed by: RADIOLOGY

## 2024-05-29 PROCEDURE — 76830 TRANSVAGINAL US NON-OB: CPT | Mod: TC

## 2024-06-10 ENCOUNTER — PATIENT MESSAGE (OUTPATIENT)
Dept: INTERNAL MEDICINE | Facility: CLINIC | Age: 69
End: 2024-06-10
Payer: MEDICARE

## 2024-07-30 ENCOUNTER — TELEPHONE (OUTPATIENT)
Dept: INTERNAL MEDICINE | Facility: CLINIC | Age: 69
End: 2024-07-30
Payer: MEDICARE

## 2024-07-30 NOTE — TELEPHONE ENCOUNTER
Spoke to pt and notified that a pre op cannot be done virtually because she is in another [facility] and not in [place] , pt stated that she wanted everything transfer to [place] . I notified pt that she have her pre op testing schedule for here in September I cannot transfer it out of state. Pt voiced understanding and hung up the phone

## 2024-07-30 NOTE — TELEPHONE ENCOUNTER
----- Message from Pema Virk sent at 7/30/2024  3:25 PM CDT -----  Contact: [phone]  Pt is requesting this through the portal please advise    With Provider: Gwen Duarte [[facility]]    Preferred Date Range: 7/30/2024 - 8/7/2024    Preferred Times: Any Time    Reason for visit: Preop clearance    Comments:  Surgery scheduled for August 13 in [place] with Dr. Nair - she is requesting a preoperative clearance ASAP. Thank you!

## 2024-10-11 ENCOUNTER — TELEPHONE (OUTPATIENT)
Dept: INTERNAL MEDICINE | Facility: CLINIC | Age: 69
End: 2024-10-11
Payer: MEDICARE

## 2024-10-11 NOTE — TELEPHONE ENCOUNTER
----- Message from Valerie sent at 10/11/2024  9:09 AM CDT -----  Contact: [phone] Mr. Connolly  Type: Returning a call    Who left a message?Miladys Aguayo MA    When did the practice call? This morning 8:00 am     Does patient know what this is regarding: f/u Appt.     Would the patient rather a call back or a response via [facility]? Call back     Comments:

## 2024-10-11 NOTE — TELEPHONE ENCOUNTER
----- Message from Juan sent at 10/10/2024  2:39 PM CDT -----  Contact: [phone]  Caller is requesting an earlier appointment then we can schedule.  Caller is requesting a message be sent to the provider.    When is the next available appointment with their provider:  November    Reason for the appointment:  breast cancer f/u    Patient preference of timeframe to be scheduled:  10/10/24-10/15/24    Would the patient like a call back, or a response through their [facility] portal?:   call    Comments:  Patient is back in town from surgery and will leave to [place] for radiation, want to be seen or a call before they leave

## 2024-10-16 ENCOUNTER — OFFICE VISIT (OUTPATIENT)
Dept: INTERNAL MEDICINE | Facility: CLINIC | Age: 69
End: 2024-10-16
Payer: MEDICARE

## 2024-10-16 VITALS — DIASTOLIC BLOOD PRESSURE: 60 MMHG | SYSTOLIC BLOOD PRESSURE: 120 MMHG

## 2024-10-16 DIAGNOSIS — M85.89 OSTEOPENIA OF MULTIPLE SITES: ICD-10-CM

## 2024-10-16 DIAGNOSIS — E78.49 OTHER HYPERLIPIDEMIA: ICD-10-CM

## 2024-10-16 DIAGNOSIS — E89.0 S/P PARTIAL THYROIDECTOMY: ICD-10-CM

## 2024-10-16 DIAGNOSIS — C50.312 MALIGNANT NEOPLASM OF LOWER-INNER QUADRANT OF LEFT BREAST IN FEMALE, ESTROGEN RECEPTOR POSITIVE: Primary | ICD-10-CM

## 2024-10-16 DIAGNOSIS — E03.9 ACQUIRED HYPOTHYROIDISM: ICD-10-CM

## 2024-10-16 DIAGNOSIS — B18.1 HEPATITIS B CARRIER: ICD-10-CM

## 2024-10-16 DIAGNOSIS — I10 ESSENTIAL HYPERTENSION: ICD-10-CM

## 2024-10-16 DIAGNOSIS — I70.0 AORTIC ATHEROSCLEROSIS: ICD-10-CM

## 2024-10-16 DIAGNOSIS — Z17.0 MALIGNANT NEOPLASM OF LOWER-INNER QUADRANT OF LEFT BREAST IN FEMALE, ESTROGEN RECEPTOR POSITIVE: Primary | ICD-10-CM

## 2024-10-16 DIAGNOSIS — R73.02 GLUCOSE INTOLERANCE (IMPAIRED GLUCOSE TOLERANCE): ICD-10-CM

## 2024-10-16 DIAGNOSIS — K21.9 GASTROESOPHAGEAL REFLUX DISEASE, UNSPECIFIED WHETHER ESOPHAGITIS PRESENT: ICD-10-CM

## 2024-10-16 PROCEDURE — 99213 OFFICE O/P EST LOW 20 MIN: CPT | Mod: PBBFAC | Performed by: INTERNAL MEDICINE

## 2024-10-16 PROCEDURE — 99999 PR PBB SHADOW E&M-EST. PATIENT-LVL III: CPT | Mod: PBBFAC,,, | Performed by: INTERNAL MEDICINE

## 2024-10-16 RX ORDER — RALOXIFENE HYDROCHLORIDE 60 MG/1
60 TABLET, FILM COATED ORAL
COMMUNITY
Start: 2024-10-02 | End: 2024-10-16

## 2024-10-16 RX ORDER — OMEPRAZOLE 40 MG/1
40 CAPSULE, DELAYED RELEASE ORAL DAILY
Qty: 30 CAPSULE | Refills: 11 | Status: SHIPPED | OUTPATIENT
Start: 2024-10-16 | End: 2025-10-16

## 2024-10-16 NOTE — PROGRESS NOTES
Patient ID: Isha Leonard is a 69 y.o. female.    Chief Complaint: Follow-up      Assessment:       1. Malignant neoplasm of lower-inner quadrant of left breast in female, estrogen receptor positive    2. Hepatitis B carrier    3. Aortic atherosclerosis: seen on CT 3/22    4. Osteopenia of multiple sites: see DEXA 2/18, also 4/22, stable 5/24    5. Gastroesophageal reflux disease, unspecified whether esophagitis present    6. S/P partial thyroidectomy: 1980    7. Glucose intolerance (impaired glucose tolerance)    8. Acquired hypothyroidism    9. Other hyperlipidemia: CT score 0 2022    10. Essential hypertension          Plan:           1. Malignant neoplasm of lower-inner quadrant of left breast in female, estrogen receptor positive  Overview:  Oncotype 7.      2. Hepatitis B carrier    3. Aortic atherosclerosis: seen on CT 3/22    4. Osteopenia of multiple sites: see DEXA 2/18, also 4/22, stable 5/24    5. Gastroesophageal reflux disease, unspecified whether esophagitis present  -     omeprazole (PRILOSEC) 40 MG capsule; Take 1 capsule (40 mg total) by mouth once daily.  Dispense: 30 capsule; Refill: 11    6. S/P partial thyroidectomy: 1980    7. Glucose intolerance (impaired glucose tolerance)    8. Acquired hypothyroidism    9. Other hyperlipidemia: CT score 0 2022    10. Essential hypertension       GERD issues discussed, diet information provided  Start Prilosec  Lifestyle issues reviewed  She will consider COVID booster at a later point  She is getting her oncology treatment in [place] but may consider getting treatment here, evidently the choice is between anastrozole and Evista; outside records reviewed.  Cautions and side effects of each reviewed.  Anticipate she should see me within the next 6 months or sooner with problems in the interim    Visit today manifests increased complexity associated with the care of the multiple chronic and episodic problems I addressed.  I am managing a longitudinal  care plan of the patient due to the serious and complex problems listed above.    Patient evaluated for over 45 minutes with this appoinment, including diagnostic testing and treatment.  All questions answered,  chart reviewed and care coordinated.    Subjective:   History of Present Illness    CHIEF COMPLAINT:  Isha presents today for follow up on breast cancer diagnosis.    BREAST CANCER:  She discovered a breast lump through self-exam. Initial mammogram was negative, but subsequent ultrasound revealed concerning spots. She was diagnosed with stage one breast cancer, with a 1.9 cm tumor that is estrogen receptor-positive.    GASTROESOPHAGEAL REFLUX DISEASE (GERD):  She experiences acid reflux, particularly when sleeping. She denies associated chest pain, tightness, or shortness of breath. The reflux symptoms are not specifically related to eating or physical activity.    MEDICAL HISTORY:  She underwent thyroid surgery in 1980 and an endoscopy in October 2022. Her next colonoscopy is due in 2027.    LAB RESULTS:  Recent labs show slightly elevated cholesterol at 209 and A1C at 6.0, indicating prediabetes, which has been an ongoing concern. All other lab values were reported as normal.    BONE HEALTH:  She was diagnosed with osteopenia in May. She has not progressed to osteoporosis and is aware of potential treatment options, including IV therapy, if her condition worsens.    MEDICATIONS:  She reports discontinuation of Vagifem (estrogen) previously prescribed by her gynecologist.    EAR, NOSE, AND THROAT:  She experienced ear pain during airplane landing on a recent trip, which has since resolved. She reports possible eye irritation but denies itchy or watery eyes, sneezing, or other typical allergy symptoms. No significant energy changes noted in relation to potential allergies.      ROS:  General: -fever, -chills, -fatigue, -weight gain, -weight loss  Eyes: -vision changes, -redness, -discharge, +eye pain  ENT:  "-ear pain, -nasal congestion, -sore throat  Cardiovascular: -chest pain, -palpitations, -lower extremity edema  Respiratory: -cough, -shortness of breath  Gastrointestinal: -abdominal pain, -nausea, -vomiting, -diarrhea, -constipation, -blood in stool  Genitourinary: -dysuria, -hematuria, -frequency  Musculoskeletal: -joint pain, -muscle pain  Skin: -rash, -lesion  Neurological: -headache, -dizziness, -numbness, -tingling  Psychiatric: -anxiety, -depression, -sleep difficulty  Allergic: -allergic reactions         Patient Active Problem List   Diagnosis    Other hyperlipidemia: CT score 0 2022    Acquired hypothyroidism    AR (allergic rhinitis)    Glucose intolerance (impaired glucose tolerance)    Intramural leiomyoma of uterus: see u/s and MRI from [facility] 2023    Gallstones: see ultrasound 2014; CT 2017, also 2019; sludge seen 3/22; stable 5/24    Colon adenoma: 2011- also 3/17 and 11/22 5 year follow up recommended    Fatty liver: see u/s 2014; stable CT 2017: fibroscan 2019 F0/4 fibrosis and S3 (>2/3 steatosis).; stable 5/24    Midline cystocele    Rectus diastasis    Diverticulosis of large intestine without hemorrhage: see CT scan May 2017    Bilateral renal cysts: see CT scan May 2017; not seen on u/s 2019    Osteopenia of multiple sites: see DEXA 2/18, also 4/22, stable 5/24    Mild vitamin D deficiency    Helicobacter pylori ab+ in 2010, treated; negative stool 2/20    Gastroesophageal reflux disease: mild gastritits and reactive gastropathy on EGD 11/22 no H pylori    Adnexal cyst: see u/s and MRI from [facility] 2023    Essential hypertension    Rectocele    Hepatitis B carrier    Aortic atherosclerosis: see CT 3/22    Malignant neoplasm of lower-inner quadrant of left breast in female, estrogen receptor positive    S/P partial thyroidectomy: 1980      Outside records:  "The patient is a 69 y.o. female who presents today for initial consultation regarding her left breast invasive lobular carcinoma ER+ (>95%), " AK+ (>95%), Her2-, s/p lumpectomy (pT1c, pN0, referred by Dr. July Berumen, breast Surgery.   Paige's past medical history is remarkable for Graves disease (s/p partial thyroidectomy in 1980, on Synthroid), osteopenia, uterine fibroids, a right ovarian cyst, and a rectocele.  In 2023 she underwent surgery for uterine prolapse and cystocele repair.     On 8/19/2024 Paige underwent screening mammography which was abnormal.   8/21/2024 she was called back for additional views of both breasts and an ultrasound. The left breast had a 1.8 cm irregular mass in the 7:00 axis, suspicious for carcinoma. This abuts the pectoralis muscle. The right breast had a 8 mm oval mass at the 10:00 axis. Otherwise there was a stable lesion in the left 2:00 a.m. axis and a likely complex cyst in the right breast 2:00 axis. There was no suspicious adenopathy.  8/23/2024 bilateral ultrasound-guided core biopsies were performed.  The left breast showed a grade 2 invasive lobular carcinoma estrogen and progesterone receptors were greater than 95% positive. HER2 Vicky was negative for amplification with an IHC score of 1+.   The right breast showed focal atypical ductal hyperplasia, adjacent to a benign fibroadenoma.  9/6/2024 she underwent a left lumpectomy and sentinel node biopsy performed by Dr. July Berumen.  Pathology revealed an invasive lobular carcinoma, ER+ (>95%), AK+ (>95%), Her2-, 1.9 cm of grade 3 disease, excised with negative margins and 0/1 lymph nodes involved for a pT1cN0(sn).  9/26/2024 she consulted with Dr. Maribell Asher, Rad Onc.including no treatment, radiotherapy to the LEFT breast to 40.05 Gy in 15 fractions, followed by a boost to the excision cavity to an additional 10 Gy in 4 fractions. Given her lobular histology, and her 1.9 cm of high grade disease, and her desire to reduce local recurrence as much as possible, she was recommended to undergo 4 weeks of radiotherapy, however, Dr. Asher was waiting to  start until her Oncotype Dx returns (was not available at that time).   Paige presents today, accompanied by her daughter  Aidaelyssa Leonard, who was one of my colleagues previously.  Paige has been recovering well from surgery and is here to discuss adjuvant treatment now that she is s/p lumpectomy.  We discussed the natural history of her disease, and factors that increase risk, including the role of height and weight, as well as early prenatal environment,  environment, and childhood nutrition.  Also discussed how the risk of breast cancer is associated with earlier age of menarche, and higher maternal age at first pregnancy (or no pregnancy), due to several years of repetitive, uninterrupted menstrual cycles and hormones which trigger the mutation of breast cells.   We reviewed her pathology and tumor features.  Of note, her Oncotype DX score is 7, with a 3% risk of recurrence after 9 years, and there is no role for chemotherapy.   It indicates high sensitivity to endocrine therapy, and I initially recommended starting her on an aromastase inhibitor as per standard of care.  However, there were concerns because aromatase inhibitors can reduce bone density and she already has a history of osteoporosis which is followed by Dr. Chery, Endocrinology.  Instead of an aromatase inhibitor, she will start on raloxifene (Evista) 60 mg daily, which is used to reduce risk of breast cancer recurrence and the risk of developing another breast cancer, as well as increase bone density.  Reassured we will monitor her tolerance carefully.   Will review with endocrinology,Dr Chery regarding her osteoporosis risks  We also discussed how she may be a candidate for Reclast or Zometa and consider change to AI (arimidex) based on review  Explianed to daughter that standard of care in adjuvant setting is an IA  Of note, Paige will be travelling to [place] shortly (where she resides part time), and may wish to proceed with RT  "once she returns; will be in contact with Dr. Asher, Rad Onc.  Reviewed nutrition, diet, supplement, and lifestyle recommendations, and provided a copy of [employer].  Also provided handouts on soy, reassured safe to eat.   Recommend following an organic, plant-based diet with adequate protein and fiber, with limited intake of condense carbohydrates, sugar, and processed foods.  Encouraged to remain physically active."    Had lumpectomy then radiation then meds.      Review of Systems      Objective:      Physical Exam  Vitals and nursing note reviewed.   Constitutional:       Appearance: She is well-developed.   HENT:      Head: Normocephalic and atraumatic.      Right Ear: External ear normal.      Left Ear: External ear normal.      Nose: Nose normal.      Mouth/Throat:      Pharynx: No oropharyngeal exudate.   Eyes:      General: No scleral icterus.     Extraocular Movements: Extraocular movements intact.      Conjunctiva/sclera: Conjunctivae normal.   Neck:      Thyroid: No thyromegaly.      Vascular: No JVD.      Comments: Well healed thyroid scar  Cardiovascular:      Rate and Rhythm: Normal rate and regular rhythm.      Heart sounds: Normal heart sounds. No murmur heard.     No gallop.   Pulmonary:      Effort: Pulmonary effort is normal. No respiratory distress.      Breath sounds: Normal breath sounds. No wheezing.   Abdominal:      General: Bowel sounds are normal. There is no distension.      Palpations: Abdomen is soft. There is no mass.      Tenderness: There is no abdominal tenderness. There is no guarding or rebound.   Genitourinary:     Comments: Well healed left breast scar, no mass  Musculoskeletal:         General: No tenderness. Normal range of motion.      Cervical back: Normal range of motion and neck supple.   Lymphadenopathy:      Cervical: No cervical adenopathy.   Skin:     General: Skin is warm.      Findings: No erythema or rash.   Neurological:      General: No focal deficit " present.      Mental Status: She is alert and oriented to person, place, and time.      Cranial Nerves: No cranial nerve deficit.      Coordination: Coordination normal.   Psychiatric:         Behavior: Behavior normal.         Thought Content: Thought content normal.         Judgment: Judgment normal.             Health Maintenance Due   Topic Date Due    COVID-19 Vaccine (7 - 2024-25 season) 09/01/2024

## 2025-02-26 ENCOUNTER — TELEPHONE (OUTPATIENT)
Dept: OPTOMETRY | Facility: CLINIC | Age: 70
End: 2025-02-26
Payer: MEDICARE

## 2025-02-26 NOTE — TELEPHONE ENCOUNTER
Spoke to daughter and r/s appt       ----- Message from Staci sent at 2/26/2025  8:48 AM CST -----  Contact: pt @[phone]  Isha Leonard calling regarding Appointment Access  (message) for #pt is calling to reschedule appt 2/28 for her and  until April if no answer send message to portal,  asking for call back

## 2025-04-25 ENCOUNTER — OFFICE VISIT (OUTPATIENT)
Dept: OPTOMETRY | Facility: CLINIC | Age: 70
End: 2025-04-25
Payer: MEDICARE

## 2025-04-25 DIAGNOSIS — H52.03 HYPEROPIA, BILATERAL: ICD-10-CM

## 2025-04-25 DIAGNOSIS — Z83.511 FAMILY HISTORY OF GLAUCOMA IN MOTHER: ICD-10-CM

## 2025-04-25 DIAGNOSIS — H25.13 NS (NUCLEAR SCLEROSIS), BILATERAL: Primary | ICD-10-CM

## 2025-04-25 DIAGNOSIS — H52.4 PRESBYOPIA: ICD-10-CM

## 2025-04-25 PROCEDURE — 99212 OFFICE O/P EST SF 10 MIN: CPT | Mod: PBBFAC,PO | Performed by: OPTOMETRIST

## 2025-04-25 PROCEDURE — 99999 PR PBB SHADOW E&M-EST. PATIENT-LVL II: CPT | Mod: PBBFAC,,, | Performed by: OPTOMETRIST

## 2025-04-25 NOTE — PROGRESS NOTES
HPI    ANIBAL: 1 yrs    CC: Pt is here today for a routine eye exam. Pt states that her vision has   been stable since her last exam.      (-)Dryness  (-)Burning  (-)Itchiness  (-)Tearing  (-)Flashes  (-)Floaters   (-)Photophobia  (-)Eye Pain      Diabetic: no    Contact Lens: no    Eye Meds: no    Ocular History: none    Family History:  Glaucoma - Mother     PD: 60.0    Last edited by Kathy Valentin MA on 4/25/2025  8:18 AM.            Assessment /Plan     For exam results, see Encounter Report.    NS (nuclear sclerosis), bilateral   Monitor     Family history of glaucoma in mother   IOP WNL   Optic nerve WNL  Good overall ocular health, monitor yearly    Hyperopia, bilateral  Presbyopia   Rx specs    RTC 1 year